# Patient Record
Sex: FEMALE | Race: WHITE | NOT HISPANIC OR LATINO | Employment: PART TIME | URBAN - METROPOLITAN AREA
[De-identification: names, ages, dates, MRNs, and addresses within clinical notes are randomized per-mention and may not be internally consistent; named-entity substitution may affect disease eponyms.]

---

## 2017-04-10 ENCOUNTER — APPOINTMENT (OUTPATIENT)
Dept: LAB | Facility: HOSPITAL | Age: 37
End: 2017-04-10

## 2017-04-10 ENCOUNTER — OFFICE VISIT (OUTPATIENT)
Dept: ONCOLOGY | Facility: CLINIC | Age: 37
End: 2017-04-10

## 2017-04-10 ENCOUNTER — LAB (OUTPATIENT)
Dept: OTHER | Facility: HOSPITAL | Age: 37
End: 2017-04-10

## 2017-04-10 VITALS
HEART RATE: 82 BPM | RESPIRATION RATE: 16 BRPM | BODY MASS INDEX: 22.62 KG/M2 | OXYGEN SATURATION: 100 % | TEMPERATURE: 98.2 F | DIASTOLIC BLOOD PRESSURE: 64 MMHG | HEIGHT: 59 IN | WEIGHT: 112.2 LBS | SYSTOLIC BLOOD PRESSURE: 102 MMHG

## 2017-04-10 DIAGNOSIS — Z15.01 BRCA1 GENETIC CARRIER: ICD-10-CM

## 2017-04-10 DIAGNOSIS — Z15.09 BRCA1 GENETIC CARRIER: ICD-10-CM

## 2017-04-10 DIAGNOSIS — C50.111 BILATERAL MALIGNANT NEOPLASM OF CENTRAL PORTION OF BREAST IN FEMALE (HCC): ICD-10-CM

## 2017-04-10 DIAGNOSIS — C50.112 BILATERAL MALIGNANT NEOPLASM OF CENTRAL PORTION OF BREAST IN FEMALE (HCC): Primary | ICD-10-CM

## 2017-04-10 DIAGNOSIS — Z79.811 LONG TERM CURRENT USE OF AROMATASE INHIBITOR: ICD-10-CM

## 2017-04-10 DIAGNOSIS — D64.9 ANEMIA, UNSPECIFIED TYPE: Primary | ICD-10-CM

## 2017-04-10 DIAGNOSIS — C50.111 BILATERAL MALIGNANT NEOPLASM OF CENTRAL PORTION OF BREAST IN FEMALE (HCC): Primary | ICD-10-CM

## 2017-04-10 DIAGNOSIS — C50.112 BILATERAL MALIGNANT NEOPLASM OF CENTRAL PORTION OF BREAST IN FEMALE (HCC): ICD-10-CM

## 2017-04-10 LAB
ALBUMIN SERPL-MCNC: 4.2 G/DL (ref 3.5–5.2)
ALBUMIN/GLOB SERPL: 1.6 G/DL
ALP SERPL-CCNC: 93 U/L (ref 39–117)
ALT SERPL W P-5'-P-CCNC: 30 U/L (ref 1–33)
ANION GAP SERPL CALCULATED.3IONS-SCNC: 11.5 MMOL/L
AST SERPL-CCNC: 39 U/L (ref 1–32)
BASOPHILS # BLD AUTO: 0.03 10*3/MM3 (ref 0–0.2)
BASOPHILS NFR BLD AUTO: 0.6 % (ref 0–1.5)
BILIRUB SERPL-MCNC: 0.3 MG/DL (ref 0.1–1.2)
BUN BLD-MCNC: 12 MG/DL (ref 6–20)
BUN/CREAT SERPL: 23.5 (ref 7–25)
CALCIUM SPEC-SCNC: 9 MG/DL (ref 8.6–10.5)
CHLORIDE SERPL-SCNC: 102 MMOL/L (ref 98–107)
CO2 SERPL-SCNC: 29.5 MMOL/L (ref 22–29)
CREAT BLD-MCNC: 0.51 MG/DL (ref 0.57–1)
DEPRECATED RDW RBC AUTO: 42.7 FL (ref 37–54)
EOSINOPHIL # BLD AUTO: 0.03 10*3/MM3 (ref 0–0.7)
EOSINOPHIL NFR BLD AUTO: 0.6 % (ref 0.3–6.2)
ERYTHROCYTE [DISTWIDTH] IN BLOOD BY AUTOMATED COUNT: 13 % (ref 11.7–13)
FERRITIN SERPL-MCNC: 21.1 NG/ML (ref 13–150)
GFR SERPL CREATININE-BSD FRML MDRD: 136 ML/MIN/1.73
GLOBULIN UR ELPH-MCNC: 2.6 GM/DL
GLUCOSE BLD-MCNC: 98 MG/DL (ref 65–99)
HCT VFR BLD AUTO: 41.2 % (ref 35.6–45.5)
HGB BLD-MCNC: 14.3 G/DL (ref 11.9–15.5)
HOLD SPECIMEN: NORMAL
IMM GRANULOCYTES # BLD: 0.01 10*3/MM3 (ref 0–0.03)
IMM GRANULOCYTES NFR BLD: 0.2 % (ref 0–0.5)
LYMPHOCYTES # BLD AUTO: 0.88 10*3/MM3 (ref 0.9–4.8)
LYMPHOCYTES NFR BLD AUTO: 17.3 % (ref 19.6–45.3)
MCH RBC QN AUTO: 31.7 PG (ref 26.9–32)
MCHC RBC AUTO-ENTMCNC: 34.7 G/DL (ref 32.4–36.3)
MCV RBC AUTO: 91.4 FL (ref 80.5–98.2)
MONOCYTES # BLD AUTO: 0.4 10*3/MM3 (ref 0.2–1.2)
MONOCYTES NFR BLD AUTO: 7.9 % (ref 5–12)
NEUTROPHILS # BLD AUTO: 3.74 10*3/MM3 (ref 1.9–8.1)
NEUTROPHILS NFR BLD AUTO: 73.4 % (ref 42.7–76)
NRBC BLD MANUAL-RTO: 0 /100 WBC (ref 0–0)
PLATELET # BLD AUTO: 267 10*3/MM3 (ref 140–500)
PMV BLD AUTO: 9 FL (ref 6–12)
POTASSIUM BLD-SCNC: 3.8 MMOL/L (ref 3.5–5.2)
PROT SERPL-MCNC: 6.8 G/DL (ref 6–8.5)
RBC # BLD AUTO: 4.51 10*6/MM3 (ref 3.9–5.2)
SODIUM BLD-SCNC: 143 MMOL/L (ref 136–145)
WBC NRBC COR # BLD: 5.09 10*3/MM3 (ref 4.5–10.7)

## 2017-04-10 PROCEDURE — 80053 COMPREHEN METABOLIC PANEL: CPT | Performed by: INTERNAL MEDICINE

## 2017-04-10 PROCEDURE — 99214 OFFICE O/P EST MOD 30 MIN: CPT | Performed by: INTERNAL MEDICINE

## 2017-04-10 PROCEDURE — 36415 COLL VENOUS BLD VENIPUNCTURE: CPT

## 2017-04-10 PROCEDURE — 82728 ASSAY OF FERRITIN: CPT | Performed by: INTERNAL MEDICINE

## 2017-04-10 PROCEDURE — 85025 COMPLETE CBC W/AUTO DIFF WBC: CPT | Performed by: INTERNAL MEDICINE

## 2017-04-10 NOTE — PROGRESS NOTES
Subjective      EASONS FOR FOLLOWUP:   1. L9T6uH3 triple negative right breast cancer.   2. T1Nx left breast cancer invasive ductal carcinoma, ER/SD positive, HER2 negative.   3. BRCA 1 positive.   4. Right internal mammary node positive on CT scan.   5. Neoadjuvant dose dense Adriamycin/Cytoxan, weekly Taxol started on 01/20/2014 with plan for axillary dissection on the right and sentinel node on left, bilateral mastectomies plus radiation to the right breast and internal mammary nodes.   6. Addition of carboplatin with weekly Taxol to start on 03/24/2014.   7. Carboplatin held on 05/05/2014 due to thrombocytopenia.   8. The patient was seen on 08/05/2014, postoperatively; bilateral mastectomies and sentinel nodes with a 5 mm residua l grade 3 tumor in the right breast with two negative sentinel nodes (cannot tell if the node with a clip was removed). Complete response in the left breast with two negative sentinel nodes. The decision was made to consider radiation because of her pret reatment internal mammary node on the right and tamoxifen therapy for the hormone positive breast cancer on right. Clinical trials are being looked into.   9. Additional surgery to remove the lymph node with the clip in the right axilla, 3 nodes removed and negative for metastatic disease. Tamoxifen started September 2014.   10. Radiation completed in October 2014. Tamoxifen on hold because of some neurological signs.   11. Not eligible for NSABP B-55 because of tamoxifen as of 10/20/2014.   12. Tamoxifen resumed on 11/18/2014 on every other day dose.   13. CT scans done for right neck discomfort with negative scans except for radiation scarring in the right lung apex.   14. Post total abdominal hysterectomy and BSO in 01/2015. Tamoxifen resumed with switch to an AI in 7/15  15. Lymphedema, right arm.                   16.  Iron deficiency anemia    History of Present Illness  patient is a 36-year-old white female with BRCA1  positivity and bilateral breast cancers currently on Arimidex since 2015.  Overall she is tolerating Arimidex well with very few hot flashes because of the Effexor but she is clearly not herself due to the postmenopausal state and has less energy low libido and I think some amount of depression which is not unusual in this situation.  Her mother had osteoporosis and her baseline bone density is osteopenia which is fairly severe and I added Boniva monthly in addition to calcium and vitamin D but she    .Her neck pain is resolved she is sleeping okay appetite is fair and apart from fatigue which is related to her postmenopausal state she appears to be doing okay.  She is a little more irritable than usual but realizes this is part of the postmenopausal state  Complains of some irritable bowel type symptoms with constipation alternating with diarrhea and she is a little more anemic than usual and her ferritin was low and she was given oral iron with good improvement in her hemoglobin.  I'm reluctant to add another test with colonoscopy for at this point and we will wait and see how she does off iron and if her hemoglobin drops and her iron is low again the colonoscopy and EGD will be scheduled .  Her lymphedema is a little better this summer    She continues to have discomfort over the right chest wall where she received radiation and does not feel that the implant is his flexible and stiffer and because of the radiation fibrosis on CAT scan has been concerned about issues here.    Her liver function tests have been barely up on and off and therefore we will do another ultrasound to evaluate the liver and gallbladder.    PAST MEDICAL HISTORY: Unremarkable. She has no chronic illnesses, on no chronic medication. She had no surgeries except for two C-sections, one in  and one in .     OB/GYN HISTORY: She is  2, para 2. Menarche at age 10. First childbirth wa s at age 26. She breast-fed her second  child for 6 weeks. She was on birth control until her first childbirth and then used a Mirena IUD which she just had removed 3 months ago.     HEMATOLOGIC/ONCOLOGIC HISTORY:   The patient was seen on 08/05/2014, having had bilateral mastectomies. Right breast had a 5 mm residual invasive tumor, grade 3, with clear margins, two negative sentinel nodes (I cannot tell if the lymph node with the clip was remove d and we will have to assess this). Left breast shows a complete pathological response with two negative sentinel nodes. The decision was made to review her for radiation therapy because of enlarged right internal mammary node, pretreatment, and to star t tamoxifen. She is going to have her ovaries removed later in the year when she has expanders put in and we can switch to an AI at that point. I am looking into clinical trials for BCRA positive patients with residual disease and for triple negative canc ers with residual disease but the 5 mm tumor may disqualify her.        The patient was seen on 09/02/2014 with additional surgery to remove the lymph node in her axilla with the clip in it which was thankfully benign in addition to 3 other nodes which were nega tive. Radiation to the internal mammary chain planned plus tamoxifen for 5 years. The NSABP B55 study is expected to have an amendment including hormone positive patients and we will revisit this issue after her radiation. Her port has been removed.        NSA BP amendment has not yet gone through to include patients on tamoxifen, therefore, she is not eligible for NSABP B-55 as of 10/21/2014. Tamoxifen held for 1 month because of some dizzy spells and hysterectomy and oophorectomy planned for early 2015, at wh ich point we will switch to Arimidex.        The patient was seen on 02/25/2014 having CT scans because a preop chest x-ray before hysterectomy showed a shadow in the right apex. CT scans showed what seemed to be radiation change in the apex of the  right lung. No abnormality in the neck or abdomen and a residual small seroma in the right axilla. Path reports on her hysterectomy and BSO were benign and because of her intolerance of tamoxifen, we started her on Effexor 12.5 b.i.d. to be increased as tolerated and t hen to resume tamoxifen every other day for a month and then go to daily if possible. Once she is stable at this, we will try and switch over to an AI if she can tolerate this.        The patient was seen on 04/21/2015 with a CT scan done to followup on changes noted on a previous CT scans, which were felt to be radiation-related. Thankfully, the scan shows diminished consolidation of the pleural surface and a small subpleural air space disease, stable at 8 x 4 mm and images through the upper abdomen and the re s t of the lung were negative. She is up to 37.5 of Effexor with good control of her hot flashes and taking her tamoxifen every other day and we have asked her to increase her tamoxifen to daily and we will see her back in 3 months and if she is tolerating this well, consider switching to an AI at that time.        Patient seen on 07/21/2015, tolerating tamoxifen well at full dose for the last 3 months since her hysterectomy. Plans to switch to Femara in 2-3 months. Nocturnal cough, which I suspect is allergic or reflux related. Consider a steroid inhaler.            SOCIAL HISTORY: She is , lives with her . She is school psychologist. She does not smoke. She drinks very minimally. No trouble with drug addiction.    FAMILY HISTORY: Parents are in their late 50s and are healthy. She has no siblings. She has a paternal aunt with breast cancer in 40s, maternal grandmother with breast cancer at age 58,   and both of the grandmother' s sisters had breast cancer in their 50s. She has a maternal uncle with prostate cancer at age 54. There is no ovarian, uterine or pancreatic cancer in the family that she is aware of. BRCA 1  "positive    Review of Systems   Constitutional: Positive for fatigue.   Respiratory: Positive for chest tightness.    Endocrine: Positive for heat intolerance.   Psychiatric/Behavioral: Positive for decreased concentration.   All other systems reviewed and are negative.     A comprehensive 14 point review of systems was performed and was negative except as mentioned.    Medications:  The current medication list was reviewed in the EMR    ALLERGIES:    Allergies   Allergen Reactions   • No Known Drug Allergy        Objective      Vitals:    04/10/17 1336   BP: 102/64   Pulse: 82   Resp: 16   Temp: 98.2 °F (36.8 °C)   TempSrc: Oral   SpO2: 100%   Weight: 112 lb 3.2 oz (50.9 kg)   Height: 59.45\" (151 cm)  Comment: new ht without shoes   PainSc: 0-No pain     Current Status 4/10/2017   ECOG score 0       Physical Exam    GENERAL:  Well-developed, well-nourished in no acute distress.   SKIN:  Warm, dry without rashes, purpura or petechiae.  HEAD:  Normocephalic.  EYES:  Pupils equal, round and reactive to light.  EOMs intact.  Conjunctivae normal.  EARS:  Hearing intact.  NOSE:  Septum midline.  No excoriations or nasal discharge.  MOUTH:  Tongue is well-papillated; no stomatitis or ulcers.  Lips normal.  THROAT:  Oropharynx without lesions or exudates.  NECK:  Supple with good range of motion; no thyromegaly or masses, no JVD.  LYMPHATICS:  No cervical, supraclavicular, axillary or inguinal adenopathy.  CHEST:  Lungs clear to percussion and auscultation. Good airflow.  BREASTS: Bilateral implants benign there is no axillary adenopathy  CARDIAC:  Regular rate and rhythm without murmurs, rubs or gallops. Normal S1,S2.  ABDOMEN:  Soft, nontender with no organomegaly or masses.  EXTREMITIES:  No clubbing, cyanosis or edema.  NEUROLOGICAL:  Cranial Nerves II-XII grossly intact.  No focal neurological deficits.  PSYCHIATRIC:  Normal affect and mood.        RECENT LABS:  Hematology WBC   Date Value Ref Range Status "   04/10/2017 5.09 4.50 - 10.70 10*3/mm3 Final     RBC   Date Value Ref Range Status   04/10/2017 4.51 3.90 - 5.20 10*6/mm3 Final     Hemoglobin   Date Value Ref Range Status   04/10/2017 14.3 11.9 - 15.5 g/dL Final     Hematocrit   Date Value Ref Range Status   04/10/2017 41.2 35.6 - 45.5 % Final     Platelets   Date Value Ref Range Status   04/10/2017 267 140 - 500 10*3/mm3 Final        Ferritin 21  Assessment/Plan   1.[id number] triple negative right breast cancer. [id number] dose dense Adriamycin Cytoxan and weekly carbotaxol-on tamoxifen-switch to Arimidex after oophorectomy in July 2015  2.T1Nx left breast cancer invasive ductal carcinoma, ER/NM positive, HER2 negative. [id number]                  3. BRCA 1 positive. Post hysterectomy and oophorectomy                   4.  Lymphedema right arm improved with a sleeve                  5.  Mild depression declines any treatment for this now                  6.  Mild anemia due to iron deficiency responding to oral iron                  7.  Osteopenia moderately severe-declined Boniva  was started now                  8.  Mildly elevated LFTs  Plan  Continue Arimidex and Effexor. no increase in Effexor dose at this time as she is managing fairly well with her depression.   Boniva monthly orally and calcium and vitamin D for her fairly significant osteopenia  Ultrasound liver and gallbladder because a minimally elevated LFTs  Decrease ferrous sulfate to 1 daily  Return in 4 months for follow-up with a ferritin level and if she is still in the 20s EGD and colonoscopy will be scheduled                        4/10/2017      CC:

## 2017-04-13 ENCOUNTER — HOSPITAL ENCOUNTER (OUTPATIENT)
Dept: ULTRASOUND IMAGING | Facility: HOSPITAL | Age: 37
Discharge: HOME | End: 2017-04-13
Attending: INTERNAL MEDICINE | Admitting: INTERNAL MEDICINE

## 2017-04-13 DIAGNOSIS — C50.112 BILATERAL MALIGNANT NEOPLASM OF CENTRAL PORTION OF BREAST IN FEMALE (HCC): ICD-10-CM

## 2017-04-13 DIAGNOSIS — Z79.811 LONG TERM CURRENT USE OF AROMATASE INHIBITOR: ICD-10-CM

## 2017-04-13 DIAGNOSIS — Z15.01 BRCA1 GENETIC CARRIER: ICD-10-CM

## 2017-04-13 DIAGNOSIS — C50.111 BILATERAL MALIGNANT NEOPLASM OF CENTRAL PORTION OF BREAST IN FEMALE (HCC): ICD-10-CM

## 2017-04-13 DIAGNOSIS — Z15.09 BRCA1 GENETIC CARRIER: ICD-10-CM

## 2017-04-13 PROCEDURE — 76705 ECHO EXAM OF ABDOMEN: CPT

## 2017-05-11 RX ORDER — ANASTROZOLE 1 MG/1
TABLET ORAL
Qty: 30 TABLET | Refills: 2 | Status: SHIPPED | OUTPATIENT
Start: 2017-05-11 | End: 2017-09-24 | Stop reason: SDUPTHER

## 2017-05-17 RX ORDER — VENLAFAXINE HYDROCHLORIDE 37.5 MG/1
CAPSULE, EXTENDED RELEASE ORAL
Qty: 30 CAPSULE | Refills: 4 | Status: SHIPPED | OUTPATIENT
Start: 2017-05-17 | End: 2017-10-15 | Stop reason: SDUPTHER

## 2017-07-24 ENCOUNTER — OFFICE VISIT (OUTPATIENT)
Dept: ONCOLOGY | Facility: CLINIC | Age: 37
End: 2017-07-24

## 2017-07-24 ENCOUNTER — LAB (OUTPATIENT)
Dept: OTHER | Facility: HOSPITAL | Age: 37
End: 2017-07-24

## 2017-07-24 VITALS
HEART RATE: 88 BPM | TEMPERATURE: 98.5 F | WEIGHT: 112 LBS | RESPIRATION RATE: 12 BRPM | DIASTOLIC BLOOD PRESSURE: 62 MMHG | BODY MASS INDEX: 22.58 KG/M2 | OXYGEN SATURATION: 99 % | HEIGHT: 59 IN | SYSTOLIC BLOOD PRESSURE: 90 MMHG

## 2017-07-24 DIAGNOSIS — C50.112 BILATERAL MALIGNANT NEOPLASM OF CENTRAL PORTION OF BREAST IN FEMALE (HCC): ICD-10-CM

## 2017-07-24 DIAGNOSIS — Z15.09 BRCA1 GENETIC CARRIER: ICD-10-CM

## 2017-07-24 DIAGNOSIS — Z79.811 LONG TERM CURRENT USE OF AROMATASE INHIBITOR: ICD-10-CM

## 2017-07-24 DIAGNOSIS — C50.111 BILATERAL MALIGNANT NEOPLASM OF CENTRAL PORTION OF BREAST IN FEMALE (HCC): ICD-10-CM

## 2017-07-24 DIAGNOSIS — Z79.811 LONG TERM CURRENT USE OF AROMATASE INHIBITOR: Primary | ICD-10-CM

## 2017-07-24 DIAGNOSIS — Z15.01 BRCA1 GENETIC CARRIER: ICD-10-CM

## 2017-07-24 LAB
ALBUMIN SERPL-MCNC: 3.5 G/DL (ref 3.5–5.2)
ALBUMIN/GLOB SERPL: 1.5 G/DL
ALP SERPL-CCNC: 91 U/L (ref 39–117)
ALT SERPL W P-5'-P-CCNC: 32 U/L (ref 1–33)
ANION GAP SERPL CALCULATED.3IONS-SCNC: 10.9 MMOL/L
AST SERPL-CCNC: 41 U/L (ref 1–32)
BASOPHILS # BLD AUTO: 0.02 10*3/MM3 (ref 0–0.2)
BASOPHILS NFR BLD AUTO: 0.3 % (ref 0–1.5)
BILIRUB SERPL-MCNC: 0.2 MG/DL (ref 0.1–1.2)
BUN BLD-MCNC: 8 MG/DL (ref 6–20)
BUN/CREAT SERPL: 15.7 (ref 7–25)
CALCIUM SPEC-SCNC: 8.3 MG/DL (ref 8.6–10.5)
CHLORIDE SERPL-SCNC: 104 MMOL/L (ref 98–107)
CO2 SERPL-SCNC: 26.1 MMOL/L (ref 22–29)
CREAT BLD-MCNC: 0.51 MG/DL (ref 0.57–1)
DEPRECATED RDW RBC AUTO: 43.8 FL (ref 37–54)
EOSINOPHIL # BLD AUTO: 0.04 10*3/MM3 (ref 0–0.7)
EOSINOPHIL NFR BLD AUTO: 0.7 % (ref 0.3–6.2)
ERYTHROCYTE [DISTWIDTH] IN BLOOD BY AUTOMATED COUNT: 13.1 % (ref 11.7–13)
FERRITIN SERPL-MCNC: 25.7 NG/ML (ref 13–150)
GFR SERPL CREATININE-BSD FRML MDRD: 136 ML/MIN/1.73
GLOBULIN UR ELPH-MCNC: 2.4 GM/DL
GLUCOSE BLD-MCNC: 80 MG/DL (ref 65–99)
HCT VFR BLD AUTO: 42.7 % (ref 35.6–45.5)
HGB BLD-MCNC: 14.5 G/DL (ref 11.9–15.5)
IMM GRANULOCYTES # BLD: 0.02 10*3/MM3 (ref 0–0.03)
IMM GRANULOCYTES NFR BLD: 0.3 % (ref 0–0.5)
IRON 24H UR-MRATE: 57 MCG/DL (ref 37–145)
IRON SATN MFR SERPL: 17 % (ref 20–50)
LYMPHOCYTES # BLD AUTO: 0.93 10*3/MM3 (ref 0.9–4.8)
LYMPHOCYTES NFR BLD AUTO: 15.9 % (ref 19.6–45.3)
MCH RBC QN AUTO: 31.4 PG (ref 26.9–32)
MCHC RBC AUTO-ENTMCNC: 34 G/DL (ref 32.4–36.3)
MCV RBC AUTO: 92.4 FL (ref 80.5–98.2)
MONOCYTES # BLD AUTO: 0.62 10*3/MM3 (ref 0.2–1.2)
MONOCYTES NFR BLD AUTO: 10.6 % (ref 5–12)
NEUTROPHILS # BLD AUTO: 4.23 10*3/MM3 (ref 1.9–8.1)
NEUTROPHILS NFR BLD AUTO: 72.2 % (ref 42.7–76)
NRBC BLD MANUAL-RTO: 0 /100 WBC (ref 0–0)
PLATELET # BLD AUTO: 286 10*3/MM3 (ref 140–500)
PMV BLD AUTO: 9.1 FL (ref 6–12)
POTASSIUM BLD-SCNC: 4.1 MMOL/L (ref 3.5–5.2)
PROT SERPL-MCNC: 5.9 G/DL (ref 6–8.5)
RBC # BLD AUTO: 4.62 10*6/MM3 (ref 3.9–5.2)
SODIUM BLD-SCNC: 141 MMOL/L (ref 136–145)
TIBC SERPL-MCNC: 343 MCG/DL (ref 298–536)
TRANSFERRIN SERPL-MCNC: 230 MG/DL (ref 200–360)
WBC NRBC COR # BLD: 5.86 10*3/MM3 (ref 4.5–10.7)

## 2017-07-24 PROCEDURE — 83540 ASSAY OF IRON: CPT | Performed by: INTERNAL MEDICINE

## 2017-07-24 PROCEDURE — 85025 COMPLETE CBC W/AUTO DIFF WBC: CPT | Performed by: INTERNAL MEDICINE

## 2017-07-24 PROCEDURE — 80053 COMPREHEN METABOLIC PANEL: CPT | Performed by: INTERNAL MEDICINE

## 2017-07-24 PROCEDURE — 99214 OFFICE O/P EST MOD 30 MIN: CPT | Performed by: INTERNAL MEDICINE

## 2017-07-24 PROCEDURE — 84466 ASSAY OF TRANSFERRIN: CPT | Performed by: INTERNAL MEDICINE

## 2017-07-24 PROCEDURE — 82728 ASSAY OF FERRITIN: CPT | Performed by: INTERNAL MEDICINE

## 2017-07-24 PROCEDURE — 36415 COLL VENOUS BLD VENIPUNCTURE: CPT

## 2017-07-24 NOTE — PROGRESS NOTES
Subjective      EASONS FOR FOLLOWUP:   1. [id number] triple negative right breast cancer.   2. T1Nx left breast cancer invasive ductal carcinoma, ER/ND positive, HER2 negative.   3. BRCA 1 positive.   4. Right internal mammary node positive on CT scan.   5. Neoadjuvant dose dense Adriamycin/Cytoxan, weekly Taxol started on 01/20/2014 with plan for axillary dissection on the right and sentinel node on left, bilateral mastectomies plus radiation to the right breast and internal mammary nodes.   6. Addition of carboplatin with weekly Taxol to start on 03/24/2014.   7. Carboplatin held on 05/05/2014 due to thrombocytopenia.   8. The patient was seen on 08/05/2014, postoperatively; bilateral mastectomies and sentinel nodes with a 5 mm residua l grade 3 tumor in the right breast with two negative sentinel nodes (cannot tell if the node with a clip was removed). Complete response in the left breast with two negative sentinel nodes. The decision was made to consider radiation because of her pret reatment internal mammary node on the right and tamoxifen therapy for the hormone positive breast cancer on right. Clinical trials are being looked into.   9. Additional surgery to remove the lymph node with the clip in the right axilla, 3 nodes removed and negative for metastatic disease. Tamoxifen started September 2014.   10. Radiation completed in October 2014. Tamoxifen on hold because of some neurological signs.   11. Not eligible for NSABP B-55 because of tamoxifen as of 10/20/2014.   12. Tamoxifen resumed on 11/18/2014 on every other day dose.   13. CT scans done for right neck discomfort with negative scans except for radiation scarring in the right lung apex.   14. Post total abdominal hysterectomy and BSO in 01/2015. Tamoxifen resumed with switch to an AI in 7/15  15. Lymphedema, right arm.                   16.  Iron deficiency anemia    History of Present Illness  patient is a 36-year-old white female with BRCA1  positivity and bilateral breast cancers currently on Arimidex for the last 2 years.  Comes in for follow-up and overall she is doing fairly.  She remains anxious and depressed because of her menopausal state and mood swings and fatigue.  She is not sleeping well BUTstates she is tired in the morning.  She feels that she is dependent on the Effexor and when he gets close to the 24-hour time, She feels slightly dysphoric until she takes her Effexor.  I suggested a visit to Dr. Suki Wild's clinic to see if she has any better suggestions for an antianxiety pill and mood elevated which also helps with hot flashes.  She continues to have irritable bowel symptoms and now diarrhea is more problematic.  Her ferritin is slowly creeping up on 1 iron pill a day and we will hold off on EGD and colonoscopy for now but if her symptoms don't improve a GI consult would be indicated    Thankfully the liver ultrasound was negative and the gallbladder was also normal as was her pancreas    PAST MEDICAL HISTORY: Unremarkable. She has no chronic illnesses, on no chronic medication. She had no surgeries except for two C-sections, one in  and one in .     OB/GYN HISTORY: She is  2, para 2. Menarche at age 10. First childbirth wa s at age 26. She breast-fed her second child for 6 weeks. She was on birth control until her first childbirth and then used a Mirena IUD which she just had removed 3 months ago.     HEMATOLOGIC/ONCOLOGIC HISTORY:   The patient was seen on 2014, having had bilateral mastectomies. Right breast had a 5 mm residual invasive tumor, grade 3, with clear margins, two negative sentinel nodes (I cannot tell if the lymph node with the clip was remove d and we will have to assess this). Left breast shows a complete pathological response with two negative sentinel nodes. The decision was made to review her for radiation therapy because of enlarged right internal mammary node, pretreatment, and to  star t tamoxifen. She is going to have her ovaries removed later in the year when she has expanders put in and we can switch to an AI at that point. I am looking into clinical trials for BCRA positive patients with residual disease and for triple negative canc ers with residual disease but the 5 mm tumor may disqualify her.        The patient was seen on 09/02/2014 with additional surgery to remove the lymph node in her axilla with the clip in it which was thankfully benign in addition to 3 other nodes which were nega tive. Radiation to the internal mammary chain planned plus tamoxifen for 5 years. The NSABP B55 study is expected to have an amendment including hormone positive patients and we will revisit this issue after her radiation. Her port has been removed.        NSA BP amendment has not yet gone through to include patients on tamoxifen, therefore, she is not eligible for NSABP B-55 as of 10/21/2014. Tamoxifen held for 1 month because of some dizzy spells and hysterectomy and oophorectomy planned for early 2015, at wh ich point we will switch to Arimidex.        The patient was seen on 02/25/2014 having CT scans because a preop chest x-ray before hysterectomy showed a shadow in the right apex. CT scans showed what seemed to be radiation change in the apex of the right lung. No abnormality in the neck or abdomen and a residual small seroma in the right axilla. Path reports on her hysterectomy and BSO were benign and because of her intolerance of tamoxifen, we started her on Effexor 12.5 b.i.d. to be increased as tolerated and t hen to resume tamoxifen every other day for a month and then go to daily if possible. Once she is stable at this, we will try and switch over to an AI if she can tolerate this.        The patient was seen on 04/21/2015 with a CT scan done to followup on changes noted on a previous CT scans, which were felt to be radiation-related. Thankfully, the scan shows diminished consolidation of  the pleural surface and a small subpleural air space disease, stable at 8 x 4 mm and images through the upper abdomen and the re s t of the lung were negative. She is up to 37.5 of Effexor with good control of her hot flashes and taking her tamoxifen every other day and we have asked her to increase her tamoxifen to daily and we will see her back in 3 months and if she is tolerating this well, consider switching to an AI at that time.        Patient seen on 07/21/2015, tolerating tamoxifen well at full dose for the last 3 months since her hysterectomy. Plans to switch to Femara in 2-3 months. Nocturnal cough, which I suspect is allergic or reflux related. Consider a steroid inhaler.        7/17 Arimidex since July 2015.  Overall she is tolerating Arimidex well with very few hot flashes because of the Effexor but she is clearly not herself due to the postmenopausal state and has less energy low libido and I think some amount of depression which is not unusual in this situation.  Her mother had osteoporosis and her baseline bone density is osteopenia which is fairly severe and I added Boniva monthly in addition to calcium and vitamin D    .Her neck pain is resolved she is sleeping okay appetite is fair and apart from fatigue which is related to her postmenopausal state she appears to be doing okay.  She is a little more irritable than usual but realizes this is part of the postmenopausal state  Complains of some irritable bowel type symptoms with constipation alternating with diarrhea and she is a little more anemic than usual and her ferritin was low and she was given oral iron with good improvement in her hemoglobin.  I'm reluctant to add another test with colonoscopy for at this point and we will wait and see how she does off iron and if her hemoglobin drops and her iron is low again the colonoscopy and EGD will be scheduled .  Her lymphedema is a little better this summer    She continues to have discomfort over  "the right chest wall where she received radiation and does not feel that the implant is his flexible and stiffer and because of the radiation fibrosis on CAT scan has been concerned about issues here.    Her liver function tests have been barely up on and off and therefore we will do another ultrasound to evaluate the liver and gallbladder.        SOCIAL HISTORY: She is , lives with her . She is school psychologist. She does not smoke. She drinks very minimally. No trouble with drug addiction.    FAMILY HISTORY: Parents are in their late 50s and are healthy. She has no siblings. She has a paternal aunt with breast cancer in 40s, maternal grandmother with breast cancer at age 58,   and both of the grandmother' s sisters had breast cancer in their 50s. She has a maternal uncle with prostate cancer at age 54. There is no ovarian, uterine or pancreatic cancer in the family that she is aware of. BRCA 1 positive    Review of Systems   Constitutional: Positive for fatigue.   Gastrointestinal: Positive for diarrhea.   Endocrine: Positive for heat intolerance.   Psychiatric/Behavioral: Positive for decreased concentration, dysphoric mood and sleep disturbance. The patient is nervous/anxious.    All other systems reviewed and are negative.     A comprehensive 14 point review of systems was performed and was negative except as mentioned.    Medications:  The current medication list was reviewed in the EMR    ALLERGIES:    Allergies   Allergen Reactions   • No Known Drug Allergy        Objective      Vitals:    07/24/17 1531   BP: 90/62   Pulse: 88   Resp: 12   Temp: 98.5 °F (36.9 °C)   TempSrc: Oral   SpO2: 99%   Weight: 112 lb (50.8 kg)   Height: 59.45\" (151 cm)   PainSc: 0-No pain     Current Status 7/24/2017   ECOG score 0       Physical Exam    GENERAL:  Well-developed, well-nourished in no acute distress.   SKIN:  Warm, dry without rashes, purpura or petechiae.  HEAD:  Normocephalic.  EYES:  Pupils equal, " round and reactive to light.  EOMs intact.  Conjunctivae normal.  EARS:  Hearing intact.  NOSE:  Septum midline.  No excoriations or nasal discharge.  MOUTH:  Tongue is well-papillated; no stomatitis or ulcers.  Lips normal.  THROAT:  Oropharynx without lesions or exudates.  NECK:  Supple with good range of motion; no thyromegaly or masses, no JVD.  LYMPHATICS:  No cervical, supraclavicular, axillary or inguinal adenopathy.  CHEST:  Lungs clear to percussion and auscultation. Good airflow.  BREASTS: Bilateral implants benign there is no axillary adenopathy  CARDIAC:  Regular rate and rhythm without murmurs, rubs or gallops. Normal S1,S2.  ABDOMEN:  Soft, nontender with no organomegaly or masses.  EXTREMITIES:  No clubbing, cyanosis or edema.  NEUROLOGICAL:  Cranial Nerves II-XII grossly intact.  No focal neurological deficits.  PSYCHIATRIC:  Normal affect and mood.        RECENT LABS:  Hematology WBC   Date Value Ref Range Status   07/24/2017 5.86 4.50 - 10.70 10*3/mm3 Final     RBC   Date Value Ref Range Status   07/24/2017 4.62 3.90 - 5.20 10*6/mm3 Final     Hemoglobin   Date Value Ref Range Status   07/24/2017 14.5 11.9 - 15.5 g/dL Final     Hematocrit   Date Value Ref Range Status   07/24/2017 42.7 35.6 - 45.5 % Final     Platelets   Date Value Ref Range Status   07/24/2017 286 140 - 500 10*3/mm3 Final      FINDINGS: Sonographic evaluation of the liver and selective structures  of the right upper quadrant was performed. The right kidney has a normal  appearance. No abnormality of the liver is appreciated. The liver  measures on the order of 17.5 cm in anterior to posterior dimension. The  gallbladder has a normal appearance. The common bile duct measures 0.4  cm in diameter. Per the sonographer, the patient was nontender in the  right upper quadrant during the examination. Visualized portion of the  pancreas appears normal.      IMPRESSION:  Negative liver sonogram.      This report was finalized on 4/14/2017    Ferritin 25    Assessment/Plan   1.[id number] triple negative right breast cancer. [id number] dose dense Adriamycin Cytoxan and weekly carbotaxol-on tamoxifen-switch to Arimidex after oophorectomy in July 2015  2.T1Nx left breast cancer invasive ductal carcinoma, ER/[place] positive, HER2 negative. [id number]                  3. BRCA 1 positive. Post hysterectomy and oophorectomy                   4.  Lymphedema right arm improved with a sleeve                  5.  Mild depression declines any treatment for this now                  6.  Mild anemia due to iron deficiency responding to oral iron                  7.  Osteopenia moderately severe-declined Boniva  was started                   8.  Mildly elevated LFTs with negative liver ultrasound the pancreas      Plan  Continue Arimidex and Effexor and Boniva. no increase in Effexor dose at this time as she is managing fairly well with her depression.  I suggested a consult with Dr. Suki Wild regarding alternative medication   Boniva monthly orally and calcium and vitamin D for her fairly significant osteopenia  Decreased ferrous sulfate to 1 every other day  Return in 4 months for follow-up and she is nervous to make the interval any longer-if her GI complaints persist we will send her to a GI doctor at that point                         7/24/2017      CC:

## 2017-07-26 ENCOUNTER — TELEPHONE (OUTPATIENT)
Dept: PSYCHIATRY | Facility: HOSPITAL | Age: 37
End: 2017-07-26

## 2017-07-26 NOTE — TELEPHONE ENCOUNTER
Pt called regarding referral to [facility]. Message left asking pt to return call to establish new pt evaluation.

## 2017-07-27 ENCOUNTER — TELEPHONE (OUTPATIENT)
Dept: PSYCHIATRY | Facility: HOSPITAL | Age: 37
End: 2017-07-27

## 2017-08-07 ENCOUNTER — TELEPHONE (OUTPATIENT)
Dept: PSYCHIATRY | Facility: HOSPITAL | Age: 37
End: 2017-08-07

## 2017-08-07 NOTE — TELEPHONE ENCOUNTER
[facility]    Third and final call placed to pt regarding establishing new pt evaluation in [facility]. Message left for pt to return call. [place] noting, she did return prior call and left message with Seema who has also been trying to reach pt. We would be very happy to set up new evaluation time with either myself or Dr. Wild within next two weeks, if patient interested.

## 2017-09-25 RX ORDER — ANASTROZOLE 1 MG/1
TABLET ORAL
Qty: 30 TABLET | Refills: 2 | Status: SHIPPED | OUTPATIENT
Start: 2017-09-25 | End: 2018-01-28 | Stop reason: SDUPTHER

## 2017-09-25 NOTE — TELEPHONE ENCOUNTER
Anastrozole refill request rec from [facility] pharmacy through [facility]. Per 7/24/17 office note from Dr Moreno-pt will continue. Request approved.

## 2017-10-16 RX ORDER — VENLAFAXINE HYDROCHLORIDE 37.5 MG/1
CAPSULE, EXTENDED RELEASE ORAL
Qty: 30 CAPSULE | Refills: 3 | Status: SHIPPED | OUTPATIENT
Start: 2017-10-16 | End: 2018-02-14 | Stop reason: SDUPTHER

## 2017-10-27 ENCOUNTER — DOCUMENTATION (OUTPATIENT)
Dept: PHYSICAL THERAPY | Facility: HOSPITAL | Age: 37
End: 2017-10-27

## 2017-10-27 DIAGNOSIS — I97.2 POSTMASTECTOMY LYMPHEDEMA SYNDROME: Primary | ICD-10-CM

## 2017-10-27 NOTE — THERAPY DISCHARGE NOTE
Outpatient Physical Therapy Discharge Summary         Patient Name: Sravani Sky  : 1980  MRN: [id number]    Today's Date: 10/27/2017    Visit Dx:    ICD-10-CM ICD-9-CM   1. 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   -SC     LTG 1 Progress Met  -SC     LTG 2 Patient score </=3/5 on DASH #30 for improved function and transition to self-management of condition.   -SC     LTG 2 Progress Met  -SC     LTG 3 Patient independent and compliant with new RTW prevention compression sleeve for travel.   -SC     LTG 3 Progress Met  -SC       User Key  (r) = Recorded By, (t) = Taken By, (c) = Cosigned By    Initials Name Provider Type    BETY Griffin, PT Physical Therapist          OP PT Discharge Summary  Date of Discharge: 10/27/17  Reason for Discharge: All goals achieved  Outcomes Achieved: Able to achieve all goals within established timeline  Discharge Destination: Home with home program  Discharge Instructions: to contact PT with any changes in symptoms, questions or concerns.      Time Calculation:                    Sonja Wililam PT  10/27/2017

## 2017-11-13 ENCOUNTER — OFFICE VISIT (OUTPATIENT)
Dept: ONCOLOGY | Facility: CLINIC | Age: 37
End: 2017-11-13

## 2017-11-13 ENCOUNTER — LAB (OUTPATIENT)
Dept: OTHER | Facility: HOSPITAL | Age: 37
End: 2017-11-13

## 2017-11-13 VITALS
DIASTOLIC BLOOD PRESSURE: 68 MMHG | HEART RATE: 86 BPM | BODY MASS INDEX: 23.18 KG/M2 | HEIGHT: 59 IN | TEMPERATURE: 98.8 F | RESPIRATION RATE: 12 BRPM | SYSTOLIC BLOOD PRESSURE: 105 MMHG | OXYGEN SATURATION: 100 % | WEIGHT: 115 LBS

## 2017-11-13 DIAGNOSIS — C50.112 BILATERAL MALIGNANT NEOPLASM OF CENTRAL PORTION OF BREAST IN FEMALE (HCC): ICD-10-CM

## 2017-11-13 DIAGNOSIS — Z17.0 MALIGNANT NEOPLASM OF CENTRAL PORTION OF BOTH BREASTS IN FEMALE, ESTROGEN RECEPTOR POSITIVE (HCC): Primary | ICD-10-CM

## 2017-11-13 DIAGNOSIS — D64.9 ANEMIA, UNSPECIFIED TYPE: ICD-10-CM

## 2017-11-13 DIAGNOSIS — Z15.01 BRCA1 GENETIC CARRIER: ICD-10-CM

## 2017-11-13 DIAGNOSIS — M85.869 OSTEOPENIA OF LOWER LEG, UNSPECIFIED LATERALITY: ICD-10-CM

## 2017-11-13 DIAGNOSIS — C50.111 MALIGNANT NEOPLASM OF CENTRAL PORTION OF BOTH BREASTS IN FEMALE, ESTROGEN RECEPTOR POSITIVE (HCC): Primary | ICD-10-CM

## 2017-11-13 DIAGNOSIS — C50.111 BILATERAL MALIGNANT NEOPLASM OF CENTRAL PORTION OF BREAST IN FEMALE (HCC): ICD-10-CM

## 2017-11-13 DIAGNOSIS — C50.112 MALIGNANT NEOPLASM OF CENTRAL PORTION OF BOTH BREASTS IN FEMALE, ESTROGEN RECEPTOR POSITIVE (HCC): Primary | ICD-10-CM

## 2017-11-13 DIAGNOSIS — Z15.09 BRCA1 GENETIC CARRIER: ICD-10-CM

## 2017-11-13 DIAGNOSIS — Z79.811 LONG TERM CURRENT USE OF AROMATASE INHIBITOR: ICD-10-CM

## 2017-11-13 LAB
ALBUMIN SERPL-MCNC: 4 G/DL (ref 3.5–5.2)
ALBUMIN/GLOB SERPL: 1.5 G/DL
ALP SERPL-CCNC: 111 U/L (ref 39–117)
ALT SERPL W P-5'-P-CCNC: 30 U/L (ref 1–33)
ANION GAP SERPL CALCULATED.3IONS-SCNC: 10.3 MMOL/L
AST SERPL-CCNC: 37 U/L (ref 1–32)
BASOPHILS # BLD AUTO: 0.02 10*3/MM3 (ref 0–0.2)
BASOPHILS NFR BLD AUTO: 0.4 % (ref 0–1.5)
BILIRUB SERPL-MCNC: 0.4 MG/DL (ref 0.1–1.2)
BUN BLD-MCNC: 11 MG/DL (ref 6–20)
BUN/CREAT SERPL: 23.9 (ref 7–25)
CALCIUM SPEC-SCNC: 9.1 MG/DL (ref 8.6–10.5)
CHLORIDE SERPL-SCNC: 103 MMOL/L (ref 98–107)
CO2 SERPL-SCNC: 28.7 MMOL/L (ref 22–29)
CREAT BLD-MCNC: 0.46 MG/DL (ref 0.57–1)
DEPRECATED RDW RBC AUTO: 39.8 FL (ref 37–54)
EOSINOPHIL # BLD AUTO: 0.05 10*3/MM3 (ref 0–0.7)
EOSINOPHIL NFR BLD AUTO: 1 % (ref 0.3–6.2)
ERYTHROCYTE [DISTWIDTH] IN BLOOD BY AUTOMATED COUNT: 12 % (ref 11.7–13)
GFR SERPL CREATININE-BSD FRML MDRD: >150 ML/MIN/1.73
GLOBULIN UR ELPH-MCNC: 2.6 GM/DL
GLUCOSE BLD-MCNC: 98 MG/DL (ref 65–99)
HCT VFR BLD AUTO: 39.6 % (ref 35.6–45.5)
HGB BLD-MCNC: 13.7 G/DL (ref 11.9–15.5)
IMM GRANULOCYTES # BLD: 0.02 10*3/MM3 (ref 0–0.03)
IMM GRANULOCYTES NFR BLD: 0.4 % (ref 0–0.5)
LYMPHOCYTES # BLD AUTO: 1.12 10*3/MM3 (ref 0.9–4.8)
LYMPHOCYTES NFR BLD AUTO: 22.8 % (ref 19.6–45.3)
MCH RBC QN AUTO: 31.4 PG (ref 26.9–32)
MCHC RBC AUTO-ENTMCNC: 34.6 G/DL (ref 32.4–36.3)
MCV RBC AUTO: 90.6 FL (ref 80.5–98.2)
MONOCYTES # BLD AUTO: 0.43 10*3/MM3 (ref 0.2–1.2)
MONOCYTES NFR BLD AUTO: 8.7 % (ref 5–12)
NEUTROPHILS # BLD AUTO: 3.28 10*3/MM3 (ref 1.9–8.1)
NEUTROPHILS NFR BLD AUTO: 66.7 % (ref 42.7–76)
NRBC BLD MANUAL-RTO: 0 /100 WBC (ref 0–0)
PLATELET # BLD AUTO: 266 10*3/MM3 (ref 140–500)
PMV BLD AUTO: 9.1 FL (ref 6–12)
POTASSIUM BLD-SCNC: 4.2 MMOL/L (ref 3.5–5.2)
PROT SERPL-MCNC: 6.6 G/DL (ref 6–8.5)
RBC # BLD AUTO: 4.37 10*6/MM3 (ref 3.9–5.2)
SODIUM BLD-SCNC: 142 MMOL/L (ref 136–145)
WBC NRBC COR # BLD: 4.92 10*3/MM3 (ref 4.5–10.7)

## 2017-11-13 PROCEDURE — 80053 COMPREHEN METABOLIC PANEL: CPT | Performed by: INTERNAL MEDICINE

## 2017-11-13 PROCEDURE — 36415 COLL VENOUS BLD VENIPUNCTURE: CPT

## 2017-11-13 PROCEDURE — 85025 COMPLETE CBC W/AUTO DIFF WBC: CPT | Performed by: INTERNAL MEDICINE

## 2017-11-13 PROCEDURE — 99214 OFFICE O/P EST MOD 30 MIN: CPT | Performed by: INTERNAL MEDICINE

## 2017-11-13 NOTE — PROGRESS NOTES
Subjective      EASONS FOR FOLLOWUP:   1. [id number] triple negative right breast cancer.   2. T1Nx left breast cancer invasive ductal carcinoma, ER/MS positive, HER2 negative.   3. BRCA 1 positive.   4. Right internal mammary node positive on CT scan.   5. Neoadjuvant dose dense Adriamycin/Cytoxan, weekly Taxol started on 01/20/2014 with plan for axillary dissection on the right and sentinel node on left, bilateral mastectomies plus radiation to the right breast and internal mammary nodes.   6. Addition of carboplatin with weekly Taxol to start on 03/24/2014.   7. Carboplatin held on 05/05/2014 due to thrombocytopenia.   8. The patient was seen on 08/05/2014, postoperatively; bilateral mastectomies and sentinel nodes with a 5 mm residua l grade 3 tumor in the right breast with two negative sentinel nodes (cannot tell if the node with a clip was removed). Complete response in the left breast with two negative sentinel nodes. The decision was made to consider radiation because of her pret reatment internal mammary node on the right and tamoxifen therapy for the hormone positive breast cancer on right. Clinical trials are being looked into.   9. Additional surgery to remove the lymph node with the clip in the right axilla, 3 nodes removed and negative for metastatic disease. Tamoxifen started September 2014.   10. Radiation completed in October 2014. Tamoxifen on hold because of some neurological signs.   11. Not eligible for NSABP B-55 because of tamoxifen as of 10/20/2014.   12. Tamoxifen resumed on 11/18/2014 on every other day dose.   13. CT scans done for right neck discomfort with negative scans except for radiation scarring in the right lung apex.   14. Post total abdominal hysterectomy and BSO in 01/2015. Tamoxifen resumed with switch to an AI in 7/15  15. Lymphedema, right arm.                   16.  Iron deficiency anemia    History of Present Illness  patient is a 36-year-old white female with BRCA1  positivity and bilateral breast cancers currently on Arimidex for the last 2 years after 1 year of tamoxifen.  Comes in for follow-up and overall she is doing fairly.  She remains anxious but her depression is somewhat better and she did not make the appointment to see the psychiatrist.  She is not sleeping well .  She feels that she is dependent on the Effexor and when he gets close to the 24-hour time, She feels slightly dysphoric until she takes her Effexor.   She continues to have irritable bowel symptoms and now constipation is more problematic.  Her ferritin is slowly creeping up and just stopped taking her iron pills so this is not the cause of the constipation   She has noticed some tenderness in the rib cage below the right implant and is worried about this.  She also has tightness and fullness in the right upper abdomen    Active Ambulatory Problems     Diagnosis Date Noted   • Malignant neoplasm of female breast 04/15/2016   • BRCA1 genetic carrier 04/15/2016   • Anemia 2016   • Long term current use of aromatase inhibitor 2016   • Osteopenia 2016     Resolved Ambulatory Problems     Diagnosis Date Noted   • No Resolved Ambulatory Problems     Past Medical History:   Diagnosis Date   • Cancer    • History of radiation therapy    • Lymphedema      Past Surgical History:   Procedure Laterality Date   • BREAST SURGERY Bilateral 2014    Mastectomy   •  SECTION  , 2010    X2   • HYSTERECTOMY  2015    Total         OB/GYN HISTORY: She is  2, para 2. Menarche at age 10. First childbirth wa s at age 26. She breast-fed her second child for 6 weeks. She was on birth control until her first childbirth and then used a Mirena IUD which she just had removed 3 months ago.     HEMATOLOGIC/ONCOLOGIC HISTORY:   The patient was seen on 2014, having had bilateral mastectomies. Right breast had a 5 mm residual invasive tumor, grade 3, with clear margins, two negative sentinel nodes  (I cannot tell if the lymph node with the clip was remove d and we will have to assess this). Left breast shows a complete pathological response with two negative sentinel nodes. The decision was made to review her for radiation therapy because of enlarged right internal mammary node, pretreatment, and to star t tamoxifen. She is going to have her ovaries removed later in the year when she has expanders put in and we can switch to an AI at that point. I am looking into clinical trials for BCRA positive patients with residual disease and for triple negative canc ers with residual disease but the 5 mm tumor may disqualify her.        The patient was seen on 09/02/2014 with additional surgery to remove the lymph node in her axilla with the clip in it which was thankfully benign in addition to 3 other nodes which were nega tive. Radiation to the internal mammary chain planned plus tamoxifen for 5 years. The NSABP B55 study is expected to have an amendment including hormone positive patients and we will revisit this issue after her radiation. Her port has been removed.        NSA BP amendment has not yet gone through to include patients on tamoxifen, therefore, she is not eligible for NSABP B-55 as of 10/21/2014. Tamoxifen held for 1 month because of some dizzy spells and hysterectomy and oophorectomy planned for early 2015, at wh ich point we will switch to Arimidex.        The patient was seen on 02/25/2014 having CT scans because a preop chest x-ray before hysterectomy showed a shadow in the right apex. CT scans showed what seemed to be radiation change in the apex of the right lung. No abnormality in the neck or abdomen and a residual small seroma in the right axilla. Path reports on her hysterectomy and BSO were benign and because of her intolerance of tamoxifen, we started her on Effexor 12.5 b.i.d. to be increased as tolerated and t hen to resume tamoxifen every other day for a month and then go to daily if  possible. Once she is stable at this, we will try and switch over to an AI if she can tolerate this.        The patient was seen on 04/21/2015 with a CT scan done to followup on changes noted on a previous CT scans, which were felt to be radiation-related. Thankfully, the scan shows diminished consolidation of the pleural surface and a small subpleural air space disease, stable at 8 x 4 mm and images through the upper abdomen and the re s t of the lung were negative. She is up to 37.5 of Effexor with good control of her hot flashes and taking her tamoxifen every other day and we have asked her to increase her tamoxifen to daily and we will see her back in 3 months and if she is tolerating this well, consider switching to an AI at that time.        Patient seen on 07/21/2015, tolerating tamoxifen well at full dose for the last 3 months since her hysterectomy. Plans to switch to Femara in 2-3 months. Nocturnal cough, which I suspect is allergic or reflux related. Consider a steroid inhaler.        7/17 Arimidex since July 2015.  Overall she is tolerating Arimidex well with very few hot flashes because of the Effexor but she is clearly not herself due to the postmenopausal state and has less energy low libido and I think some amount of depression which is not unusual in this situation.  Her mother had osteoporosis and her baseline bone density is osteopenia which is fairly severe and I added Boniva monthly in addition to calcium and vitamin D    .Her neck pain is resolved she is sleeping okay appetite is fair and apart from fatigue which is related to her postmenopausal state she appears to be doing okay.  She is a little more irritable than usual but realizes this is part of the postmenopausal state  Complains of some irritable bowel type symptoms with constipation alternating with diarrhea and she is a little more anemic than usual and her ferritin was low and she was given oral iron with good improvement in her  hemoglobin.  I'm reluctant to add another test with colonoscopy for at this point and we will wait and see how she does off iron and if her hemoglobin drops and her iron is low again the colonoscopy and EGD will be scheduled .  Her lymphedema is a little better this summer    She continues to have discomfort over the right chest wall where she received radiation and does not feel that the implant is his flexible and stiffer and because of the radiation fibrosis on CAT scan has been concerned about issues here.    Her liver function tests have been barely up on and off and therefore we will do another ultrasound to evaluate the liver and gallbladder.        SOCIAL HISTORY: She is , lives with her . She is school psychologist. She does not smoke. She drinks very minimally. No trouble with drug addiction.    FAMILY HISTORY: Parents are in their late 50s and are healthy. She has no siblings. She has a paternal aunt with breast cancer in 40s, maternal grandmother with breast cancer at age 58,   and both of the grandmother' s sisters had breast cancer in their 50s. She has a maternal uncle with prostate cancer at age 54. There is no ovarian, uterine or pancreatic cancer in the family that she is aware of. BRCA 1 positive    Review of Systems   Constitutional: Positive for fatigue.   Gastrointestinal: Positive for diarrhea.   Endocrine: Positive for heat intolerance.   Psychiatric/Behavioral: Positive for decreased concentration, dysphoric mood and sleep disturbance. The patient is nervous/anxious.    All other systems reviewed and are negative.     A comprehensive 14 point review of systems was performed and was negative except as mentioned.    Medications:  The current medication list was reviewed in the EMR    ALLERGIES:    Allergies   Allergen Reactions   • No Known Drug Allergy        Objective      Vitals:    11/13/17 1338   BP: 105/68   Pulse: 86   Resp: 12   Temp: 98.8 °F (37.1 °C)   SpO2: 100%  "  Weight: 115 lb (52.2 kg)   Height: 59.45\" (151 cm)   PainSc: 0-No pain     Current Status 11/13/2017   ECOG score 0       Physical Exam    GENERAL:  Well-developed, well-nourished in no acute distress.   SKIN:  Warm, dry without rashes, purpura or petechiae.  HEAD:  Normocephalic.  EYES:  Pupils equal, round and reactive to light.  EOMs intact.  Conjunctivae normal.  EARS:  Hearing intact.  NOSE:  Septum midline.  No excoriations or nasal discharge.  MOUTH:  Tongue is well-papillated; no stomatitis or ulcers.  Lips normal.  THROAT:  Oropharynx without lesions or exudates.  NECK:  Supple with good range of motion; no thyromegaly or masses, no JVD.  LYMPHATICS:  No cervical, supraclavicular, axillary or inguinal adenopathy.  CHEST:  Lungs clear to percussion and auscultation. Good airflow.  BREASTS: Bilateral implants benign there is no axillary adenopathy.Tenderness in the subcostal and intramammary rib cage right greater than left   CARDIAC:  Regular rate and rhythm without murmurs, rubs or gallops. Normal S1,S2.  ABDOMEN:  Soft, nontender with no organomegaly or masses.  EXTREMITIES:  No clubbing, cyanosis or edema.  NEUROLOGICAL:  Cranial Nerves II-XII grossly intact.  No focal neurological deficits.  PSYCHIATRIC:  Normal affect and mood.        RECENT LABS:  Hematology WBC   Date Value Ref Range Status   11/13/2017 4.92 4.50 - 10.70 10*3/mm3 Final     RBC   Date Value Ref Range Status   11/13/2017 4.37 3.90 - 5.20 10*6/mm3 Final     Hemoglobin   Date Value Ref Range Status   11/13/2017 13.7 11.9 - 15.5 g/dL Final     Hematocrit   Date Value Ref Range Status   11/13/2017 39.6 35.6 - 45.5 % Final     Platelets   Date Value Ref Range Status   11/13/2017 266 140 - 500 10*3/mm3 Final      FINDINGS: Sonographic evaluation of the liver and selective structures  of the right upper quadrant was performed. The right kidney has a normal  appearance. No abnormality of the liver is appreciated. The liver  measures on the " order of 17.5 cm in anterior to posterior dimension. The  gallbladder has a normal appearance. The common bile duct measures 0.4  cm in diameter. Per the sonographer, the patient was nontender in the  right upper quadrant during the examination. Visualized portion of the  pancreas appears normal.      IMPRESSION:  Negative liver sonogram.      This report was finalized on 4/14/2017   Ferritin 25    Assessment/Plan   1.[id number] triple negative right breast cancer. [id number] dose dense Adriamycin Cytoxan and weekly carbotaxol-on tamoxifen-switch to Arimidex after oophorectomy in July 2015  2.T1Nx left breast cancer invasive ductal carcinoma, ER/MO positive, HER2 negative. [id number]                  3. BRCA 1 positive. Post hysterectomy and oophorectomy                   4.  Lymphedema right arm improved with a sleeve                  5.  Mild depression declines any treatment for this now                  6.  Mild anemia due to iron deficiency responding to oral iron                  7.  Osteopenia moderately severe-declined Boniva  was started                   8.  Mildly elevated LFTs with negative liver and pancreas ultrasound       Plan  Continue Arimidex and Effexor and Boniva. no increase in Effexor dose at this time as she is managing fairly well with her depression.    I told her to take nonsteroidals like Aleve twice a day for the next 2 weeks and if the pain and discomfort in the rib cage on the right and the fullness in the right upper abdomen did not go away oh do a bone scan and refer her to GI for evaluation .Also suggested a stool softener because of the constipation to see if this will help with the fullness .  If the symptoms resolve she will return for follow-up in a month otherwise we will see her sooner                       11/13/2017      CC:

## 2018-01-29 RX ORDER — ANASTROZOLE 1 MG/1
TABLET ORAL
Qty: 30 TABLET | Refills: 1 | Status: SHIPPED | OUTPATIENT
Start: 2018-01-29 | End: 2018-04-12 | Stop reason: SDUPTHER

## 2018-02-14 RX ORDER — VENLAFAXINE HYDROCHLORIDE 37.5 MG/1
CAPSULE, EXTENDED RELEASE ORAL
Qty: 30 CAPSULE | Refills: 2 | Status: SHIPPED | OUTPATIENT
Start: 2018-02-14 | End: 2018-05-16 | Stop reason: SDUPTHER

## 2018-04-12 RX ORDER — ANASTROZOLE 1 MG/1
TABLET ORAL
Qty: 30 TABLET | Refills: 0 | Status: SHIPPED | OUTPATIENT
Start: 2018-04-12 | End: 2018-05-16 | Stop reason: SDUPTHER

## 2018-04-30 ENCOUNTER — LAB (OUTPATIENT)
Dept: OTHER | Facility: HOSPITAL | Age: 38
End: 2018-04-30

## 2018-04-30 ENCOUNTER — OFFICE VISIT (OUTPATIENT)
Dept: ONCOLOGY | Facility: CLINIC | Age: 38
End: 2018-04-30

## 2018-04-30 VITALS
TEMPERATURE: 98.1 F | DIASTOLIC BLOOD PRESSURE: 72 MMHG | BODY MASS INDEX: 23.4 KG/M2 | HEART RATE: 80 BPM | SYSTOLIC BLOOD PRESSURE: 105 MMHG | RESPIRATION RATE: 16 BRPM | OXYGEN SATURATION: 97 % | HEIGHT: 59 IN | WEIGHT: 116.1 LBS

## 2018-04-30 DIAGNOSIS — Z15.01 BRCA1 GENETIC CARRIER: ICD-10-CM

## 2018-04-30 DIAGNOSIS — Z79.811 LONG TERM CURRENT USE OF AROMATASE INHIBITOR: ICD-10-CM

## 2018-04-30 DIAGNOSIS — Z17.0 MALIGNANT NEOPLASM OF CENTRAL PORTION OF BOTH BREASTS IN FEMALE, ESTROGEN RECEPTOR POSITIVE (HCC): Primary | ICD-10-CM

## 2018-04-30 DIAGNOSIS — C50.111 BILATERAL MALIGNANT NEOPLASM OF CENTRAL PORTION OF BREAST IN FEMALE (HCC): ICD-10-CM

## 2018-04-30 DIAGNOSIS — Z15.09 BRCA1 GENETIC CARRIER: ICD-10-CM

## 2018-04-30 DIAGNOSIS — C50.111 MALIGNANT NEOPLASM OF CENTRAL PORTION OF BOTH BREASTS IN FEMALE, ESTROGEN RECEPTOR POSITIVE (HCC): Primary | ICD-10-CM

## 2018-04-30 DIAGNOSIS — C50.112 BILATERAL MALIGNANT NEOPLASM OF CENTRAL PORTION OF BREAST IN FEMALE (HCC): ICD-10-CM

## 2018-04-30 DIAGNOSIS — C50.112 MALIGNANT NEOPLASM OF CENTRAL PORTION OF BOTH BREASTS IN FEMALE, ESTROGEN RECEPTOR POSITIVE (HCC): Primary | ICD-10-CM

## 2018-04-30 LAB
ALBUMIN SERPL-MCNC: 4.2 G/DL (ref 3.5–5.2)
ALBUMIN/GLOB SERPL: 1.7 G/DL
ALP SERPL-CCNC: 92 U/L (ref 39–117)
ALT SERPL W P-5'-P-CCNC: 27 U/L (ref 1–33)
ANION GAP SERPL CALCULATED.3IONS-SCNC: 10.8 MMOL/L
AST SERPL-CCNC: 37 U/L (ref 1–32)
BASOPHILS # BLD AUTO: 0.01 10*3/MM3 (ref 0–0.2)
BASOPHILS NFR BLD AUTO: 0.2 % (ref 0–1.5)
BILIRUB SERPL-MCNC: 0.3 MG/DL (ref 0.1–1.2)
BUN BLD-MCNC: 14 MG/DL (ref 6–20)
BUN/CREAT SERPL: 26.4 (ref 7–25)
CALCIUM SPEC-SCNC: 9.2 MG/DL (ref 8.6–10.5)
CHLORIDE SERPL-SCNC: 104 MMOL/L (ref 98–107)
CO2 SERPL-SCNC: 28.2 MMOL/L (ref 22–29)
CREAT BLD-MCNC: 0.53 MG/DL (ref 0.57–1)
DEPRECATED RDW RBC AUTO: 43.6 FL (ref 37–54)
EOSINOPHIL # BLD AUTO: 0.04 10*3/MM3 (ref 0–0.7)
EOSINOPHIL NFR BLD AUTO: 0.8 % (ref 0.3–6.2)
ERYTHROCYTE [DISTWIDTH] IN BLOOD BY AUTOMATED COUNT: 13.3 % (ref 11.7–13)
GFR SERPL CREATININE-BSD FRML MDRD: 129 ML/MIN/1.73
GLOBULIN UR ELPH-MCNC: 2.5 GM/DL
GLUCOSE BLD-MCNC: 102 MG/DL (ref 65–99)
HCT VFR BLD AUTO: 40.3 % (ref 35.6–45.5)
HGB BLD-MCNC: 13.5 G/DL (ref 11.9–15.5)
IMM GRANULOCYTES # BLD: 0.01 10*3/MM3 (ref 0–0.03)
IMM GRANULOCYTES NFR BLD: 0.2 % (ref 0–0.5)
LYMPHOCYTES # BLD AUTO: 1.12 10*3/MM3 (ref 0.9–4.8)
LYMPHOCYTES NFR BLD AUTO: 21.4 % (ref 19.6–45.3)
MCH RBC QN AUTO: 30.3 PG (ref 26.9–32)
MCHC RBC AUTO-ENTMCNC: 33.5 G/DL (ref 32.4–36.3)
MCV RBC AUTO: 90.4 FL (ref 80.5–98.2)
MONOCYTES # BLD AUTO: 0.6 10*3/MM3 (ref 0.2–1.2)
MONOCYTES NFR BLD AUTO: 11.5 % (ref 5–12)
NEUTROPHILS # BLD AUTO: 3.45 10*3/MM3 (ref 1.9–8.1)
NEUTROPHILS NFR BLD AUTO: 65.9 % (ref 42.7–76)
NRBC BLD MANUAL-RTO: 0 /100 WBC (ref 0–0)
PLATELET # BLD AUTO: 286 10*3/MM3 (ref 140–500)
PMV BLD AUTO: 9.3 FL (ref 6–12)
POTASSIUM BLD-SCNC: 4.6 MMOL/L (ref 3.5–5.2)
PROT SERPL-MCNC: 6.7 G/DL (ref 6–8.5)
RBC # BLD AUTO: 4.46 10*6/MM3 (ref 3.9–5.2)
SODIUM BLD-SCNC: 143 MMOL/L (ref 136–145)
WBC NRBC COR # BLD: 5.23 10*3/MM3 (ref 4.5–10.7)

## 2018-04-30 PROCEDURE — 80053 COMPREHEN METABOLIC PANEL: CPT | Performed by: INTERNAL MEDICINE

## 2018-04-30 PROCEDURE — 85025 COMPLETE CBC W/AUTO DIFF WBC: CPT | Performed by: INTERNAL MEDICINE

## 2018-04-30 PROCEDURE — 36415 COLL VENOUS BLD VENIPUNCTURE: CPT

## 2018-04-30 PROCEDURE — 99213 OFFICE O/P EST LOW 20 MIN: CPT | Performed by: INTERNAL MEDICINE

## 2018-04-30 NOTE — PROGRESS NOTES
Subjective      EASONS FOR FOLLOWUP:   1. [id number] triple negative right breast cancer.   2. T1Nx left breast cancer invasive ductal carcinoma, ER/NC positive, HER2 negative.   3. BRCA 1 positive.   4. Right internal mammary node positive on CT scan.   5. Neoadjuvant dose dense Adriamycin/Cytoxan, weekly Taxol started on 01/20/2014 with plan for axillary dissection on the right and sentinel node on left, bilateral mastectomies plus radiation to the right breast and internal mammary nodes.   6. Addition of carboplatin with weekly Taxol to start on 03/24/2014.   7. Carboplatin held on 05/05/2014 due to thrombocytopenia.   8. The patient was seen on 08/05/2014, postoperatively; bilateral mastectomies and sentinel nodes with a 5 mm residua l grade 3 tumor in the right breast with two negative sentinel nodes (cannot tell if the node with a clip was removed). Complete response in the left breast with two negative sentinel nodes. The decision was made to consider radiation because of her pret reatment internal mammary node on the right and tamoxifen therapy for the hormone positive breast cancer on right. Clinical trials are being looked into.   9. Additional surgery to remove the lymph node with the clip in the right axilla, 3 nodes removed and negative for metastatic disease. Tamoxifen started September 2014.   10. Radiation completed in October 2014. Tamoxifen on hold because of some neurological signs.   11. Not eligible for NSABP B-55 because of tamoxifen as of 10/20/2014.   12. Tamoxifen resumed on 11/18/2014 on every other day dose.   13. CT scans done for right neck discomfort with negative scans except for radiation scarring in the right lung apex.   14. Post total abdominal hysterectomy and BSO in 01/2015. Tamoxifen resumed with switch to an AI in 7/15  15. Lymphedema, right arm.                   16.  Iron deficiency anemia    History of Present Illness  patient is a 36-year-old white female with BRCA1  positivity and bilateral breast cancers currently on Arimidex for the last 2 .5 years after 1 year of tamoxifen.  Comes in for follow-up and overall she is doing fairly.  She remains anxious but her depression is somewhat better and she did not make the appointment to see the psychiatrist.  She is not sleeping well .  She feels that she is dependent on the Effexor and when he gets close to the 24-hour time, She feels slightly dysphoric until she takes her Effexor.   She continues to have irritable bowel symptoms and now constipation is more problematic.     She continues to have some tenderness in the rib cage below the right implant and is worried about this.  She also has tightness and fullness in the right upper abdomen and bloating on and off but no nausea or vomiting.  His symptoms have been persistent for at least 2 years with no obvious pathology and I'm a little less concerned about that for now.  She's had some issues with weight gain which are related to her postmenopausal state and she understands this    Active Ambulatory Problems     Diagnosis Date Noted   • Malignant neoplasm of female breast 04/15/2016   • BRCA1 genetic carrier 04/15/2016   • Anemia 2016   • Long term current use of aromatase inhibitor 2016   • Osteopenia 2016     Resolved Ambulatory Problems     Diagnosis Date Noted   • No Resolved Ambulatory Problems     Past Medical History:   Diagnosis Date   • Cancer    • History of radiation therapy    • Lymphedema      Past Surgical History:   Procedure Laterality Date   • BREAST SURGERY Bilateral 2014    Mastectomy   •  SECTION  , 2010    X2   • HYSTERECTOMY  2015    Total         OB/GYN HISTORY: She is  2, para 2. Menarche at age 10. First childbirth wa s at age 26. She breast-fed her second child for 6 weeks. She was on birth control until her first childbirth and then used a Mirena IUD which she just had removed 3 months ago.     HEMATOLOGIC/ONCOLOGIC  HISTORY:   The patient was seen on 08/05/2014, having had bilateral mastectomies. Right breast had a 5 mm residual invasive tumor, grade 3, with clear margins, two negative sentinel nodes (I cannot tell if the lymph node with the clip was remove d and we will have to assess this). Left breast shows a complete pathological response with two negative sentinel nodes. The decision was made to review her for radiation therapy because of enlarged right internal mammary node, pretreatment, and to star t tamoxifen. She is going to have her ovaries removed later in the year when she has expanders put in and we can switch to an AI at that point. I am looking into clinical trials for BCRA positive patients with residual disease and for triple negative canc ers with residual disease but the 5 mm tumor may disqualify her.        The patient was seen on 09/02/2014 with additional surgery to remove the lymph node in her axilla with the clip in it which was thankfully benign in addition to 3 other nodes which were nega tive. Radiation to the internal mammary chain planned plus tamoxifen for 5 years. The NSABP B55 study is expected to have an amendment including hormone positive patients and we will revisit this issue after her radiation. Her port has been removed.        NSA BP amendment has not yet gone through to include patients on tamoxifen, therefore, she is not eligible for NSABP B-55 as of 10/21/2014. Tamoxifen held for 1 month because of some dizzy spells and hysterectomy and oophorectomy planned for early 2015, at wh ich point we will switch to Arimidex.        The patient was seen on 02/25/2014 having CT scans because a preop chest x-ray before hysterectomy showed a shadow in the right apex. CT scans showed what seemed to be radiation change in the apex of the right lung. No abnormality in the neck or abdomen and a residual small seroma in the right axilla. Path reports on her hysterectomy and BSO were benign and because  of her intolerance of tamoxifen, we started her on Effexor 12.5 b.i.d. to be increased as tolerated and t hen to resume tamoxifen every other day for a month and then go to daily if possible. Once she is stable at this, we will try and switch over to an AI if she can tolerate this.        The patient was seen on 04/21/2015 with a CT scan done to followup on changes noted on a previous CT scans, which were felt to be radiation-related. Thankfully, the scan shows diminished consolidation of the pleural surface and a small subpleural air space disease, stable at 8 x 4 mm and images through the upper abdomen and the re s t of the lung were negative. She is up to 37.5 of Effexor with good control of her hot flashes and taking her tamoxifen every other day and we have asked her to increase her tamoxifen to daily and we will see her back in 3 months and if she is tolerating this well, consider switching to an AI at that time.        Patient seen on 07/21/2015, tolerating tamoxifen well at full dose for the last 3 months since her hysterectomy. Plans to switch to Femara in 2-3 months. Nocturnal cough, which I suspect is allergic or reflux related. Consider a steroid inhaler.        7/17 Arimidex since July 2015.  Overall she is tolerating Arimidex well with very few hot flashes because of the Effexor but she is clearly not herself due to the postmenopausal state and has less energy low libido and I think some amount of depression which is not unusual in this situation.  Her mother had osteoporosis and her baseline bone density is osteopenia which is fairly severe and I added Boniva monthly in addition to calcium and vitamin D    .Her neck pain is resolved she is sleeping okay appetite is fair and apart from fatigue which is related to her postmenopausal state she appears to be doing okay.  She is a little more irritable than usual but realizes this is part of the postmenopausal state  Complains of some irritable bowel type  symptoms with constipation alternating with diarrhea and she is a little more anemic than usual and her ferritin was low and she was given oral iron with good improvement in her hemoglobin.  I'm reluctant to add another test with colonoscopy for at this point and we will wait and see how she does off iron and if her hemoglobin drops and her iron is low again the colonoscopy and EGD will be scheduled .  Her lymphedema is a little better this summer    She continues to have discomfort over the right chest wall where she received radiation and does not feel that the implant is his flexible and stiffer and because of the radiation fibrosis on CAT scan has been concerned about issues here.    Her liver function tests have been barely up on and off and therefore we will do another ultrasound to evaluate the liver and gallbladder.        SOCIAL HISTORY: She is , lives with her . She is school psychologist. She does not smoke. She drinks very minimally. No trouble with drug addiction.    FAMILY HISTORY: Parents are in their late 50s and are healthy. She has no siblings. She has a paternal aunt with breast cancer in 40s, maternal grandmother with breast cancer at age 58,   and both of the grandmother' s sisters had breast cancer in their 50s. She has a maternal uncle with prostate cancer at age 54. There is no ovarian, uterine or pancreatic cancer in the family that she is aware of. BRCA 1 positive    Review of Systems   Constitutional: Positive for fatigue.   Gastrointestinal: Positive for diarrhea.   Endocrine: Positive for heat intolerance.   Psychiatric/Behavioral: Positive for decreased concentration, dysphoric mood and sleep disturbance. The patient is nervous/anxious.    All other systems reviewed and are negative.     A comprehensive 14 point review of systems was performed and was negative except as mentioned.    Medications:  The current medication list was reviewed in the EMR    ALLERGIES:   "  Allergies   Allergen Reactions   • No Known Drug Allergy        Objective      Vitals:    04/30/18 1333   BP: 105/72   Pulse: 80   Resp: 16   Temp: 98.1 °F (36.7 °C)   TempSrc: Oral   SpO2: 97%   Weight: 52.7 kg (116 lb 1.6 oz)   Height: 151 cm (59.45\")   PainSc: 0-No pain     Current Status 4/30/2018   ECOG score 0       Physical Exam    GENERAL:  Well-developed, well-nourished in no acute distress.   SKIN:  Warm, dry without rashes, purpura or petechiae.  HEAD:  Normocephalic.  EYES:  Pupils equal, round and reactive to light.  EOMs intact.  Conjunctivae normal.  EARS:  Hearing intact.  NOSE:  Septum midline.  No excoriations or nasal discharge.  MOUTH:  Tongue is well-papillated; no stomatitis or ulcers.  Lips normal.  THROAT:  Oropharynx without lesions or exudates.  NECK:  Supple with good range of motion; no thyromegaly or masses, no JVD.  LYMPHATICS:  No cervical, supraclavicular, axillary or inguinal adenopathy.  CHEST:  Lungs clear to percussion and auscultation. Good airflow.  BREASTS: Bilateral implants benign there is no axillary adenopathy.Tenderness in the subcostal and intramammary rib cage right greater than left   CARDIAC:  Regular rate and rhythm without murmurs, rubs or gallops. Normal S1,S2.  ABDOMEN:  Soft, nontender with no organomegaly or masses.  EXTREMITIES:  No clubbing, cyanosis or edema.  NEUROLOGICAL:  Cranial Nerves II-XII grossly intact.  No focal neurological deficits.  PSYCHIATRIC:  Normal affect and mood.    Exam unchanged    RECENT LABS:  Hematology WBC   Date Value Ref Range Status   04/30/2018 5.23 4.50 - 10.70 10*3/mm3 Final     RBC   Date Value Ref Range Status   04/30/2018 4.46 3.90 - 5.20 10*6/mm3 Final     Hemoglobin   Date Value Ref Range Status   04/30/2018 13.5 11.9 - 15.5 g/dL Final     Hematocrit   Date Value Ref Range Status   04/30/2018 40.3 35.6 - 45.5 % Final     Platelets   Date Value Ref Range Status   04/30/2018 286 140 - 500 10*3/mm3 Final      FINDINGS: " Sonographic evaluation of the liver and selective structures  of the right upper quadrant was performed. The right kidney has a normal  appearance. No abnormality of the liver is appreciated. The liver  measures on the order of 17.5 cm in anterior to posterior dimension. The  gallbladder has a normal appearance. The common bile duct measures 0.4  cm in diameter. Per the sonographer, the patient was nontender in the  right upper quadrant during the examination. Visualized portion of the  pancreas appears normal.      IMPRESSION:  Negative liver sonogram.      This report was finalized on 4/14/2017       Assessment/Plan   1.[id number] triple negative right breast cancer. [id number] dose dense Adriamycin Cytoxan and weekly carbotaxol-on tamoxifen-switch to Arimidex after oophorectomy in July 2015  2.T1Nx left breast cancer invasive ductal carcinoma, ER/TN positive, HER2 negative. [id number]                  3. BRCA 1 positive. Post hysterectomy and oophorectomy                   4.  Lymphedema right arm improved with a sleeve                  5.  Mild depression declines any treatment for this now                  6.  Mild anemia due to iron deficiency responding to oral iron                  7.  Osteopenia moderately severe-declined Boniva  was started                   8.  Mildly elevated LFTs with negative liver and pancreas ultrasound       Plan  1.Continue Arimidex and Effexor  2. . no increase in Effexor dose at this time as she is managing fairly well with her depression.    3 return in 6 months for follow-up.                   4/30/2018      CC:

## 2018-05-16 RX ORDER — VENLAFAXINE HYDROCHLORIDE 37.5 MG/1
CAPSULE, EXTENDED RELEASE ORAL
Qty: 30 CAPSULE | Refills: 1 | Status: SHIPPED | OUTPATIENT
Start: 2018-05-16 | End: 2018-07-12 | Stop reason: SDUPTHER

## 2018-05-16 RX ORDER — ANASTROZOLE 1 MG/1
TABLET ORAL
Qty: 30 TABLET | Refills: 0 | Status: SHIPPED | OUTPATIENT
Start: 2018-05-16 | End: 2018-06-15 | Stop reason: SDUPTHER

## 2018-06-15 RX ORDER — ANASTROZOLE 1 MG/1
TABLET ORAL
Qty: 30 TABLET | Refills: 3 | Status: SHIPPED | OUTPATIENT
Start: 2018-06-15 | End: 2018-10-24 | Stop reason: SDUPTHER

## 2018-07-12 RX ORDER — VENLAFAXINE HYDROCHLORIDE 37.5 MG/1
CAPSULE, EXTENDED RELEASE ORAL
Qty: 30 CAPSULE | Refills: 0 | Status: SHIPPED | OUTPATIENT
Start: 2018-07-12 | End: 2018-08-12 | Stop reason: SDUPTHER

## 2018-08-13 RX ORDER — VENLAFAXINE HYDROCHLORIDE 37.5 MG/1
CAPSULE, EXTENDED RELEASE ORAL
Qty: 30 CAPSULE | Refills: 3 | Status: SHIPPED | OUTPATIENT
Start: 2018-08-13 | End: 2018-12-10 | Stop reason: SDUPTHER

## 2018-10-22 ENCOUNTER — OFFICE VISIT (OUTPATIENT)
Dept: ONCOLOGY | Facility: CLINIC | Age: 38
End: 2018-10-22

## 2018-10-22 ENCOUNTER — LAB (OUTPATIENT)
Dept: OTHER | Facility: HOSPITAL | Age: 38
End: 2018-10-22

## 2018-10-22 VITALS
HEART RATE: 86 BPM | WEIGHT: 115 LBS | TEMPERATURE: 98 F | RESPIRATION RATE: 16 BRPM | HEIGHT: 59 IN | SYSTOLIC BLOOD PRESSURE: 100 MMHG | BODY MASS INDEX: 23.18 KG/M2 | OXYGEN SATURATION: 95 % | DIASTOLIC BLOOD PRESSURE: 68 MMHG

## 2018-10-22 DIAGNOSIS — C50.111 MALIGNANT NEOPLASM OF CENTRAL PORTION OF BOTH BREASTS IN FEMALE, ESTROGEN RECEPTOR POSITIVE (HCC): ICD-10-CM

## 2018-10-22 DIAGNOSIS — Z17.0 MALIGNANT NEOPLASM OF CENTRAL PORTION OF BOTH BREASTS IN FEMALE, ESTROGEN RECEPTOR POSITIVE (HCC): ICD-10-CM

## 2018-10-22 DIAGNOSIS — Z17.0 MALIGNANT NEOPLASM OF CENTRAL PORTION OF BOTH BREASTS IN FEMALE, ESTROGEN RECEPTOR POSITIVE (HCC): Primary | ICD-10-CM

## 2018-10-22 DIAGNOSIS — C50.112 MALIGNANT NEOPLASM OF CENTRAL PORTION OF BOTH BREASTS IN FEMALE, ESTROGEN RECEPTOR POSITIVE (HCC): ICD-10-CM

## 2018-10-22 DIAGNOSIS — Z15.09 BRCA1 GENETIC CARRIER: ICD-10-CM

## 2018-10-22 DIAGNOSIS — Z15.01 BRCA1 GENETIC CARRIER: ICD-10-CM

## 2018-10-22 DIAGNOSIS — C50.112 MALIGNANT NEOPLASM OF CENTRAL PORTION OF BOTH BREASTS IN FEMALE, ESTROGEN RECEPTOR POSITIVE (HCC): Primary | ICD-10-CM

## 2018-10-22 DIAGNOSIS — C50.111 MALIGNANT NEOPLASM OF CENTRAL PORTION OF BOTH BREASTS IN FEMALE, ESTROGEN RECEPTOR POSITIVE (HCC): Primary | ICD-10-CM

## 2018-10-22 LAB
ALBUMIN SERPL-MCNC: 3.6 G/DL (ref 3.5–5.2)
ALBUMIN/GLOB SERPL: 1.4 G/DL
ALP SERPL-CCNC: 97 U/L (ref 39–117)
ALT SERPL W P-5'-P-CCNC: 30 U/L (ref 1–33)
ANION GAP SERPL CALCULATED.3IONS-SCNC: 8.1 MMOL/L
AST SERPL-CCNC: 44 U/L (ref 1–32)
BASOPHILS # BLD AUTO: 0.01 10*3/MM3 (ref 0–0.2)
BASOPHILS NFR BLD AUTO: 0.2 % (ref 0–1.5)
BILIRUB SERPL-MCNC: 0.2 MG/DL (ref 0.1–1.2)
BUN BLD-MCNC: 16 MG/DL (ref 6–20)
BUN/CREAT SERPL: 36.4 (ref 7–25)
CALCIUM SPEC-SCNC: 8.5 MG/DL (ref 8.6–10.5)
CHLORIDE SERPL-SCNC: 103 MMOL/L (ref 98–107)
CO2 SERPL-SCNC: 27.9 MMOL/L (ref 22–29)
CREAT BLD-MCNC: 0.44 MG/DL (ref 0.57–1)
DEPRECATED RDW RBC AUTO: 41.3 FL (ref 37–54)
EOSINOPHIL # BLD AUTO: 0.03 10*3/MM3 (ref 0–0.7)
EOSINOPHIL NFR BLD AUTO: 0.6 % (ref 0.3–6.2)
ERYTHROCYTE [DISTWIDTH] IN BLOOD BY AUTOMATED COUNT: 12.6 % (ref 11.7–13)
GFR SERPL CREATININE-BSD FRML MDRD: >150 ML/MIN/1.73
GLOBULIN UR ELPH-MCNC: 2.6 GM/DL
GLUCOSE BLD-MCNC: 89 MG/DL (ref 65–99)
HCT VFR BLD AUTO: 41.4 % (ref 35.6–45.5)
HGB BLD-MCNC: 13.6 G/DL (ref 11.9–15.5)
IMM GRANULOCYTES # BLD: 0.02 10*3/MM3 (ref 0–0.03)
IMM GRANULOCYTES NFR BLD: 0.4 % (ref 0–0.5)
LYMPHOCYTES # BLD AUTO: 0.87 10*3/MM3 (ref 0.9–4.8)
LYMPHOCYTES NFR BLD AUTO: 18.1 % (ref 19.6–45.3)
MCH RBC QN AUTO: 29.9 PG (ref 26.9–32)
MCHC RBC AUTO-ENTMCNC: 32.9 G/DL (ref 32.4–36.3)
MCV RBC AUTO: 91 FL (ref 80.5–98.2)
MONOCYTES # BLD AUTO: 0.54 10*3/MM3 (ref 0.2–1.2)
MONOCYTES NFR BLD AUTO: 11.2 % (ref 5–12)
NEUTROPHILS # BLD AUTO: 3.34 10*3/MM3 (ref 1.9–8.1)
NEUTROPHILS NFR BLD AUTO: 69.5 % (ref 42.7–76)
NRBC BLD MANUAL-RTO: 0 /100 WBC (ref 0–0)
PLATELET # BLD AUTO: 280 10*3/MM3 (ref 140–500)
PMV BLD AUTO: 9.5 FL (ref 6–12)
POTASSIUM BLD-SCNC: 5.2 MMOL/L (ref 3.5–5.2)
PROT SERPL-MCNC: 6.2 G/DL (ref 6–8.5)
RBC # BLD AUTO: 4.55 10*6/MM3 (ref 3.9–5.2)
SODIUM BLD-SCNC: 139 MMOL/L (ref 136–145)
WBC NRBC COR # BLD: 4.81 10*3/MM3 (ref 4.5–10.7)

## 2018-10-22 PROCEDURE — 99214 OFFICE O/P EST MOD 30 MIN: CPT | Performed by: INTERNAL MEDICINE

## 2018-10-22 PROCEDURE — 36415 COLL VENOUS BLD VENIPUNCTURE: CPT

## 2018-10-22 PROCEDURE — 85025 COMPLETE CBC W/AUTO DIFF WBC: CPT | Performed by: INTERNAL MEDICINE

## 2018-10-22 PROCEDURE — 80053 COMPREHEN METABOLIC PANEL: CPT | Performed by: INTERNAL MEDICINE

## 2018-10-22 PROCEDURE — 86304 IMMUNOASSAY TUMOR CA 125: CPT | Performed by: INTERNAL MEDICINE

## 2018-10-22 NOTE — PROGRESS NOTES
Subjective      EASONS FOR FOLLOWUP:   1. [id number] triple negative right breast cancer.   2. T1Nx left breast cancer invasive ductal carcinoma, ER/ND positive, HER2 negative.   3. BRCA 1 positive.   4. Right internal mammary node positive on CT scan.   5. Neoadjuvant dose dense Adriamycin/Cytoxan, weekly Taxol started on 01/20/2014 with plan for axillary dissection on the right and sentinel node on left, bilateral mastectomies plus radiation to the right breast and internal mammary nodes.   6. Addition of carboplatin with weekly Taxol to start on 03/24/2014.   7. Carboplatin held on 05/05/2014 due to thrombocytopenia.   8. The patient was seen on 08/05/2014, postoperatively; bilateral mastectomies and sentinel nodes with a 5 mm residua l grade 3 tumor in the right breast with two negative sentinel nodes (cannot tell if the node with a clip was removed). Complete response in the left breast with two negative sentinel nodes. The decision was made to consider radiation because of her pret reatment internal mammary node on the right and tamoxifen therapy for the hormone positive breast cancer on right. Clinical trials are being looked into.   9. Additional surgery to remove the lymph node with the clip in the right axilla, 3 nodes removed and negative for metastatic disease. Tamoxifen started September 2014.   10. Radiation completed in October 2014. Tamoxifen on hold because of some neurological signs.   11. Not eligible for NSABP B-55 because of tamoxifen as of 10/20/2014.   12. Tamoxifen resumed on 11/18/2014 on every other day dose.   13. CT scans done for right neck discomfort with negative scans except for radiation scarring in the right lung apex.   14. Post total abdominal hysterectomy and BSO in 01/2015. Tamoxifen resumed with switch to an AI in 7/15  15. Lymphedema, right arm.                   16.  Iron deficiency anemia    History of Present Illness  patient is a 36-year-old white female with BRCA1  positivity and bilateral breast cancers currently on Arimidex for the last 3 years after 1 year of tamoxifen.  Comes in for follow-up and overall she is doing fairly.  She remains anxious but her depression is somewhat better and she did not make the appointment to see the psychiatrist.  She is not sleeping well .  She feels that she is dependent on the Effexor and when he gets close to the 24-hour time, She feels slightly dysphoric until she takes her Effexor.   She continues to have irritable bowel symptoms and now constipation is more problematic.     She continues to have some tenderness in the rib cage below the right implant and is worried about this.  She also has tightness and fullness in the right upper abdomen and bloating on and off but no nausea or vomiting.  His symptoms have been persistent for at least 2 years with no obvious pathology and I'm a little less concerned about that for now.  She's had some issues with weight gain which are related to her postmenopausal state and she understands this  Because of persistent tenderness in the rib area which sounds like costochondritis we will do a bone scan and repeat her bone density and she has not been taking her Boniva      Active Ambulatory Problems     Diagnosis Date Noted   • Malignant neoplasm of female breast (CMS/HCC) 04/15/2016   • BRCA1 genetic carrier 04/15/2016   • Anemia 2016   • Long term current use of aromatase inhibitor 2016   • Osteopenia 2016     Resolved Ambulatory Problems     Diagnosis Date Noted   • No Resolved Ambulatory Problems     Past Medical History:   Diagnosis Date   • Cancer (CMS/HCC)    • History of radiation therapy    • Lymphedema      Past Surgical History:   Procedure Laterality Date   • BREAST SURGERY Bilateral 2014    Mastectomy   •  SECTION  , 2010    X2   • HYSTERECTOMY  2015    Total         OB/GYN HISTORY: She is  2, para 2. Menarche at age 10. First childbirth wa s at age 26.  She breast-fed her second child for 6 weeks. She was on birth control until her first childbirth and then used a Mirena IUD which she just had removed 3 months ago.     HEMATOLOGIC/ONCOLOGIC HISTORY:   The patient was seen on 08/05/2014, having had bilateral mastectomies. Right breast had a 5 mm residual invasive tumor, grade 3, with clear margins, two negative sentinel nodes (I cannot tell if the lymph node with the clip was remove d and we will have to assess this). Left breast shows a complete pathological response with two negative sentinel nodes. The decision was made to review her for radiation therapy because of enlarged right internal mammary node, pretreatment, and to star t tamoxifen. She is going to have her ovaries removed later in the year when she has expanders put in and we can switch to an AI at that point. I am looking into clinical trials for BCRA positive patients with residual disease and for triple negative canc ers with residual disease but the 5 mm tumor may disqualify her.        The patient was seen on 09/02/2014 with additional surgery to remove the lymph node in her axilla with the clip in it which was thankfully benign in addition to 3 other nodes which were nega tive. Radiation to the internal mammary chain planned plus tamoxifen for 5 years. The NSABP B55 study is expected to have an amendment including hormone positive patients and we will revisit this issue after her radiation. Her port has been removed.        NSA BP amendment has not yet gone through to include patients on tamoxifen, therefore, she is not eligible for NSABP B-55 as of 10/21/2014. Tamoxifen held for 1 month because of some dizzy spells and hysterectomy and oophorectomy planned for early 2015, at wh ich point we will switch to Arimidex.        The patient was seen on 02/25/2014 having CT scans because a preop chest x-ray before hysterectomy showed a shadow in the right apex. CT scans showed what seemed to be radiation  change in the apex of the right lung. No abnormality in the neck or abdomen and a residual small seroma in the right axilla. Path reports on her hysterectomy and BSO were benign and because of her intolerance of tamoxifen, we started her on Effexor 12.5 b.i.d. to be increased as tolerated and t hen to resume tamoxifen every other day for a month and then go to daily if possible. Once she is stable at this, we will try and switch over to an AI if she can tolerate this.        The patient was seen on 04/21/2015 with a CT scan done to followup on changes noted on a previous CT scans, which were felt to be radiation-related. Thankfully, the scan shows diminished consolidation of the pleural surface and a small subpleural air space disease, stable at 8 x 4 mm and images through the upper abdomen and the re s t of the lung were negative. She is up to 37.5 of Effexor with good control of her hot flashes and taking her tamoxifen every other day and we have asked her to increase her tamoxifen to daily and we will see her back in 3 months and if she is tolerating this well, consider switching to an AI at that time.        Patient seen on 07/21/2015, tolerating tamoxifen well at full dose for the last 3 months since her hysterectomy. Plans to switch to Femara in 2-3 months. Nocturnal cough, which I suspect is allergic or reflux related. Consider a steroid inhaler.        7/17 Arimidex since July 2015.  Overall she is tolerating Arimidex well with very few hot flashes because of the Effexor but she is clearly not herself due to the postmenopausal state and has less energy low libido and I think some amount of depression which is not unusual in this situation.  Her mother had osteoporosis and her baseline bone density is osteopenia which is fairly severe and I added Boniva monthly in addition to calcium and vitamin D    .Her neck pain is resolved she is sleeping okay appetite is fair and apart from fatigue which is related to  her postmenopausal state she appears to be doing okay.  She is a little more irritable than usual but realizes this is part of the postmenopausal state  Complains of some irritable bowel type symptoms with constipation alternating with diarrhea and she is a little more anemic than usual and her ferritin was low and she was given oral iron with good improvement in her hemoglobin.  I'm reluctant to add another test with colonoscopy for at this point and we will wait and see how she does off iron and if her hemoglobin drops and her iron is low again the colonoscopy and EGD will be scheduled .  Her lymphedema is a little better this summer    She continues to have discomfort over the right chest wall where she received radiation and does not feel that the implant is his flexible and stiffer and because of the radiation fibrosis on CAT scan has been concerned about issues here.    Her liver function tests have been barely up on and off and therefore we will do another ultrasound to evaluate the liver and gallbladder.        SOCIAL HISTORY: She is , lives with her . She is school psychologist. She does not smoke. She drinks very minimally. No trouble with drug addiction.    FAMILY HISTORY: Parents are in their late 50s and are healthy. She has no siblings. She has a paternal aunt with breast cancer in 40s, maternal grandmother with breast cancer at age 58,   and both of the grandmother' s sisters had breast cancer in their 50s. She has a maternal uncle with prostate cancer at age 54. There is no ovarian, uterine or pancreatic cancer in the family that she is aware of. BRCA 1 positive    Review of Systems   Constitutional: Positive for fatigue (no changes 10/22/2018).   HENT: Negative.    Respiratory: Negative.    Cardiovascular: Negative.    Gastrointestinal: Positive for constipation (constipation and diarrhea 10/22/2018) and diarrhea (no change 10/22/2018).   Endocrine: Positive for heat intolerance (no  "change 10/22/2018).   Genitourinary: Negative.    Neurological: Negative.    Psychiatric/Behavioral: Positive for decreased concentration (no change 10/22/50474), dysphoric mood (no change 10/22/2018) and sleep disturbance (getting better 10/22/2018). The patient is nervous/anxious (no change 10/22/2018).    All other systems reviewed and are negative.     A comprehensive 14 point review of systems was performed and was negative except as mentioned.    Medications:  The current medication list was reviewed in the EMR    ALLERGIES:    Allergies   Allergen Reactions   • No Known Drug Allergy        Objective      Vitals:    10/22/18 0832   Height: 151 cm (59.45\")     Current Status 10/22/2018   ECOG score 0       Physical Exam    GENERAL:  Well-developed, well-nourished in no acute distress.   SKIN:  Warm, dry without rashes, purpura or petechiae.  HEAD:  Normocephalic.  EYES:  Pupils equal, round and reactive to light.  EOMs intact.  Conjunctivae normal.  EARS:  Hearing intact.  NOSE:  Septum midline.  No excoriations or nasal discharge.  MOUTH:  Tongue is well-papillated; no stomatitis or ulcers.  Lips normal.  THROAT:  Oropharynx without lesions or exudates.  NECK:  Supple with good range of motion; no thyromegaly or masses, no JVD.  LYMPHATICS:  No cervical, supraclavicular, axillary or inguinal adenopathy.  CHEST:  Lungs clear to percussion and auscultation. Good airflow.  BREASTS: Bilateral implants benign there is no axillary adenopathy.Tenderness in the subcostal and intramammary rib cage right greater than left -point tenderness in the costochondral junction below the right breast  CARDIAC:  Regular rate and rhythm without murmurs, rubs or gallops. Normal S1,S2.  ABDOMEN:  Soft, nontender with no organomegaly or masses.  EXTREMITIES:  No clubbing, cyanosis or edema.  NEUROLOGICAL:  Cranial Nerves II-XII grossly intact.  No focal neurological deficits.  PSYCHIATRIC:  Normal affect and mood.    Exam " unchanged    RECENT LABS:  Hematology WBC   Date Value Ref Range Status   04/30/2018 5.23 4.50 - 10.70 10*3/mm3 Final     RBC   Date Value Ref Range Status   04/30/2018 4.46 3.90 - 5.20 10*6/mm3 Final     Hemoglobin   Date Value Ref Range Status   04/30/2018 13.5 11.9 - 15.5 g/dL Final     Hematocrit   Date Value Ref Range Status   04/30/2018 40.3 35.6 - 45.5 % Final     Platelets   Date Value Ref Range Status   04/30/2018 286 140 - 500 10*3/mm3 Final      FINDINGS: Sonographic evaluation of the liver and selective structures  of the right upper quadrant was performed. The right kidney has a normal  appearance. No abnormality of the liver is appreciated. The liver  measures on the order of 17.5 cm in anterior to posterior dimension. The  gallbladder has a normal appearance. The common bile duct measures 0.4  cm in diameter. Per the sonographer, the patient was nontender in the  right upper quadrant during the examination. Visualized portion of the  pancreas appears normal.      IMPRESSION:  Negative liver sonogram.      This report was finalized on 4/14/2017       Assessment/Plan   1.[id number] triple negative right breast cancer. [id number] dose dense Adriamycin Cytoxan and weekly carbotaxol-on tamoxifen-switch to Arimidex after oophorectomy in July 2015  2.T1Nx left breast cancer invasive ductal carcinoma, ER/NH positive, HER2 negative. [id number]                  3. BRCA 1 positive. Post hysterectomy and oophorectomy                   4.  Lymphedema right arm improved with a sleeve                  5.  Mild depression declines any treatment for this now                  6.  Mild anemia due to iron deficiency responding to oral iron                  7.  Osteopenia moderately severe-declined Boniva  was started                   8.  Mildly elevated LFTs with negative liver and pancreas ultrasound       Plan  1.Continue Arimidex and Effexor  2. . Bone density and bone scan next week and she will call for the results   3  return in 6 months for follow-up.  In August she'll be 5 years on an abnormal blockade and we will consider stopping especially since she had a complete pathological response to chemotherapy in her hormone positive left breast cancer                  10/22/2018      CC:

## 2018-10-23 LAB — CANCER AG125 SERPL-ACNC: 3.4 U/ML (ref 0–38.1)

## 2018-10-24 RX ORDER — ANASTROZOLE 1 MG/1
TABLET ORAL
Qty: 30 TABLET | Refills: 2 | Status: SHIPPED | OUTPATIENT
Start: 2018-10-24 | End: 2019-02-01 | Stop reason: SDUPTHER

## 2018-11-09 ENCOUNTER — HOSPITAL ENCOUNTER (OUTPATIENT)
Dept: BONE DENSITY | Facility: HOSPITAL | Age: 38
Discharge: HOME | End: 2018-11-09
Attending: INTERNAL MEDICINE | Admitting: INTERNAL MEDICINE

## 2018-11-09 DIAGNOSIS — C50.112 MALIGNANT NEOPLASM OF CENTRAL PORTION OF BOTH BREASTS IN FEMALE, ESTROGEN RECEPTOR POSITIVE (HCC): ICD-10-CM

## 2018-11-09 DIAGNOSIS — Z15.01 BRCA1 GENETIC CARRIER: ICD-10-CM

## 2018-11-09 DIAGNOSIS — C50.111 MALIGNANT NEOPLASM OF CENTRAL PORTION OF BOTH BREASTS IN FEMALE, ESTROGEN RECEPTOR POSITIVE (HCC): ICD-10-CM

## 2018-11-09 DIAGNOSIS — Z17.0 MALIGNANT NEOPLASM OF CENTRAL PORTION OF BOTH BREASTS IN FEMALE, ESTROGEN RECEPTOR POSITIVE (HCC): ICD-10-CM

## 2018-11-09 DIAGNOSIS — Z15.09 BRCA1 GENETIC CARRIER: ICD-10-CM

## 2018-11-09 PROCEDURE — 77080 DXA BONE DENSITY AXIAL: CPT

## 2018-11-16 ENCOUNTER — HOSPITAL ENCOUNTER (OUTPATIENT)
Dept: NUCLEAR MEDICINE | Facility: HOSPITAL | Age: 38
Discharge: HOME | End: 2018-11-16
Attending: INTERNAL MEDICINE

## 2018-11-16 DIAGNOSIS — Z17.0 MALIGNANT NEOPLASM OF CENTRAL PORTION OF BOTH BREASTS IN FEMALE, ESTROGEN RECEPTOR POSITIVE (HCC): ICD-10-CM

## 2018-11-16 DIAGNOSIS — C50.112 MALIGNANT NEOPLASM OF CENTRAL PORTION OF BOTH BREASTS IN FEMALE, ESTROGEN RECEPTOR POSITIVE (HCC): ICD-10-CM

## 2018-11-16 DIAGNOSIS — C50.111 MALIGNANT NEOPLASM OF CENTRAL PORTION OF BOTH BREASTS IN FEMALE, ESTROGEN RECEPTOR POSITIVE (HCC): ICD-10-CM

## 2018-11-16 PROCEDURE — 0 TECHNETIUM MEDRONATE KIT: Performed by: INTERNAL MEDICINE

## 2018-11-16 PROCEDURE — A9503 TC99M MEDRONATE: HCPCS | Performed by: INTERNAL MEDICINE

## 2018-11-16 PROCEDURE — 78306 BONE IMAGING WHOLE BODY: CPT

## 2018-11-16 RX ORDER — TC 99M MEDRONATE 20 MG/10ML
20 INJECTION, POWDER, LYOPHILIZED, FOR SOLUTION INTRAVENOUS
Status: COMPLETED | OUTPATIENT
Start: 2018-11-16 | End: 2018-11-16

## 2018-11-16 RX ADMIN — Medication 20 MILLICURIE: at 09:18

## 2018-11-28 RX ORDER — IBANDRONATE SODIUM 150 MG/1
150 TABLET, FILM COATED ORAL
Qty: 4 TABLET | Refills: 3 | Status: SHIPPED | OUTPATIENT
Start: 2018-11-28 | End: 2020-02-17 | Stop reason: SDUPTHER

## 2018-12-10 RX ORDER — VENLAFAXINE HYDROCHLORIDE 37.5 MG/1
CAPSULE, EXTENDED RELEASE ORAL
Qty: 30 CAPSULE | Refills: 5 | Status: SHIPPED | OUTPATIENT
Start: 2018-12-10 | End: 2019-06-04 | Stop reason: SDUPTHER

## 2019-02-01 RX ORDER — ANASTROZOLE 1 MG/1
1 TABLET ORAL DAILY
Qty: 30 TABLET | Refills: 5 | Status: SHIPPED | OUTPATIENT
Start: 2019-02-01 | End: 2019-08-27 | Stop reason: SDUPTHER

## 2019-04-15 ENCOUNTER — OFFICE VISIT (OUTPATIENT)
Dept: ONCOLOGY | Facility: CLINIC | Age: 39
End: 2019-04-15

## 2019-04-15 ENCOUNTER — LAB (OUTPATIENT)
Dept: OTHER | Facility: HOSPITAL | Age: 39
End: 2019-04-15

## 2019-04-15 VITALS
TEMPERATURE: 98.5 F | OXYGEN SATURATION: 99 % | SYSTOLIC BLOOD PRESSURE: 99 MMHG | RESPIRATION RATE: 14 BRPM | BODY MASS INDEX: 22.82 KG/M2 | WEIGHT: 113.2 LBS | HEIGHT: 59 IN | DIASTOLIC BLOOD PRESSURE: 66 MMHG | HEART RATE: 78 BPM

## 2019-04-15 DIAGNOSIS — Z17.0 MALIGNANT NEOPLASM OF CENTRAL PORTION OF BOTH BREASTS IN FEMALE, ESTROGEN RECEPTOR POSITIVE (HCC): ICD-10-CM

## 2019-04-15 DIAGNOSIS — C50.111 MALIGNANT NEOPLASM OF CENTRAL PORTION OF BOTH BREASTS IN FEMALE, ESTROGEN RECEPTOR POSITIVE (HCC): ICD-10-CM

## 2019-04-15 DIAGNOSIS — Z79.811 LONG TERM CURRENT USE OF AROMATASE INHIBITOR: ICD-10-CM

## 2019-04-15 DIAGNOSIS — C50.111 MALIGNANT NEOPLASM OF CENTRAL PORTION OF BOTH BREASTS IN FEMALE, ESTROGEN RECEPTOR POSITIVE (HCC): Primary | ICD-10-CM

## 2019-04-15 DIAGNOSIS — C50.112 MALIGNANT NEOPLASM OF CENTRAL PORTION OF BOTH BREASTS IN FEMALE, ESTROGEN RECEPTOR POSITIVE (HCC): Primary | ICD-10-CM

## 2019-04-15 DIAGNOSIS — C50.112 MALIGNANT NEOPLASM OF CENTRAL PORTION OF BOTH BREASTS IN FEMALE, ESTROGEN RECEPTOR POSITIVE (HCC): ICD-10-CM

## 2019-04-15 DIAGNOSIS — Z15.01 BRCA1 GENETIC CARRIER: ICD-10-CM

## 2019-04-15 DIAGNOSIS — Z17.0 MALIGNANT NEOPLASM OF CENTRAL PORTION OF BOTH BREASTS IN FEMALE, ESTROGEN RECEPTOR POSITIVE (HCC): Primary | ICD-10-CM

## 2019-04-15 DIAGNOSIS — Z15.09 BRCA1 GENETIC CARRIER: ICD-10-CM

## 2019-04-15 LAB
ALBUMIN SERPL-MCNC: 3.6 G/DL (ref 3.5–5.2)
ALBUMIN/GLOB SERPL: 1.4 G/DL
ALP SERPL-CCNC: 92 U/L (ref 39–117)
ALT SERPL W P-5'-P-CCNC: 48 U/L (ref 1–33)
ANION GAP SERPL CALCULATED.3IONS-SCNC: 11.3 MMOL/L
AST SERPL-CCNC: 77 U/L (ref 1–32)
BASOPHILS # BLD AUTO: 0.02 10*3/MM3 (ref 0–0.2)
BASOPHILS NFR BLD AUTO: 0.4 % (ref 0–1.5)
BILIRUB SERPL-MCNC: 0.2 MG/DL (ref 0.1–1.2)
BUN BLD-MCNC: 15 MG/DL (ref 6–20)
BUN/CREAT SERPL: 33.3 (ref 7–25)
CALCIUM SPEC-SCNC: 8.4 MG/DL (ref 8.6–10.5)
CHLORIDE SERPL-SCNC: 103 MMOL/L (ref 98–107)
CO2 SERPL-SCNC: 24.7 MMOL/L (ref 22–29)
CREAT BLD-MCNC: 0.45 MG/DL (ref 0.57–1)
DEPRECATED RDW RBC AUTO: 44.6 FL (ref 37–54)
EOSINOPHIL # BLD AUTO: 0.06 10*3/MM3 (ref 0–0.4)
EOSINOPHIL NFR BLD AUTO: 1.2 % (ref 0.3–6.2)
ERYTHROCYTE [DISTWIDTH] IN BLOOD BY AUTOMATED COUNT: 13.7 % (ref 12.3–15.4)
GFR SERPL CREATININE-BSD FRML MDRD: >150 ML/MIN/1.73
GLOBULIN UR ELPH-MCNC: 2.6 GM/DL
GLUCOSE BLD-MCNC: 99 MG/DL (ref 65–99)
HCT VFR BLD AUTO: 38.7 % (ref 34–46.6)
HGB BLD-MCNC: 12.4 G/DL (ref 12–15.9)
IMM GRANULOCYTES # BLD AUTO: 0.02 10*3/MM3 (ref 0–0.05)
IMM GRANULOCYTES NFR BLD AUTO: 0.4 % (ref 0–0.5)
LYMPHOCYTES # BLD AUTO: 1.2 10*3/MM3 (ref 0.7–3.1)
LYMPHOCYTES NFR BLD AUTO: 23.6 % (ref 19.6–45.3)
MCH RBC QN AUTO: 28.5 PG (ref 26.6–33)
MCHC RBC AUTO-ENTMCNC: 32 G/DL (ref 31.5–35.7)
MCV RBC AUTO: 89 FL (ref 79–97)
MONOCYTES # BLD AUTO: 0.65 10*3/MM3 (ref 0.1–0.9)
MONOCYTES NFR BLD AUTO: 12.8 % (ref 5–12)
NEUTROPHILS # BLD AUTO: 3.13 10*3/MM3 (ref 1.4–7)
NEUTROPHILS NFR BLD AUTO: 61.6 % (ref 42.7–76)
NRBC BLD AUTO-RTO: 0 /100 WBC (ref 0–0)
PLATELET # BLD AUTO: 312 10*3/MM3 (ref 140–450)
PMV BLD AUTO: 9.3 FL (ref 6–12)
POTASSIUM BLD-SCNC: 5 MMOL/L (ref 3.5–5.2)
PROT SERPL-MCNC: 6.2 G/DL (ref 6–8.5)
RBC # BLD AUTO: 4.35 10*6/MM3 (ref 3.77–5.28)
SODIUM BLD-SCNC: 139 MMOL/L (ref 136–145)
WBC NRBC COR # BLD: 5.08 10*3/MM3 (ref 3.4–10.8)

## 2019-04-15 PROCEDURE — 85025 COMPLETE CBC W/AUTO DIFF WBC: CPT | Performed by: INTERNAL MEDICINE

## 2019-04-15 PROCEDURE — 99214 OFFICE O/P EST MOD 30 MIN: CPT | Performed by: INTERNAL MEDICINE

## 2019-04-15 PROCEDURE — 80053 COMPREHEN METABOLIC PANEL: CPT | Performed by: INTERNAL MEDICINE

## 2019-04-15 PROCEDURE — 36415 COLL VENOUS BLD VENIPUNCTURE: CPT

## 2019-04-15 NOTE — PROGRESS NOTES
Subjective      EASONS FOR FOLLOWUP:   1. [id number] triple negative right breast cancer.   2. T1Nx left breast cancer invasive ductal carcinoma, ER/MT positive, HER2 negative.   3. BRCA 1 positive.   4. Right internal mammary node positive on CT scan.   5. Neoadjuvant dose dense Adriamycin/Cytoxan, weekly Taxol started on 01/20/2014 with plan for axillary dissection on the right and sentinel node on left, bilateral mastectomies plus radiation to the right breast and internal mammary nodes.   6. Addition of carboplatin with weekly Taxol to start on 03/24/2014.   7. Carboplatin held on 05/05/2014 due to thrombocytopenia.   8. The patient was seen on 08/05/2014, postoperatively; bilateral mastectomies and sentinel nodes with a 5 mm residua l grade 3 tumor in the right breast with two negative sentinel nodes (cannot tell if the node with a clip was removed). Complete response in the left breast with two negative sentinel nodes. The decision was made to consider radiation because of her pret reatment internal mammary node on the right and tamoxifen therapy for the hormone positive breast cancer on right. Clinical trials are being looked into.   9. Additional surgery to remove the lymph node with the clip in the right axilla, 3 nodes removed and negative for metastatic disease. Tamoxifen started September 2014.   10. Radiation completed in October 2014. Tamoxifen on hold because of some neurological signs.   11. Not eligible for NSABP B-55 because of tamoxifen as of 10/20/2014.   12. Tamoxifen resumed on 11/18/2014 on every other day dose.   13. CT scans done for right neck discomfort with negative scans except for radiation scarring in the right lung apex.   14. Post total abdominal hysterectomy and BSO in 01/2015. Tamoxifen resumed with switch to an AI in 7/15  15. Lymphedema, right arm.                   16.  Iron deficiency anemia    History of Present Illness  patient is a 36-year-old white female with BRCA1  positivity and bilateral breast cancers currently on Arimidex for the last 3 years after 1 year of tamoxifen.  Comes in for follow-up and overall she is doing fairly.  She remains anxious but her depression is somewhat better .  She is not sleeping well .  She feels that she is dependent on the Effexor and when he gets close to the 24-hour time, She feels slightly dysphoric until she takes her Effexor.   She continues to have irritable bowel symptoms and now constipation is more problematic.     She continues to have some tenderness in the rib cage below the right implant and is worried about this.  We did a bone scan in November which was benign but her bone density shows osteoporosis and she has been on Boniva more regularly now but I still want to repeat the bone density in November and add Prolia if it is not improved    Liver function tests are mildly elevated today and she has been drinking more wine over the spring break holidays and I suggested we recheck it in 2 months    Active Ambulatory Problems     Diagnosis Date Noted   • Malignant neoplasm of female breast (CMS/HCC) 04/15/2016   • BRCA1 genetic carrier 04/15/2016   • Anemia 2016   • Long term current use of aromatase inhibitor 2016   • Osteopenia 2016     Resolved Ambulatory Problems     Diagnosis Date Noted   • No Resolved Ambulatory Problems     Past Medical History:   Diagnosis Date   • Cancer (CMS/HCC)    • History of radiation therapy    • Lymphedema      Past Surgical History:   Procedure Laterality Date   • BREAST SURGERY Bilateral 2014    Mastectomy   •  SECTION  , 2010    X2   • HYSTERECTOMY  2015    Total         OB/GYN HISTORY: She is  2, para 2. Menarche at age 10. First childbirth wa s at age 26. She breast-fed her second child for 6 weeks. She was on birth control until her first childbirth and then used a Mirena IUD which she just had removed 3 months ago.     HEMATOLOGIC/ONCOLOGIC HISTORY:   The  patient was seen on 08/05/2014, having had bilateral mastectomies. Right breast had a 5 mm residual invasive tumor, grade 3, with clear margins, two negative sentinel nodes (I cannot tell if the lymph node with the clip was remove d and we will have to assess this). Left breast shows a complete pathological response with two negative sentinel nodes. The decision was made to review her for radiation therapy because of enlarged right internal mammary node, pretreatment, and to star t tamoxifen. She is going to have her ovaries removed later in the year when she has expanders put in and we can switch to an AI at that point. I am looking into clinical trials for BCRA positive patients with residual disease and for triple negative canc ers with residual disease but the 5 mm tumor may disqualify her.        The patient was seen on 09/02/2014 with additional surgery to remove the lymph node in her axilla with the clip in it which was thankfully benign in addition to 3 other nodes which were nega tive. Radiation to the internal mammary chain planned plus tamoxifen for 5 years. The NSABP B55 study is expected to have an amendment including hormone positive patients and we will revisit this issue after her radiation. Her port has been removed.        NSA BP amendment has not yet gone through to include patients on tamoxifen, therefore, she is not eligible for NSABP B-55 as of 10/21/2014. Tamoxifen held for 1 month because of some dizzy spells and hysterectomy and oophorectomy planned for early 2015, at wh ich point we will switch to Arimidex.        The patient was seen on 02/25/2014 having CT scans because a preop chest x-ray before hysterectomy showed a shadow in the right apex. CT scans showed what seemed to be radiation change in the apex of the right lung. No abnormality in the neck or abdomen and a residual small seroma in the right axilla. Path reports on her hysterectomy and BSO were benign and because of her  intolerance of tamoxifen, we started her on Effexor 12.5 b.i.d. to be increased as tolerated and t hen to resume tamoxifen every other day for a month and then go to daily if possible. Once she is stable at this, we will try and switch over to an AI if she can tolerate this.        The patient was seen on 04/21/2015 with a CT scan done to followup on changes noted on a previous CT scans, which were felt to be radiation-related. Thankfully, the scan shows diminished consolidation of the pleural surface and a small subpleural air space disease, stable at 8 x 4 mm and images through the upper abdomen and the re s t of the lung were negative. She is up to 37.5 of Effexor with good control of her hot flashes and taking her tamoxifen every other day and we have asked her to increase her tamoxifen to daily and we will see her back in 3 months and if she is tolerating this well, consider switching to an AI at that time.        Patient seen on 07/21/2015, tolerating tamoxifen well at full dose for the last 3 months since her hysterectomy. Plans to switch to Femara in 2-3 months. Nocturnal cough, which I suspect is allergic or reflux related. Consider a steroid inhaler.        7/17 Arimidex since July 2015.  Overall she is tolerating Arimidex well with very few hot flashes because of the Effexor but she is clearly not herself due to the postmenopausal state and has less energy low libido and I think some amount of depression which is not unusual in this situation.  Her mother had osteoporosis and her baseline bone density is osteopenia which is fairly severe and I added Boniva monthly in addition to calcium and vitamin D    .Her neck pain is resolved she is sleeping okay appetite is fair and apart from fatigue which is related to her postmenopausal state she appears to be doing okay.  She is a little more irritable than usual but realizes this is part of the postmenopausal state  Complains of some irritable bowel type  symptoms with constipation alternating with diarrhea and she is a little more anemic than usual and her ferritin was low and she was given oral iron with good improvement in her hemoglobin.  I'm reluctant to add another test with colonoscopy for at this point and we will wait and see how she does off iron and if her hemoglobin drops and her iron is low again the colonoscopy and EGD will be scheduled .  Her lymphedema is a little better this summer    She continues to have discomfort over the right chest wall where she received radiation and does not feel that the implant is his flexible and stiffer and because of the radiation fibrosis on CAT scan has been concerned about issues here.    Her liver function tests have been barely up on and off and therefore we will do another ultrasound to evaluate the liver and gallbladder.        SOCIAL HISTORY: She is , lives with her . She is school psychologist. She does not smoke. She drinks very minimally. No trouble with drug addiction.    FAMILY HISTORY: Parents are in their late 50s and are healthy. She has no siblings. She has a paternal aunt with breast cancer in 40s, maternal grandmother with breast cancer at age 58,   and both of the grandmother' s sisters had breast cancer in their 50s. She has a maternal uncle with prostate cancer at age 54. There is no ovarian, uterine or pancreatic cancer in the family that she is aware of. BRCA 1 positive    Review of Systems   Constitutional: Positive for fatigue (no changes 4/15/19).   HENT: Negative.    Respiratory: Negative.    Cardiovascular: Negative.    Gastrointestinal: Positive for constipation (constipation and diarrhea same 4/15/19) and diarrhea (no change 4/15/19).   Endocrine: Positive for heat intolerance (no change 4/15/19).   Genitourinary: Negative.    Neurological: Negative.    Psychiatric/Behavioral: Positive for decreased concentration (no change 4/15/19), dysphoric mood (little better 4/15/19) and  "sleep disturbance (getting better 4/15/19). The patient is not nervous/anxious (better 4/15/19).    All other systems reviewed and are negative.     A comprehensive 14 point review of systems was performed and was negative except as mentioned.    Medications:  The current medication list was reviewed in the EMR    ALLERGIES:    Allergies   Allergen Reactions   • No Known Drug Allergy        Objective      Vitals:    04/15/19 0835   BP: 99/66   Pulse: 78   Resp: 14   Temp: 98.5 °F (36.9 °C)   SpO2: 99%   Weight: 51.3 kg (113 lb 3.2 oz)   Height: 151 cm (59.45\")   PainSc: 0-No pain     Current Status 4/15/2019   ECOG score 0       Physical Exam   Pulmonary/Chest:           GENERAL:  Well-developed, well-nourished in no acute distress.   SKIN:  Warm, dry without rashes, purpura or petechiae.  HEAD:  Normocephalic.  EYES:  Pupils equal, round and reactive to light.  EOMs intact.  Conjunctivae normal.  EARS:  Hearing intact.  NOSE:  Septum midline.  No excoriations or nasal discharge.  MOUTH:  Tongue is well-papillated; no stomatitis or ulcers.  Lips normal.  THROAT:  Oropharynx without lesions or exudates.  NECK:  Supple with good range of motion; no thyromegaly or masses, no JVD.  LYMPHATICS:  No cervical, supraclavicular, axillary or inguinal adenopathy.  CHEST:  Lungs clear to percussion and auscultation. Good airflow.  BREASTS: Bilateral implants benign there is no axillary adenopathy.Tenderness in the subcostal and intramammary rib cage right greater than left -point tenderness in the costochondral junction below the right breast-is a little prominence to 1 of her ribs lateral to the implant but  this is chronic- see diagram  CARDIAC:  Regular rate and rhythm without murmurs, rubs or gallops. Normal S1,S2.  ABDOMEN:  Soft, nontender with no organomegaly or masses.  EXTREMITIES:  No clubbing, cyanosis or edema.  NEUROLOGICAL:  Cranial Nerves II-XII grossly intact.  No focal neurological deficits.  PSYCHIATRIC:  " Normal affect and mood.    Exam unchanged    RECENT LABS:  Hematology WBC   Date Value Ref Range Status   04/15/2019 5.08 3.40 - 10.80 10*3/mm3 Final     RBC   Date Value Ref Range Status   04/15/2019 4.35 3.77 - 5.28 10*6/mm3 Final     Hemoglobin   Date Value Ref Range Status   04/15/2019 12.4 12.0 - 15.9 g/dL Final     Hematocrit   Date Value Ref Range Status   04/15/2019 38.7 34.0 - 46.6 % Final     Platelets   Date Value Ref Range Status   04/15/2019 312 140 - 450 10*3/mm3 Final      FINDINGS: Sonographic evaluation of the liver and selective structures  of the right upper quadrant was performed. The right kidney has a normal  appearance. No abnormality of the liver is appreciated. The liver  measures on the order of 17.5 cm in anterior to posterior dimension. The  gallbladder has a normal appearance. The common bile duct measures 0.4  cm in diameter. Per the sonographer, the patient was nontender in the  right upper quadrant during the examination. Visualized portion of the  pancreas appears normal.      IMPRESSION:  Negative liver sonogram.      This report was finalized on 4/14/2017       NUCLEAR MEDICINE WHOLE BODY BONE SCAN     IMPRESSION:  No scintigraphic evidence for bony metastatic disease.     This report was finalized on 11/17/2018         Assessment/Plan   1.[id number] triple negative right breast cancer. [id number] dose dense Adriamycin Cytoxan and weekly carbotaxol-on tamoxifen-switch to Arimidex after oophorectomy in July 2015  2.T1Nx left breast cancer invasive ductal carcinoma, ER/NM positive, HER2 negative. [id number]                  3. BRCA 1 positive. Post hysterectomy and oophorectomy                   4.  Lymphedema right arm improved with a sleeve                  5.  Mild depression declines any treatment for this now                  6.  Mild anemia due to iron deficiency responding to oral iron                  7.  Osteopenia moderately severe-declined Boniva  was started                    8.  Mildly elevated LFTs with negative liver and pancreas ultrasound       Plan  1.Continue Arimidex and Effexor  2. . Repeat LFTs in 2 months  3.  See me in 6 months  Check Ca1 25 annually4.               4/15/2019      CC:

## 2019-04-15 NOTE — PROGRESS NOTES
Subjective      EASONS FOR FOLLOWUP:   1. [id number] triple negative right breast cancer.   2. T1Nx left breast cancer invasive ductal carcinoma, ER/[place] positive, HER2 negative.   3. BRCA 1 positive.   4. Right internal mammary node positive on CT scan.   5. Neoadjuvant dose dense Adriamycin/Cytoxan, weekly Taxol started on 01/20/2014 with plan for axillary dissection on the right and sentinel node on left, bilateral mastectomies plus radiation to the right breast and internal mammary nodes.   6. Addition of carboplatin with weekly Taxol to start on 03/24/2014.   7. Carboplatin held on 05/05/2014 due to thrombocytopenia.   8. The patient was seen on 08/05/2014, postoperatively; bilateral mastectomies and sentinel nodes with a 5 mm residua l grade 3 tumor in the right breast with two negative sentinel nodes (cannot tell if the node with a clip was removed). Complete response in the left breast with two negative sentinel nodes. The decision was made to consider radiation because of her pret reatment internal mammary node on the right and tamoxifen therapy for the hormone positive breast cancer on right. Clinical trials are being looked into.   9. Additional surgery to remove the lymph node with the clip in the right axilla, 3 nodes removed and negative for metastatic disease. Tamoxifen started September 2014.   10. Radiation completed in October 2014. Tamoxifen on hold because of some neurological signs.   11. Not eligible for NSABP B-55 because of tamoxifen as of 10/20/2014.   12. Tamoxifen resumed on 11/18/2014 on every other day dose.   13. CT scans done for right neck discomfort with negative scans except for radiation scarring in the right lung apex.   14. Post total abdominal hysterectomy and BSO in 01/2015. Tamoxifen resumed with switch to an AI in 7/15  15. Lymphedema, right arm.                   16.  Iron deficiency anemia    History of Present Illness  patient is a 36-year-old white female with BRCA1  positivity and bilateral breast cancers currently on Arimidex for the last 3 years after 1 year of tamoxifen.  Comes in for follow-up and overall she is doing fairly.  She remains anxious but her depression is somewhat better .  She is not sleeping well .  She feels that she is dependent on the Effexor and when he gets close to the 24-hour time, She feels slightly dysphoric until she takes her Effexor.   She continues to have irritable bowel symptoms and now constipation is more problematic.     She continues to have some tenderness in the rib cage below the right implant and is worried about this.  We did a bone scan in November which was benign but her bone density shows osteoporosis and she has been on Boniva more regularly now but I still want to repeat the bone density in November and add Prolia if it is not improved    Liver function tests are mildly elevated today and she has been drinking more wine over the spring break holidays and I suggested we recheck it in 2 months    Active Ambulatory Problems     Diagnosis Date Noted   • Malignant neoplasm of female breast (CMS/HCC) 04/15/2016   • BRCA1 genetic carrier 04/15/2016   • Anemia 2016   • Long term current use of aromatase inhibitor 2016   • Osteopenia 2016     Resolved Ambulatory Problems     Diagnosis Date Noted   • No Resolved Ambulatory Problems     Past Medical History:   Diagnosis Date   • Cancer (CMS/HCC)    • History of radiation therapy    • Lymphedema      Past Surgical History:   Procedure Laterality Date   • BREAST SURGERY Bilateral 2014    Mastectomy   •  SECTION  , 2010    X2   • HYSTERECTOMY  2015    Total         OB/GYN HISTORY: She is  2, para 2. Menarche at age 10. First childbirth wa s at age 26. She breast-fed her second child for 6 weeks. She was on birth control until her first childbirth and then used a Mirena IUD which she just had removed 3 months ago.     HEMATOLOGIC/ONCOLOGIC HISTORY:   The  patient was seen on 08/05/2014, having had bilateral mastectomies. Right breast had a 5 mm residual invasive tumor, grade 3, with clear margins, two negative sentinel nodes (I cannot tell if the lymph node with the clip was remove d and we will have to assess this). Left breast shows a complete pathological response with two negative sentinel nodes. The decision was made to review her for radiation therapy because of enlarged right internal mammary node, pretreatment, and to star t tamoxifen. She is going to have her ovaries removed later in the year when she has expanders put in and we can switch to an AI at that point. I am looking into clinical trials for BCRA positive patients with residual disease and for triple negative canc ers with residual disease but the 5 mm tumor may disqualify her.        The patient was seen on 09/02/2014 with additional surgery to remove the lymph node in her axilla with the clip in it which was thankfully benign in addition to 3 other nodes which were nega tive. Radiation to the internal mammary chain planned plus tamoxifen for 5 years. The NSABP B55 study is expected to have an amendment including hormone positive patients and we will revisit this issue after her radiation. Her port has been removed.        NSA BP amendment has not yet gone through to include patients on tamoxifen, therefore, she is not eligible for NSABP B-55 as of 10/21/2014. Tamoxifen held for 1 month because of some dizzy spells and hysterectomy and oophorectomy planned for early 2015, at wh ich point we will switch to Arimidex.        The patient was seen on 02/25/2014 having CT scans because a preop chest x-ray before hysterectomy showed a shadow in the right apex. CT scans showed what seemed to be radiation change in the apex of the right lung. No abnormality in the neck or abdomen and a residual small seroma in the right axilla. Path reports on her hysterectomy and BSO were benign and because of her  intolerance of tamoxifen, we started her on Effexor 12.5 b.i.d. to be increased as tolerated and t hen to resume tamoxifen every other day for a month and then go to daily if possible. Once she is stable at this, we will try and switch over to an AI if she can tolerate this.        The patient was seen on 04/21/2015 with a CT scan done to followup on changes noted on a previous CT scans, which were felt to be radiation-related. Thankfully, the scan shows diminished consolidation of the pleural surface and a small subpleural air space disease, stable at 8 x 4 mm and images through the upper abdomen and the re s t of the lung were negative. She is up to 37.5 of Effexor with good control of her hot flashes and taking her tamoxifen every other day and we have asked her to increase her tamoxifen to daily and we will see her back in 3 months and if she is tolerating this well, consider switching to an AI at that time.        Patient seen on 07/21/2015, tolerating tamoxifen well at full dose for the last 3 months since her hysterectomy. Plans to switch to Femara in 2-3 months. Nocturnal cough, which I suspect is allergic or reflux related. Consider a steroid inhaler.        7/17 Arimidex since July 2015.  Overall she is tolerating Arimidex well with very few hot flashes because of the Effexor but she is clearly not herself due to the postmenopausal state and has less energy low libido and I think some amount of depression which is not unusual in this situation.  Her mother had osteoporosis and her baseline bone density is osteopenia which is fairly severe and I added Boniva monthly in addition to calcium and vitamin D    .Her neck pain is resolved she is sleeping okay appetite is fair and apart from fatigue which is related to her postmenopausal state she appears to be doing okay.  She is a little more irritable than usual but realizes this is part of the postmenopausal state  Complains of some irritable bowel type  symptoms with constipation alternating with diarrhea and she is a little more anemic than usual and her ferritin was low and she was given oral iron with good improvement in her hemoglobin.  I'm reluctant to add another test with colonoscopy for at this point and we will wait and see how she does off iron and if her hemoglobin drops and her iron is low again the colonoscopy and EGD will be scheduled .  Her lymphedema is a little better this summer    She continues to have discomfort over the right chest wall where she received radiation and does not feel that the implant is his flexible and stiffer and because of the radiation fibrosis on CAT scan has been concerned about issues here.    Her liver function tests have been barely up on and off and therefore we will do another ultrasound to evaluate the liver and gallbladder.        SOCIAL HISTORY: She is , lives with her . She is school psychologist. She does not smoke. She drinks very minimally. No trouble with drug addiction.    FAMILY HISTORY: Parents are in their late 50s and are healthy. She has no siblings. She has a paternal aunt with breast cancer in 40s, maternal grandmother with breast cancer at age 58,   and both of the grandmother' s sisters had breast cancer in their 50s. She has a maternal uncle with prostate cancer at age 54. There is no ovarian, uterine or pancreatic cancer in the family that she is aware of. BRCA 1 positive    Review of Systems   Constitutional: Positive for fatigue (no changes 4/15/19).   HENT: Negative.    Respiratory: Negative.    Cardiovascular: Negative.    Gastrointestinal: Positive for constipation (constipation and diarrhea same 4/15/19) and diarrhea (no change 4/15/19).   Endocrine: Positive for heat intolerance (no change 4/15/19).   Genitourinary: Negative.    Neurological: Negative.    Psychiatric/Behavioral: Positive for decreased concentration (no change 4/15/19), dysphoric mood (little better 4/15/19) and  "sleep disturbance (getting better 4/15/19). The patient is not nervous/anxious (better 4/15/19).    All other systems reviewed and are negative.     A comprehensive 14 point review of systems was performed and was negative except as mentioned.    Medications:  The current medication list was reviewed in the EMR    ALLERGIES:    Allergies   Allergen Reactions   • No Known Drug Allergy        Objective      Vitals:    04/15/19 0835   BP: 99/66   Pulse: 78   Resp: 14   Temp: 98.5 °F (36.9 °C)   SpO2: 99%   Weight: 51.3 kg (113 lb 3.2 oz)   Height: 151 cm (59.45\")   PainSc: 0-No pain     Current Status 4/15/2019   ECOG score 0       Physical Exam   Pulmonary/Chest:           GENERAL:  Well-developed, well-nourished in no acute distress.   SKIN:  Warm, dry without rashes, purpura or petechiae.  HEAD:  Normocephalic.  EYES:  Pupils equal, round and reactive to light.  EOMs intact.  Conjunctivae normal.  EARS:  Hearing intact.  NOSE:  Septum midline.  No excoriations or nasal discharge.  MOUTH:  Tongue is well-papillated; no stomatitis or ulcers.  Lips normal.  THROAT:  Oropharynx without lesions or exudates.  NECK:  Supple with good range of motion; no thyromegaly or masses, no JVD.  LYMPHATICS:  No cervical, supraclavicular, axillary or inguinal adenopathy.  CHEST:  Lungs clear to percussion and auscultation. Good airflow.  BREASTS: Bilateral implants benign there is no axillary adenopathy.Tenderness in the subcostal and intramammary rib cage right greater than left -point tenderness in the costochondral junction below the right breast-is a little prominence to 1 of her ribs lateral to the implant but  this is chronic- see diagram  CARDIAC:  Regular rate and rhythm without murmurs, rubs or gallops. Normal S1,S2.  ABDOMEN:  Soft, nontender with no organomegaly or masses.  EXTREMITIES:  No clubbing, cyanosis or edema.  NEUROLOGICAL:  Cranial Nerves II-XII grossly intact.  No focal neurological deficits.  PSYCHIATRIC:  " Normal affect and mood.    Exam unchanged    RECENT LABS:  Hematology WBC   Date Value Ref Range Status   04/15/2019 5.08 3.40 - 10.80 10*3/mm3 Final     RBC   Date Value Ref Range Status   04/15/2019 4.35 3.77 - 5.28 10*6/mm3 Final     Hemoglobin   Date Value Ref Range Status   04/15/2019 12.4 12.0 - 15.9 g/dL Final     Hematocrit   Date Value Ref Range Status   04/15/2019 38.7 34.0 - 46.6 % Final     Platelets   Date Value Ref Range Status   04/15/2019 312 140 - 450 10*3/mm3 Final      FINDINGS: Sonographic evaluation of the liver and selective structures  of the right upper quadrant was performed. The right kidney has a normal  appearance. No abnormality of the liver is appreciated. The liver  measures on the order of 17.5 cm in anterior to posterior dimension. The  gallbladder has a normal appearance. The common bile duct measures 0.4  cm in diameter. Per the sonographer, the patient was nontender in the  right upper quadrant during the examination. Visualized portion of the  pancreas appears normal.      IMPRESSION:  Negative liver sonogram.      This report was finalized on 4/14/2017       NUCLEAR MEDICINE WHOLE BODY BONE SCAN     IMPRESSION:  No scintigraphic evidence for bony metastatic disease.     This report was finalized on 11/17/2018         Assessment/Plan   1.[id number] triple negative right breast cancer. [id number] dose dense Adriamycin Cytoxan and weekly carbotaxol-on tamoxifen-switch to Arimidex after oophorectomy in July 2015  2.T1Nx left breast cancer invasive ductal carcinoma, ER/[place] positive, HER2 negative. [id number]                  3. BRCA 1 positive. Post hysterectomy and oophorectomy                   4.  Lymphedema right arm improved with a sleeve                  5.  Mild depression declines any treatment for this now                  6.  Mild anemia due to iron deficiency responding to oral iron                  7.  Osteopenia moderately severe-declined Boniva  was started                    8.  Mildly elevated LFTs with negative liver and pancreas ultrasound       Plan  1.Continue Arimidex and Effexor  2. . Repeat LFTs in 2 months  3.  See me in 6 months  Check Ca1 25 annually4.               4/15/2019      CC:

## 2019-06-04 RX ORDER — VENLAFAXINE HYDROCHLORIDE 37.5 MG/1
CAPSULE, EXTENDED RELEASE ORAL
Qty: 30 CAPSULE | Refills: 5 | Status: SHIPPED | OUTPATIENT
Start: 2019-06-04 | End: 2019-12-03 | Stop reason: SDUPTHER

## 2019-06-10 ENCOUNTER — APPOINTMENT (OUTPATIENT)
Dept: ONCOLOGY | Facility: HOSPITAL | Age: 39
End: 2019-06-10

## 2019-06-10 ENCOUNTER — APPOINTMENT (OUTPATIENT)
Dept: OTHER | Facility: HOSPITAL | Age: 39
End: 2019-06-10

## 2019-07-31 ENCOUNTER — TELEPHONE (OUTPATIENT)
Dept: ONCOLOGY | Facility: HOSPITAL | Age: 39
End: 2019-07-31

## 2019-07-31 ENCOUNTER — TRANSCRIBE ORDERS (OUTPATIENT)
Dept: ADMINISTRATIVE | Facility: HOSPITAL | Age: 39
End: 2019-07-31

## 2019-07-31 ENCOUNTER — LAB (OUTPATIENT)
Dept: LAB | Facility: HOSPITAL | Age: 39
End: 2019-07-31

## 2019-07-31 ENCOUNTER — APPOINTMENT (OUTPATIENT)
Dept: GENERAL RADIOLOGY | Facility: HOSPITAL | Age: 39
End: 2019-07-31

## 2019-07-31 DIAGNOSIS — R19.7 DIARRHEA, UNSPECIFIED TYPE: Primary | ICD-10-CM

## 2019-07-31 DIAGNOSIS — R19.7 DIARRHEA, UNSPECIFIED TYPE: ICD-10-CM

## 2019-07-31 LAB
ADV 40+41 DNA STL QL NAA+NON-PROBE: NOT DETECTED
ALBUMIN SERPL-MCNC: 3 G/DL (ref 3.5–5.2)
ALBUMIN/GLOB SERPL: 1.4 G/DL
ALP SERPL-CCNC: 75 U/L (ref 39–117)
ALT SERPL W P-5'-P-CCNC: 50 U/L (ref 1–33)
ANION GAP SERPL CALCULATED.3IONS-SCNC: 10.2 MMOL/L (ref 5–15)
AST SERPL-CCNC: 50 U/L (ref 1–32)
ASTRO TYP 1-8 RNA STL QL NAA+NON-PROBE: NOT DETECTED
BILIRUB SERPL-MCNC: 0.2 MG/DL (ref 0.1–1.2)
BUN BLD-MCNC: 13 MG/DL (ref 6–20)
BUN/CREAT SERPL: 19.1 (ref 7–25)
C CAYETANENSIS DNA STL QL NAA+NON-PROBE: NOT DETECTED
CALCIUM SPEC-SCNC: 8.4 MG/DL (ref 8.6–10.5)
CAMPY SP DNA.DIARRHEA STL QL NAA+PROBE: NOT DETECTED
CHLORIDE SERPL-SCNC: 110 MMOL/L (ref 98–107)
CO2 SERPL-SCNC: 20.8 MMOL/L (ref 22–29)
CREAT BLD-MCNC: 0.68 MG/DL (ref 0.57–1)
CRYPTOSP STL CULT: NOT DETECTED
DEPRECATED RDW RBC AUTO: 43.1 FL (ref 37–54)
E COLI DNA SPEC QL NAA+PROBE: NOT DETECTED
E HISTOLYT AG STL-ACNC: NOT DETECTED
EAEC PAA PLAS AGGR+AATA ST NAA+NON-PRB: NOT DETECTED
EC STX1 + STX2 GENES STL NAA+PROBE: NOT DETECTED
EPEC EAE GENE STL QL NAA+NON-PROBE: NOT DETECTED
ERYTHROCYTE [DISTWIDTH] IN BLOOD BY AUTOMATED COUNT: 15.2 % (ref 12.3–15.4)
ETEC LTA+ST1A+ST1B TOX ST NAA+NON-PROBE: NOT DETECTED
G LAMBLIA DNA SPEC QL NAA+PROBE: NOT DETECTED
GFR SERPL CREATININE-BSD FRML MDRD: 96 ML/MIN/1.73
GLOBULIN UR ELPH-MCNC: 2.1 GM/DL
GLUCOSE BLD-MCNC: 97 MG/DL (ref 65–99)
HCT VFR BLD AUTO: 43.8 % (ref 34–46.6)
HGB BLD-MCNC: 14.7 G/DL (ref 12–15.9)
MCH RBC QN AUTO: 27 PG (ref 26.6–33)
MCHC RBC AUTO-ENTMCNC: 33.6 G/DL (ref 31.5–35.7)
MCV RBC AUTO: 80.4 FL (ref 79–97)
NOROVIRUS GI+II RNA STL QL NAA+NON-PROBE: NOT DETECTED
P SHIGELLOIDES DNA STL QL NAA+PROBE: NOT DETECTED
PLATELET # BLD AUTO: 367 10*3/MM3 (ref 140–450)
PMV BLD AUTO: 9.5 FL (ref 6–12)
POTASSIUM BLD-SCNC: 3.3 MMOL/L (ref 3.5–5.2)
PROT SERPL-MCNC: 5.1 G/DL (ref 6–8.5)
RBC # BLD AUTO: 5.45 10*6/MM3 (ref 3.77–5.28)
RV RNA STL NAA+PROBE: NOT DETECTED
SALMONELLA DNA SPEC QL NAA+PROBE: NOT DETECTED
SAPO I+II+IV+V RNA STL QL NAA+NON-PROBE: NOT DETECTED
SHIGELLA SP+EIEC IPAH STL QL NAA+PROBE: NOT DETECTED
SODIUM BLD-SCNC: 141 MMOL/L (ref 136–145)
V CHOLERAE DNA SPEC QL NAA+PROBE: NOT DETECTED
VIBRIO DNA SPEC NAA+PROBE: NOT DETECTED
WBC NRBC COR # BLD: 6.92 10*3/MM3 (ref 3.4–10.8)
YERSINIA STL CULT: NOT DETECTED

## 2019-07-31 PROCEDURE — 74018 RADEX ABDOMEN 1 VIEW: CPT | Performed by: EMERGENCY MEDICINE

## 2019-07-31 PROCEDURE — 36415 COLL VENOUS BLD VENIPUNCTURE: CPT

## 2019-07-31 PROCEDURE — 80053 COMPREHEN METABOLIC PANEL: CPT

## 2019-07-31 PROCEDURE — 0097U HC BIOFIRE FILMARRAY GI PANEL: CPT | Performed by: EMERGENCY MEDICINE

## 2019-07-31 PROCEDURE — 85027 COMPLETE CBC AUTOMATED: CPT

## 2019-07-31 NOTE — TELEPHONE ENCOUNTER
Pt has been off treatment for 5 years but has yet to get a PCP.  She thought maybe she was having diarrhea from the arimidex.  Called over the weekend and Dr. Sidhu had her hold her arimidex but symptoms are not better.  Has had diarrhea for 2 weeks.  Has been on immodium for about 2 weeks.  Recently traveled for vacation to [place].  Reviewed with Dr. Moreno, pt should go to an [facility] care center.  Gave pt phone number for PCP liason.   She did not seem happy about getting a PCP.      ----- Message from David Wagner sent at 7/31/2019 10:09 AM EDT -----  Contact: [phone]  Pt has diarrhea and she feels bad

## 2019-08-01 ENCOUNTER — TELEPHONE (OUTPATIENT)
Dept: ONCOLOGY | Facility: HOSPITAL | Age: 39
End: 2019-08-01

## 2019-08-01 DIAGNOSIS — R19.7 DIARRHEA, UNSPECIFIED TYPE: ICD-10-CM

## 2019-08-01 DIAGNOSIS — C50.111 MALIGNANT NEOPLASM OF CENTRAL PORTION OF BOTH BREASTS IN FEMALE, ESTROGEN RECEPTOR POSITIVE (HCC): Primary | ICD-10-CM

## 2019-08-01 DIAGNOSIS — C50.112 MALIGNANT NEOPLASM OF CENTRAL PORTION OF BOTH BREASTS IN FEMALE, ESTROGEN RECEPTOR POSITIVE (HCC): Primary | ICD-10-CM

## 2019-08-01 DIAGNOSIS — Z17.0 MALIGNANT NEOPLASM OF CENTRAL PORTION OF BOTH BREASTS IN FEMALE, ESTROGEN RECEPTOR POSITIVE (HCC): Primary | ICD-10-CM

## 2019-08-01 RX ORDER — POTASSIUM CHLORIDE 1500 MG/1
20 TABLET, FILM COATED, EXTENDED RELEASE ORAL DAILY
Qty: 30 TABLET | Refills: 0 | Status: SHIPPED | OUTPATIENT
Start: 2019-08-01 | End: 2019-11-05 | Stop reason: HOSPADM

## 2019-08-01 RX ORDER — POTASSIUM CHLORIDE 1500 MG/1
20 TABLET, FILM COATED, EXTENDED RELEASE ORAL DAILY
Qty: 30 TABLET | Refills: 0 | Status: SHIPPED | OUTPATIENT
Start: 2019-08-01 | End: 2019-08-01

## 2019-08-01 NOTE — TELEPHONE ENCOUNTER
"----- Message from Alice Subramanian sent at 8/1/2019  1:22 PM EDT -----  Contact: [phone]  Pt is calling for lab results    Pt called asking for lab results from her trip to immediate care yesterday. Reviewed CBC, CMP and GI panel. Pt states she started with diarrhea 2 weeks ago, took imodium with no relief. She states she ate bland foods, tried the BRAT diet, no help. At this point she states its been two weeks, she is weak and tired. Today she states she has had 3 liquid stools. She has taking 3-4 doses of lomotil since prescribed yesterday with no relief. Her stomach is not tender, distended and doesn't ache but states it is constantly \"rumbling.\" she is doing her best to drink plenty of water    Reviewed with Dr. Moreno. Order for 20MEQ potassium daily. Mag check on Tuesday (review with Dr. Moreno). GI consult, either Dr. Hernandez or Alec.    Orders placed. Informed pt. Called in script. Message sent to scheduling  "

## 2019-08-05 ENCOUNTER — CLINICAL SUPPORT (OUTPATIENT)
Dept: ONCOLOGY | Facility: HOSPITAL | Age: 39
End: 2019-08-05

## 2019-08-05 ENCOUNTER — LAB (OUTPATIENT)
Dept: OTHER | Facility: HOSPITAL | Age: 39
End: 2019-08-05

## 2019-08-05 VITALS
WEIGHT: 100.4 LBS | HEART RATE: 85 BPM | OXYGEN SATURATION: 99 % | DIASTOLIC BLOOD PRESSURE: 71 MMHG | SYSTOLIC BLOOD PRESSURE: 101 MMHG | BODY MASS INDEX: 19.97 KG/M2 | TEMPERATURE: 98 F

## 2019-08-05 DIAGNOSIS — R19.7 DIARRHEA, UNSPECIFIED TYPE: ICD-10-CM

## 2019-08-05 DIAGNOSIS — C50.111 MALIGNANT NEOPLASM OF CENTRAL PORTION OF BOTH BREASTS IN FEMALE, ESTROGEN RECEPTOR POSITIVE (HCC): Primary | ICD-10-CM

## 2019-08-05 DIAGNOSIS — Z17.0 MALIGNANT NEOPLASM OF CENTRAL PORTION OF BOTH BREASTS IN FEMALE, ESTROGEN RECEPTOR POSITIVE (HCC): Primary | ICD-10-CM

## 2019-08-05 DIAGNOSIS — C50.112 MALIGNANT NEOPLASM OF CENTRAL PORTION OF BOTH BREASTS IN FEMALE, ESTROGEN RECEPTOR POSITIVE (HCC): Primary | ICD-10-CM

## 2019-08-05 LAB
ANION GAP SERPL CALCULATED.3IONS-SCNC: 7.3 MMOL/L (ref 5–15)
BUN BLD-MCNC: 6 MG/DL (ref 6–20)
BUN/CREAT SERPL: 11.1 (ref 7–25)
CALCIUM SPEC-SCNC: 8.4 MG/DL (ref 8.6–10.5)
CHLORIDE SERPL-SCNC: 111 MMOL/L (ref 98–107)
CO2 SERPL-SCNC: 20.7 MMOL/L (ref 22–29)
CREAT BLD-MCNC: 0.54 MG/DL (ref 0.57–1)
GFR SERPL CREATININE-BSD FRML MDRD: 126 ML/MIN/1.73
GLUCOSE BLD-MCNC: 77 MG/DL (ref 65–99)
MAGNESIUM SERPL-MCNC: 1.8 MG/DL (ref 1.6–2.6)
POTASSIUM BLD-SCNC: 3.4 MMOL/L (ref 3.5–5.2)
SODIUM BLD-SCNC: 139 MMOL/L (ref 136–145)

## 2019-08-05 PROCEDURE — 83735 ASSAY OF MAGNESIUM: CPT | Performed by: INTERNAL MEDICINE

## 2019-08-05 PROCEDURE — 96366 THER/PROPH/DIAG IV INF ADDON: CPT | Performed by: INTERNAL MEDICINE

## 2019-08-05 PROCEDURE — 25010000003 POTASSIUM CHLORIDE 10 MEQ/100ML SOLUTION: Performed by: INTERNAL MEDICINE

## 2019-08-05 PROCEDURE — 96361 HYDRATE IV INFUSION ADD-ON: CPT | Performed by: INTERNAL MEDICINE

## 2019-08-05 PROCEDURE — 96365 THER/PROPH/DIAG IV INF INIT: CPT | Performed by: INTERNAL MEDICINE

## 2019-08-05 PROCEDURE — 80048 BASIC METABOLIC PNL TOTAL CA: CPT | Performed by: INTERNAL MEDICINE

## 2019-08-05 PROCEDURE — 36415 COLL VENOUS BLD VENIPUNCTURE: CPT

## 2019-08-05 RX ORDER — POTASSIUM CHLORIDE 7.45 MG/ML
20 INJECTION INTRAVENOUS ONCE
Status: COMPLETED | OUTPATIENT
Start: 2019-08-05 | End: 2019-08-05

## 2019-08-05 RX ORDER — SODIUM CHLORIDE 9 MG/ML
500 INJECTION, SOLUTION INTRAVENOUS ONCE
Status: COMPLETED | OUTPATIENT
Start: 2019-08-05 | End: 2019-08-05

## 2019-08-05 RX ADMIN — SODIUM CHLORIDE 500 ML: 900 INJECTION, SOLUTION INTRAVENOUS at 10:44

## 2019-08-05 RX ADMIN — POTASSIUM CHLORIDE 20 MEQ: 7.46 INJECTION, SOLUTION INTRAVENOUS at 11:39

## 2019-08-05 NOTE — PROGRESS NOTES
Reviewed with Dr Moreno patient's complaints and more labs ordered and ivf started 500 cc to be given today.

## 2019-08-06 ENCOUNTER — OFFICE VISIT (OUTPATIENT)
Dept: GASTROENTEROLOGY | Facility: CLINIC | Age: 39
End: 2019-08-06

## 2019-08-06 VITALS
DIASTOLIC BLOOD PRESSURE: 66 MMHG | TEMPERATURE: 97.7 F | SYSTOLIC BLOOD PRESSURE: 96 MMHG | HEIGHT: 60 IN | BODY MASS INDEX: 21.48 KG/M2 | WEIGHT: 109.4 LBS

## 2019-08-06 DIAGNOSIS — R74.01 ELEVATED TRANSAMINASE LEVEL: ICD-10-CM

## 2019-08-06 DIAGNOSIS — R19.7 DIARRHEA OF PRESUMED INFECTIOUS ORIGIN: Primary | ICD-10-CM

## 2019-08-06 PROCEDURE — 99204 OFFICE O/P NEW MOD 45 MIN: CPT | Performed by: INTERNAL MEDICINE

## 2019-08-06 RX ORDER — SODIUM CHLORIDE, SODIUM LACTATE, POTASSIUM CHLORIDE, CALCIUM CHLORIDE 600; 310; 30; 20 MG/100ML; MG/100ML; MG/100ML; MG/100ML
30 INJECTION, SOLUTION INTRAVENOUS CONTINUOUS
Status: CANCELLED | OUTPATIENT
Start: 2019-08-12

## 2019-08-06 RX ORDER — MONTELUKAST SODIUM 4 MG/1
3 TABLET, CHEWABLE ORAL DAILY
Qty: 160 TABLET | Refills: 1 | Status: SHIPPED | OUTPATIENT
Start: 2019-08-06 | End: 2019-11-11

## 2019-08-06 NOTE — PROGRESS NOTES
Chief Complaint   Patient presents with   • Diarrhea       Subjective     HPI    Sravani Sky is a 39 y.o. female with a past medical history noted below who presents for evaluation of diarrhea.  Symptoms started about 3 weeks ago.  No infectious, dietary, or medication precipitants.  Describing 6-8 BMs per day.  Timing is daily.  Prior to this she would average about 1 BM per day.  Occasional nocturnal stools.  No blood in her stool. Lots of borborygmi.  No cramping or pain.  Stools are purely liquid.  She was prescribed lomotil at an urgent care center but noticed no change.  Tried imodium, no change.  She has had some weight loss with the symptoms.  Overall feels drained from the symptoms.  GI PCR panel 7/31 was normal.      Review of her labs show that she has had a chronic elevation in her AST and ALT, a bit higher on check of labs on 7/31    Reports seeing her oncologist recently who gave her some IV fluids which she said made her feel better.    No herbal supplements, artificial sugars     Hx of breast cancer in remission, diagnosed 5 years ago    Complete hysterectomy    No family history of IBD, no polyps or GI malignancy    Works as a school psychologist    Has never had any endoscopy.        Past Medical History:   Diagnosis Date   • Cancer (CMS/HCC)     Bilateral breast cancer; diagnosed 12/2013   • History of radiation therapy    • Lymphedema     Right hand and wrist         Current Outpatient Medications:   •  ibandronate (BONIVA) 150 MG tablet, Take 1 tablet by mouth Every 30 (Thirty) Days., Disp: 4 tablet, Rfl: 3  •  potassium chloride (K-DUR,KLOR-CON) 20 MEQ tablet controlled-release ER tablet, Take 1 tablet by mouth Daily., Disp: 30 tablet, Rfl: 0  •  venlafaxine XR (EFFEXOR-XR) 37.5 MG 24 hr capsule, TAKE ONE CAPSULE BY MOUTH DAILY, Disp: 30 capsule, Rfl: 5  •  anastrozole (ARIMIDEX) 1 MG tablet, Take 1 tablet by mouth Daily., Disp: 30 tablet, Rfl: 5  •  Calcium Citrate-Vitamin D (CALCIUM + D  PO), Take  by mouth., Disp: , Rfl:   •  colestipol (COLESTID) 1 g tablet, Take 3 tablets by mouth Daily. Can increase to twice a day if needed, Disp: 160 tablet, Rfl: 1  •  MULTIPLE VITAMINS PO, Take  by mouth., Disp: , Rfl:     Allergies   Allergen Reactions   • No Known Drug Allergy        Social History     Socioeconomic History   • Marital status:      Spouse name: En   • Number of children: 2   • Years of education: College   • Highest education level: Not on file   Occupational History   • Occupation: Psychologist     Employer: [employer]   Tobacco Use   • Smoking status: Never Smoker   • Smokeless tobacco: Never Used   Substance and Sexual Activity   • Alcohol use: No   • Drug use: No   • Sexual activity: Defer       Family History   Problem Relation Age of Onset   • No Known Problems Mother    • No Known Problems Father    • Prostate cancer Maternal Uncle 54   • Breast cancer Paternal Aunt 40   • Breast cancer Maternal Grandmother 58   • Breast cancer Maternal Aunt         In her 50s   • Breast cancer Maternal Aunt         In her 50s   • Heart disease Other    • Hypertension Other        Review of Systems   Constitutional: Positive for fatigue. Negative for activity change and appetite change.   HENT: Negative for sore throat and trouble swallowing.    Respiratory: Negative.    Cardiovascular: Negative.    Gastrointestinal: Positive for diarrhea. Negative for abdominal distention, abdominal pain and blood in stool.   Endocrine: Negative for cold intolerance and heat intolerance.   Genitourinary: Negative for difficulty urinating, dysuria and frequency.   Musculoskeletal: Negative for arthralgias, back pain and myalgias.   Skin: Negative.    Hematological: Negative for adenopathy. Does not bruise/bleed easily.   All other systems reviewed and are negative.      Objective     Vitals:    08/06/19 1359   BP: 96/66   Temp: 97.7 °F (36.5 °C)         08/06/19  1359   Weight: 49.6  kg (109 lb 6.4 oz)     Body mass index is 21.37 kg/m².    Physical Exam   Constitutional: She is oriented to person, place, and time. She appears well-developed and well-nourished. No distress.   HENT:   Head: Normocephalic and atraumatic.   Right Ear: External ear normal.   Left Ear: External ear normal.   Nose: Nose normal.   Mouth/Throat: Oropharynx is clear and moist.   Eyes: Conjunctivae and EOM are normal. Right eye exhibits no discharge. Left eye exhibits no discharge. No scleral icterus.   Neck: Normal range of motion. Neck supple. No thyromegaly present.   No supraclavicular adenopathy   Cardiovascular: Normal rate, regular rhythm, normal heart sounds and intact distal pulses. Exam reveals no gallop.   No murmur heard.  No lower extremity edema   Pulmonary/Chest: Effort normal and breath sounds normal. No respiratory distress. She has no wheezes.   Abdominal: Soft. Normal appearance and bowel sounds are normal. She exhibits no distension and no mass. There is no hepatosplenomegaly. There is no tenderness. There is no rigidity, no rebound and no guarding. No hernia.   Genitourinary:   Genitourinary Comments: Rectal exam deferred   Musculoskeletal: Normal range of motion. She exhibits no edema or tenderness.   No atrophy of upper or lower extremities.  Normal digits and nails of both hands.   Lymphadenopathy:     She has no cervical adenopathy.   Neurological: She is alert and oriented to person, place, and time. She displays no atrophy. Coordination normal.   Skin: Skin is warm and dry. No rash noted. She is not diaphoretic. No erythema.   Psychiatric: She has a normal mood and affect. Her behavior is normal. Judgment and thought content normal.   Vitals reviewed.      WBC   Date Value Ref Range Status   07/31/2019 6.92 3.40 - 10.80 10*3/mm3 Final     RBC   Date Value Ref Range Status   07/31/2019 5.45 (H) 3.77 - 5.28 10*6/mm3 Final     Hemoglobin   Date Value Ref Range Status   07/31/2019 14.7 12.0 - 15.9  g/dL Final     Hematocrit   Date Value Ref Range Status   07/31/2019 43.8 34.0 - 46.6 % Final     MCV   Date Value Ref Range Status   07/31/2019 80.4 79.0 - 97.0 fL Final     MCH   Date Value Ref Range Status   07/31/2019 27.0 26.6 - 33.0 pg Final     MCHC   Date Value Ref Range Status   07/31/2019 33.6 31.5 - 35.7 g/dL Final     RDW   Date Value Ref Range Status   07/31/2019 15.2 12.3 - 15.4 % Final     RDW-SD   Date Value Ref Range Status   07/31/2019 43.1 37.0 - 54.0 fl Final     MPV   Date Value Ref Range Status   07/31/2019 9.5 6.0 - 12.0 fL Final     Platelets   Date Value Ref Range Status   07/31/2019 367 140 - 450 10*3/mm3 Final     Neutrophil %   Date Value Ref Range Status   04/15/2019 61.6 42.7 - 76.0 % Final     Lymphocyte %   Date Value Ref Range Status   04/15/2019 23.6 19.6 - 45.3 % Final     Monocyte %   Date Value Ref Range Status   04/15/2019 12.8 (H) 5.0 - 12.0 % Final     Eosinophil %   Date Value Ref Range Status   04/15/2019 1.2 0.3 - 6.2 % Final     Basophil %   Date Value Ref Range Status   04/15/2019 0.4 0.0 - 1.5 % Final     Immature Grans %   Date Value Ref Range Status   04/15/2019 0.4 0.0 - 0.5 % Final     Neutrophils, Absolute   Date Value Ref Range Status   04/15/2019 3.13 1.40 - 7.00 10*3/mm3 Final     Lymphocytes, Absolute   Date Value Ref Range Status   04/15/2019 1.20 0.70 - 3.10 10*3/mm3 Final     Monocytes, Absolute   Date Value Ref Range Status   04/15/2019 0.65 0.10 - 0.90 10*3/mm3 Final     Eosinophils, Absolute   Date Value Ref Range Status   04/15/2019 0.06 0.00 - 0.40 10*3/mm3 Final     Basophils, Absolute   Date Value Ref Range Status   04/15/2019 0.02 0.00 - 0.20 10*3/mm3 Final     Immature Grans, Absolute   Date Value Ref Range Status   04/15/2019 0.02 0.00 - 0.05 10*3/mm3 Final     nRBC   Date Value Ref Range Status   04/15/2019 0.0 0.0 - 0.0 /100 WBC Final       Glucose   Date Value Ref Range Status   08/05/2019 77 65 - 99 mg/dL Final     Sodium   Date Value Ref Range  Status   08/05/2019 139 136 - 145 mmol/L Final     Potassium   Date Value Ref Range Status   08/05/2019 3.4 (L) 3.5 - 5.2 mmol/L Final     CO2   Date Value Ref Range Status   08/05/2019 20.7 (L) 22.0 - 29.0 mmol/L Final     Chloride   Date Value Ref Range Status   08/05/2019 111 (H) 98 - 107 mmol/L Final     Anion Gap   Date Value Ref Range Status   08/05/2019 7.3 5.0 - 15.0 mmol/L Final     Creatinine   Date Value Ref Range Status   08/05/2019 0.54 (L) 0.57 - 1.00 mg/dL Final     BUN   Date Value Ref Range Status   08/05/2019 6 6 - 20 mg/dL Final     BUN/Creatinine Ratio   Date Value Ref Range Status   08/05/2019 11.1 7.0 - 25.0 Final     Calcium   Date Value Ref Range Status   08/05/2019 8.4 (L) 8.6 - 10.5 mg/dL Final     eGFR Non  Amer   Date Value Ref Range Status   08/05/2019 126 >60 mL/min/1.73 Final     Alkaline Phosphatase   Date Value Ref Range Status   07/31/2019 75 39 - 117 U/L Final     Total Protein   Date Value Ref Range Status   07/31/2019 5.1 (L) 6.0 - 8.5 g/dL Final     ALT (SGPT)   Date Value Ref Range Status   07/31/2019 50 (H) 1 - 33 U/L Final     AST (SGOT)   Date Value Ref Range Status   07/31/2019 50 (H) 1 - 32 U/L Final     Total Bilirubin   Date Value Ref Range Status   07/31/2019 0.2 0.1 - 1.2 mg/dL Final     Albumin   Date Value Ref Range Status   07/31/2019 3.00 (L) 3.50 - 5.20 g/dL Final     Globulin   Date Value Ref Range Status   07/31/2019 2.1 gm/dL Final         Imaging Results (last 7 days)     ** No results found for the last 168 hours. **            No notes on file    Assessment/Plan    Diarrhea: Acute, severe.  Normal GI PCR.  Differential of infectious versus inflammatory versus severe IBS    Elevated transaminase level: Chronic issue, questionably secondary to medications versus alcohol    Plan  Trial of colestipol  Plan for colonoscopy for further evaluation and tissue sampling  Advised increased efforts at hydration with Gatorade along with saltine crackers or  Pedialyte with saltine crackers  Advised to ER if symptoms worsen prior to her colonoscopy  We will follow her transaminases, for further work-up following work-up of her more acute issue of diarrhea    Sravani was seen today for diarrhea.    Diagnoses and all orders for this visit:    Diarrhea of presumed infectious origin  -     Case Request; Standing  -     Follow Anesthesia Guidelines / Standing Orders; Future  -     Obtain Informed Consent; Future  -     Implement Anesthesia Orders Day of Procedure; Standing  -     Obtain Informed Consent; Standing  -     Verify bowel prep was successful; Standing  -     lactated ringers infusion  -     Case Request    Elevated transaminase level    Other orders  -     colestipol (COLESTID) 1 g tablet; Take 3 tablets by mouth Daily. Can increase to twice a day if needed        I have discussed the above plan with the patient.  They verbalize understanding and are in agreement with the plan.  They have been advised to contact the office for any questions, concerns, or changes related to their health.    Dictated utilizing Dragon dictation

## 2019-08-06 NOTE — PATIENT INSTRUCTIONS
Schedule the colonoscopy    Gatorade with 4 saltine crackers per bottle    Try the colestipol    To ER for any worsening    For any additional questions, concerns or changes to your condition after today's office visit please contact the office at [phone].

## 2019-08-06 NOTE — H&P (VIEW-ONLY)
Chief Complaint   Patient presents with   • Diarrhea       Subjective     HPI    Sravani Syk is a 39 y.o. female with a past medical history noted below who presents for evaluation of diarrhea.  Symptoms started about 3 weeks ago.  No infectious, dietary, or medication precipitants.  Describing 6-8 BMs per day.  Timing is daily.  Prior to this she would average about 1 BM per day.  Occasional nocturnal stools.  No blood in her stool. Lots of borborygmi.  No cramping or pain.  Stools are purely liquid.  She was prescribed lomotil at an urgent care center but noticed no change.  Tried imodium, no change.  She has had some weight loss with the symptoms.  Overall feels drained from the symptoms.  GI PCR panel 7/31 was normal.      Review of her labs show that she has had a chronic elevation in her AST and ALT, a bit higher on check of labs on 7/31    Reports seeing her oncologist recently who gave her some IV fluids which she said made her feel better.    No herbal supplements, artificial sugars     Hx of breast cancer in remission, diagnosed 5 years ago    Complete hysterectomy    No family history of IBD, no polyps or GI malignancy    Works as a school psychologist    Has never had any endoscopy.        Past Medical History:   Diagnosis Date   • Cancer (CMS/HCC)     Bilateral breast cancer; diagnosed 12/2013   • History of radiation therapy    • Lymphedema     Right hand and wrist         Current Outpatient Medications:   •  ibandronate (BONIVA) 150 MG tablet, Take 1 tablet by mouth Every 30 (Thirty) Days., Disp: 4 tablet, Rfl: 3  •  potassium chloride (K-DUR,KLOR-CON) 20 MEQ tablet controlled-release ER tablet, Take 1 tablet by mouth Daily., Disp: 30 tablet, Rfl: 0  •  venlafaxine XR (EFFEXOR-XR) 37.5 MG 24 hr capsule, TAKE ONE CAPSULE BY MOUTH DAILY, Disp: 30 capsule, Rfl: 5  •  anastrozole (ARIMIDEX) 1 MG tablet, Take 1 tablet by mouth Daily., Disp: 30 tablet, Rfl: 5  •  Calcium Citrate-Vitamin D (CALCIUM + D  PO), Take  by mouth., Disp: , Rfl:   •  colestipol (COLESTID) 1 g tablet, Take 3 tablets by mouth Daily. Can increase to twice a day if needed, Disp: 160 tablet, Rfl: 1  •  MULTIPLE VITAMINS PO, Take  by mouth., Disp: , Rfl:     Allergies   Allergen Reactions   • No Known Drug Allergy        Social History     Socioeconomic History   • Marital status:      Spouse name: En   • Number of children: 2   • Years of education: College   • Highest education level: Not on file   Occupational History   • Occupation: Psychologist     Employer: [employer]   Tobacco Use   • Smoking status: Never Smoker   • Smokeless tobacco: Never Used   Substance and Sexual Activity   • Alcohol use: No   • Drug use: No   • Sexual activity: Defer       Family History   Problem Relation Age of Onset   • No Known Problems Mother    • No Known Problems Father    • Prostate cancer Maternal Uncle 54   • Breast cancer Paternal Aunt 40   • Breast cancer Maternal Grandmother 58   • Breast cancer Maternal Aunt         In her 50s   • Breast cancer Maternal Aunt         In her 50s   • Heart disease Other    • Hypertension Other        Review of Systems   Constitutional: Positive for fatigue. Negative for activity change and appetite change.   HENT: Negative for sore throat and trouble swallowing.    Respiratory: Negative.    Cardiovascular: Negative.    Gastrointestinal: Positive for diarrhea. Negative for abdominal distention, abdominal pain and blood in stool.   Endocrine: Negative for cold intolerance and heat intolerance.   Genitourinary: Negative for difficulty urinating, dysuria and frequency.   Musculoskeletal: Negative for arthralgias, back pain and myalgias.   Skin: Negative.    Hematological: Negative for adenopathy. Does not bruise/bleed easily.   All other systems reviewed and are negative.      Objective     Vitals:    08/06/19 1359   BP: 96/66   Temp: 97.7 °F (36.5 °C)         08/06/19  1359   Weight: 49.6  kg (109 lb 6.4 oz)     Body mass index is 21.37 kg/m².    Physical Exam   Constitutional: She is oriented to person, place, and time. She appears well-developed and well-nourished. No distress.   HENT:   Head: Normocephalic and atraumatic.   Right Ear: External ear normal.   Left Ear: External ear normal.   Nose: Nose normal.   Mouth/Throat: Oropharynx is clear and moist.   Eyes: Conjunctivae and EOM are normal. Right eye exhibits no discharge. Left eye exhibits no discharge. No scleral icterus.   Neck: Normal range of motion. Neck supple. No thyromegaly present.   No supraclavicular adenopathy   Cardiovascular: Normal rate, regular rhythm, normal heart sounds and intact distal pulses. Exam reveals no gallop.   No murmur heard.  No lower extremity edema   Pulmonary/Chest: Effort normal and breath sounds normal. No respiratory distress. She has no wheezes.   Abdominal: Soft. Normal appearance and bowel sounds are normal. She exhibits no distension and no mass. There is no hepatosplenomegaly. There is no tenderness. There is no rigidity, no rebound and no guarding. No hernia.   Genitourinary:   Genitourinary Comments: Rectal exam deferred   Musculoskeletal: Normal range of motion. She exhibits no edema or tenderness.   No atrophy of upper or lower extremities.  Normal digits and nails of both hands.   Lymphadenopathy:     She has no cervical adenopathy.   Neurological: She is alert and oriented to person, place, and time. She displays no atrophy. Coordination normal.   Skin: Skin is warm and dry. No rash noted. She is not diaphoretic. No erythema.   Psychiatric: She has a normal mood and affect. Her behavior is normal. Judgment and thought content normal.   Vitals reviewed.      WBC   Date Value Ref Range Status   07/31/2019 6.92 3.40 - 10.80 10*3/mm3 Final     RBC   Date Value Ref Range Status   07/31/2019 5.45 (H) 3.77 - 5.28 10*6/mm3 Final     Hemoglobin   Date Value Ref Range Status   07/31/2019 14.7 12.0 - 15.9  g/dL Final     Hematocrit   Date Value Ref Range Status   07/31/2019 43.8 34.0 - 46.6 % Final     MCV   Date Value Ref Range Status   07/31/2019 80.4 79.0 - 97.0 fL Final     MCH   Date Value Ref Range Status   07/31/2019 27.0 26.6 - 33.0 pg Final     MCHC   Date Value Ref Range Status   07/31/2019 33.6 31.5 - 35.7 g/dL Final     RDW   Date Value Ref Range Status   07/31/2019 15.2 12.3 - 15.4 % Final     RDW-SD   Date Value Ref Range Status   07/31/2019 43.1 37.0 - 54.0 fl Final     MPV   Date Value Ref Range Status   07/31/2019 9.5 6.0 - 12.0 fL Final     Platelets   Date Value Ref Range Status   07/31/2019 367 140 - 450 10*3/mm3 Final     Neutrophil %   Date Value Ref Range Status   04/15/2019 61.6 42.7 - 76.0 % Final     Lymphocyte %   Date Value Ref Range Status   04/15/2019 23.6 19.6 - 45.3 % Final     Monocyte %   Date Value Ref Range Status   04/15/2019 12.8 (H) 5.0 - 12.0 % Final     Eosinophil %   Date Value Ref Range Status   04/15/2019 1.2 0.3 - 6.2 % Final     Basophil %   Date Value Ref Range Status   04/15/2019 0.4 0.0 - 1.5 % Final     Immature Grans %   Date Value Ref Range Status   04/15/2019 0.4 0.0 - 0.5 % Final     Neutrophils, Absolute   Date Value Ref Range Status   04/15/2019 3.13 1.40 - 7.00 10*3/mm3 Final     Lymphocytes, Absolute   Date Value Ref Range Status   04/15/2019 1.20 0.70 - 3.10 10*3/mm3 Final     Monocytes, Absolute   Date Value Ref Range Status   04/15/2019 0.65 0.10 - 0.90 10*3/mm3 Final     Eosinophils, Absolute   Date Value Ref Range Status   04/15/2019 0.06 0.00 - 0.40 10*3/mm3 Final     Basophils, Absolute   Date Value Ref Range Status   04/15/2019 0.02 0.00 - 0.20 10*3/mm3 Final     Immature Grans, Absolute   Date Value Ref Range Status   04/15/2019 0.02 0.00 - 0.05 10*3/mm3 Final     nRBC   Date Value Ref Range Status   04/15/2019 0.0 0.0 - 0.0 /100 WBC Final       Glucose   Date Value Ref Range Status   08/05/2019 77 65 - 99 mg/dL Final     Sodium   Date Value Ref Range  Status   08/05/2019 139 136 - 145 mmol/L Final     Potassium   Date Value Ref Range Status   08/05/2019 3.4 (L) 3.5 - 5.2 mmol/L Final     CO2   Date Value Ref Range Status   08/05/2019 20.7 (L) 22.0 - 29.0 mmol/L Final     Chloride   Date Value Ref Range Status   08/05/2019 111 (H) 98 - 107 mmol/L Final     Anion Gap   Date Value Ref Range Status   08/05/2019 7.3 5.0 - 15.0 mmol/L Final     Creatinine   Date Value Ref Range Status   08/05/2019 0.54 (L) 0.57 - 1.00 mg/dL Final     BUN   Date Value Ref Range Status   08/05/2019 6 6 - 20 mg/dL Final     BUN/Creatinine Ratio   Date Value Ref Range Status   08/05/2019 11.1 7.0 - 25.0 Final     Calcium   Date Value Ref Range Status   08/05/2019 8.4 (L) 8.6 - 10.5 mg/dL Final     eGFR Non  Amer   Date Value Ref Range Status   08/05/2019 126 >60 mL/min/1.73 Final     Alkaline Phosphatase   Date Value Ref Range Status   07/31/2019 75 39 - 117 U/L Final     Total Protein   Date Value Ref Range Status   07/31/2019 5.1 (L) 6.0 - 8.5 g/dL Final     ALT (SGPT)   Date Value Ref Range Status   07/31/2019 50 (H) 1 - 33 U/L Final     AST (SGOT)   Date Value Ref Range Status   07/31/2019 50 (H) 1 - 32 U/L Final     Total Bilirubin   Date Value Ref Range Status   07/31/2019 0.2 0.1 - 1.2 mg/dL Final     Albumin   Date Value Ref Range Status   07/31/2019 3.00 (L) 3.50 - 5.20 g/dL Final     Globulin   Date Value Ref Range Status   07/31/2019 2.1 gm/dL Final         Imaging Results (last 7 days)     ** No results found for the last 168 hours. **            No notes on file    Assessment/Plan    Diarrhea: Acute, severe.  Normal GI PCR.  Differential of infectious versus inflammatory versus severe IBS    Elevated transaminase level: Chronic issue, questionably secondary to medications versus alcohol    Plan  Trial of colestipol  Plan for colonoscopy for further evaluation and tissue sampling  Advised increased efforts at hydration with Gatorade along with saltine crackers or  Pedialyte with saltine crackers  Advised to ER if symptoms worsen prior to her colonoscopy  We will follow her transaminases, for further work-up following work-up of her more acute issue of diarrhea    Sravani was seen today for diarrhea.    Diagnoses and all orders for this visit:    Diarrhea of presumed infectious origin  -     Case Request; Standing  -     Follow Anesthesia Guidelines / Standing Orders; Future  -     Obtain Informed Consent; Future  -     Implement Anesthesia Orders Day of Procedure; Standing  -     Obtain Informed Consent; Standing  -     Verify bowel prep was successful; Standing  -     lactated ringers infusion  -     Case Request    Elevated transaminase level    Other orders  -     colestipol (COLESTID) 1 g tablet; Take 3 tablets by mouth Daily. Can increase to twice a day if needed        I have discussed the above plan with the patient.  They verbalize understanding and are in agreement with the plan.  They have been advised to contact the office for any questions, concerns, or changes related to their health.    Dictated utilizing Dragon dictation

## 2019-08-07 PROBLEM — R74.01 ELEVATED TRANSAMINASE LEVEL: Status: ACTIVE | Noted: 2019-08-07

## 2019-08-09 ENCOUNTER — TELEPHONE (OUTPATIENT)
Dept: ONCOLOGY | Facility: HOSPITAL | Age: 39
End: 2019-08-09

## 2019-08-09 RX ORDER — CETIRIZINE HYDROCHLORIDE 10 MG/1
10 TABLET ORAL DAILY
COMMUNITY
End: 2020-10-27

## 2019-08-09 NOTE — TELEPHONE ENCOUNTER
----- Message from Ida Grier sent at 8/9/2019 10:15 AM EDT -----  [phone]   needs to talk with a nurse about a  CT scan of ABD.         Pt called and said GI scheduled a CT at the same time she is getting a Colonoscopy and wanted to know if it should be rescheduled or should it be cancelled since the scope is due to her diarrhea. Reviewed with Dr Moreno who wants it re-scheduled. Message sent to scheduling and pt made aware.

## 2019-08-12 ENCOUNTER — APPOINTMENT (OUTPATIENT)
Dept: CT IMAGING | Facility: HOSPITAL | Age: 39
End: 2019-08-12

## 2019-08-12 ENCOUNTER — HOSPITAL ENCOUNTER (OUTPATIENT)
Facility: HOSPITAL | Age: 39
Setting detail: HOSPITAL OUTPATIENT SURGERY
Discharge: HOME | End: 2019-08-12
Attending: INTERNAL MEDICINE | Admitting: INTERNAL MEDICINE

## 2019-08-12 ENCOUNTER — ANESTHESIA EVENT (OUTPATIENT)
Dept: GASTROENTEROLOGY | Facility: HOSPITAL | Age: 39
End: 2019-08-12

## 2019-08-12 ENCOUNTER — ANESTHESIA (OUTPATIENT)
Dept: GASTROENTEROLOGY | Facility: HOSPITAL | Age: 39
End: 2019-08-12

## 2019-08-12 VITALS
HEART RATE: 74 BPM | RESPIRATION RATE: 16 BRPM | HEIGHT: 60 IN | SYSTOLIC BLOOD PRESSURE: 103 MMHG | DIASTOLIC BLOOD PRESSURE: 65 MMHG | WEIGHT: 99.5 LBS | TEMPERATURE: 97.9 F | BODY MASS INDEX: 19.53 KG/M2 | OXYGEN SATURATION: 99 %

## 2019-08-12 DIAGNOSIS — R19.7 DIARRHEA OF PRESUMED INFECTIOUS ORIGIN: ICD-10-CM

## 2019-08-12 PROCEDURE — 88305 TISSUE EXAM BY PATHOLOGIST: CPT | Performed by: INTERNAL MEDICINE

## 2019-08-12 PROCEDURE — 25010000002 PROPOFOL 10 MG/ML EMULSION: Performed by: ANESTHESIOLOGY

## 2019-08-12 PROCEDURE — 45380 COLONOSCOPY AND BIOPSY: CPT | Performed by: INTERNAL MEDICINE

## 2019-08-12 RX ORDER — PROPOFOL 10 MG/ML
VIAL (ML) INTRAVENOUS AS NEEDED
Status: DISCONTINUED | OUTPATIENT
Start: 2019-08-12 | End: 2019-08-12 | Stop reason: SURG

## 2019-08-12 RX ORDER — SODIUM CHLORIDE, SODIUM LACTATE, POTASSIUM CHLORIDE, CALCIUM CHLORIDE 600; 310; 30; 20 MG/100ML; MG/100ML; MG/100ML; MG/100ML
30 INJECTION, SOLUTION INTRAVENOUS CONTINUOUS
Status: DISCONTINUED | OUTPATIENT
Start: 2019-08-12 | End: 2019-08-12 | Stop reason: HOSPADM

## 2019-08-12 RX ORDER — SODIUM CHLORIDE, SODIUM LACTATE, POTASSIUM CHLORIDE, CALCIUM CHLORIDE 600; 310; 30; 20 MG/100ML; MG/100ML; MG/100ML; MG/100ML
1000 INJECTION, SOLUTION INTRAVENOUS CONTINUOUS
Status: DISCONTINUED | OUTPATIENT
Start: 2019-08-12 | End: 2019-08-12 | Stop reason: HOSPADM

## 2019-08-12 RX ORDER — LIDOCAINE HYDROCHLORIDE 10 MG/ML
0.5 INJECTION, SOLUTION INFILTRATION; PERINEURAL ONCE AS NEEDED
Status: DISCONTINUED | OUTPATIENT
Start: 2019-08-12 | End: 2019-08-12 | Stop reason: HOSPADM

## 2019-08-12 RX ORDER — PROPOFOL 10 MG/ML
VIAL (ML) INTRAVENOUS CONTINUOUS PRN
Status: DISCONTINUED | OUTPATIENT
Start: 2019-08-12 | End: 2019-08-12 | Stop reason: SURG

## 2019-08-12 RX ORDER — LIDOCAINE HYDROCHLORIDE 20 MG/ML
INJECTION, SOLUTION INFILTRATION; PERINEURAL AS NEEDED
Status: DISCONTINUED | OUTPATIENT
Start: 2019-08-12 | End: 2019-08-12 | Stop reason: SURG

## 2019-08-12 RX ORDER — CHOLESTYRAMINE 4 G/9G
4 POWDER, FOR SUSPENSION ORAL 2 TIMES DAILY
Qty: 378 G | Refills: 3 | Status: SHIPPED | OUTPATIENT
Start: 2019-08-12 | End: 2019-11-05 | Stop reason: HOSPADM

## 2019-08-12 RX ORDER — SODIUM CHLORIDE 0.9 % (FLUSH) 0.9 %
3 SYRINGE (ML) INJECTION AS NEEDED
Status: DISCONTINUED | OUTPATIENT
Start: 2019-08-12 | End: 2019-08-12 | Stop reason: HOSPADM

## 2019-08-12 RX ADMIN — SODIUM CHLORIDE, POTASSIUM CHLORIDE, SODIUM LACTATE AND CALCIUM CHLORIDE 30 ML/HR: 600; 310; 30; 20 INJECTION, SOLUTION INTRAVENOUS at 12:58

## 2019-08-12 RX ADMIN — LIDOCAINE HYDROCHLORIDE 50 MG: 20 INJECTION, SOLUTION INFILTRATION; PERINEURAL at 13:12

## 2019-08-12 RX ADMIN — PROPOFOL 125 MG: 10 INJECTION, EMULSION INTRAVENOUS at 13:12

## 2019-08-12 RX ADMIN — PROPOFOL 140 MCG/KG/MIN: 10 INJECTION, EMULSION INTRAVENOUS at 13:12

## 2019-08-12 RX ADMIN — EPHEDRINE SULFATE 10 MG: 50 INJECTION INTRAMUSCULAR; INTRAVENOUS; SUBCUTANEOUS at 13:19

## 2019-08-12 NOTE — ANESTHESIA POSTPROCEDURE EVALUATION
Patient: Sravani Sky    Procedure Summary     Date:  08/12/19 Room / Location:  [facility] ENDOSCOPY 7 /  [facility]    Anesthesia Start:  1309 Anesthesia Stop:  1333    Procedure:  COLONOSCOPY TO CECUM AND TI WITH COLD BIOPSIES (N/A ) Diagnosis:       Diarrhea of presumed infectious origin      (Diarrhea of presumed infectious origin [R19.7])    Surgeon:  Sonja Sales MD Provider:  Bertrand Arias MD    Anesthesia Type:  MAC ASA Status:  2          Anesthesia Type: MAC  Last vitals  BP   103/65 (08/12/19 1355)   Temp   36.6 °C (97.9 °F) (08/12/19 1238)   Pulse   74 (08/12/19 1355)   Resp   16 (08/12/19 1355)     SpO2   99 % (08/12/19 1355)     Post Anesthesia Care and Evaluation    Patient location during evaluation: bedside  Patient participation: complete - patient participated  Level of consciousness: awake and alert  Pain score: 0  Pain management: adequate  Airway patency: patent  Anesthetic complications: No anesthetic complications  PONV Status: none  Cardiovascular status: acceptable  Respiratory status: acceptable  Hydration status: acceptable  Post Neuraxial Block status: Motor and sensory function returned to baseline

## 2019-08-12 NOTE — DISCHARGE INSTRUCTIONS
For the next 24 hours patient needs to be with a responsible adult.    For 24 hours DO NOT drive, operate machinery, appliances, drink alcohol, make important decisions or sign legal documents.    Start with a light or bland diet and advance to regular diet as tolerated.    Follow recommendations on procedure report provided by your doctor.    Call Dr Sales for problems [phone]    Problems may include but not limited to: large amounts of bleeding, trouble breathing, repeated vomiting, severe unrelieved pain, fever or chills.

## 2019-08-13 LAB
CYTO UR: NORMAL
LAB AP CASE REPORT: NORMAL
LAB AP DIAGNOSIS COMMENT: NORMAL
PATH REPORT.FINAL DX SPEC: NORMAL
PATH REPORT.GROSS SPEC: NORMAL

## 2019-08-13 RX ORDER — BUDESONIDE 9 MG/1
1 TABLET, FILM COATED, EXTENDED RELEASE ORAL DAILY
Qty: 40 TABLET | Refills: 0 | Status: SHIPPED | OUTPATIENT
Start: 2019-08-13 | End: 2019-11-05 | Stop reason: HOSPADM

## 2019-08-14 ENCOUNTER — TELEPHONE (OUTPATIENT)
Dept: ONCOLOGY | Facility: HOSPITAL | Age: 39
End: 2019-08-14

## 2019-08-14 ENCOUNTER — TELEPHONE (OUTPATIENT)
Dept: GASTROENTEROLOGY | Facility: CLINIC | Age: 39
End: 2019-08-14

## 2019-08-14 NOTE — TELEPHONE ENCOUNTER
If she can get the budesonide, this should keep her from needing the lomotil.  I recommend using the colestipol until her diarrhea gets under control

## 2019-08-14 NOTE — TELEPHONE ENCOUNTER
Call to pt.  Advise per Dr Sales that if can get the budesonide, this should keep from needing the lomotil.  Recommend using the colestipol until diarrhea gets under control.    Verb understanding.  States that [facility] has questions re: budesonide quantity - cannot fill until corrected.  Call to [facility] @ [phone] and spoke with Pharmacist.  Budesonide ordered daily for 4 wks, then every other day for 2 wks (28 +7=35).  Pharmacist runs rx - cost would be $887 with insurance.      It is noted that [employer] has savings card - may cost as little as $25 with card.  Call to pt - willing to try activating card to see if more affordable.      Call to [facility] and spoke with Nando.  Advise switch to [employer] - same sig.  SREE.  R&V.    Update to Dr Sales.

## 2019-08-14 NOTE — TELEPHONE ENCOUNTER
Pt calling to make sure that she still needs CT scan tomorrow,  States she got results of colonoscopy done that shows lymphocytic colitis  Discussed with Dr Moreno, she wants patient to get the CT scan  Called patient and informed her .  She V/U

## 2019-08-14 NOTE — TELEPHONE ENCOUNTER
----- Message from Katie Espinosa sent at 8/14/2019 10:29 AM EDT -----  Contact: [phone]  Pt calling to see if she still will need to get CT scan?

## 2019-08-14 NOTE — TELEPHONE ENCOUNTER
Called pt and advised per Dr Sales that she does in fact have lymphocytic colitis.  She has ordered budesonide for her to take as directed.  Advised pt to let us know if med is too expensive.    Pt verb understanding.  Pt is asking if she is to continue lomotil.  If so she will need a new script.  Also pt is asking if she needs to continue the cholestyramine.  Advised will send message to Dr Sales.

## 2019-08-14 NOTE — TELEPHONE ENCOUNTER
----- Message from Sonja Sales MD sent at 8/13/2019  2:26 PM EDT -----  She does in fact have lymphocytic colitis.  I am ordering budesonide for her to take as directed.

## 2019-08-15 ENCOUNTER — HOSPITAL ENCOUNTER (OUTPATIENT)
Dept: CT IMAGING | Facility: HOSPITAL | Age: 39
Discharge: HOME | End: 2019-08-15
Admitting: INTERNAL MEDICINE

## 2019-08-15 DIAGNOSIS — Z17.0 MALIGNANT NEOPLASM OF CENTRAL PORTION OF BOTH BREASTS IN FEMALE, ESTROGEN RECEPTOR POSITIVE (HCC): ICD-10-CM

## 2019-08-15 DIAGNOSIS — C50.111 MALIGNANT NEOPLASM OF CENTRAL PORTION OF BOTH BREASTS IN FEMALE, ESTROGEN RECEPTOR POSITIVE (HCC): ICD-10-CM

## 2019-08-15 DIAGNOSIS — C50.112 MALIGNANT NEOPLASM OF CENTRAL PORTION OF BOTH BREASTS IN FEMALE, ESTROGEN RECEPTOR POSITIVE (HCC): ICD-10-CM

## 2019-08-15 PROCEDURE — 71260 CT THORAX DX C+: CPT

## 2019-08-15 PROCEDURE — 74177 CT ABD & PELVIS W/CONTRAST: CPT

## 2019-08-15 PROCEDURE — 25010000002 IOPAMIDOL 61 % SOLUTION: Performed by: INTERNAL MEDICINE

## 2019-08-15 PROCEDURE — 0 DIATRIZOATE MEGLUMINE & SODIUM PER 1 ML: Performed by: INTERNAL MEDICINE

## 2019-08-15 RX ADMIN — DIATRIZOATE MEGLUMINE AND DIATRIZOATE SODIUM 30 ML: 660; 100 LIQUID ORAL; RECTAL at 08:05

## 2019-08-15 RX ADMIN — IOPAMIDOL 90 ML: 612 INJECTION, SOLUTION INTRAVENOUS at 09:02

## 2019-08-16 ENCOUNTER — TELEPHONE (OUTPATIENT)
Dept: GASTROENTEROLOGY | Facility: CLINIC | Age: 39
End: 2019-08-16

## 2019-08-16 NOTE — TELEPHONE ENCOUNTER
Called pt and pt reports that she began the budesonide yesterday am and by yesterday afternoon her ankles very swollen.  She states today it is pitting edema.  . She is asking if she could continue this.  Advised will send message to Dr Sales.   Pt verb understanding.

## 2019-08-16 NOTE — TELEPHONE ENCOUNTER
Usually the budesonide is localized only to the gut and does not act systemically.  I have not heard of this as a frequent side effect.  If she can tolerate it, advise getting some support socks/stockings to help with the swelling and continuing the budesonide as there are few other options to treat the colitis.

## 2019-08-16 NOTE — PROGRESS NOTES
Subjective      EASONS FOR FOLLOWUP:   1. [id number] triple negative right breast cancer.   2. T1Nx left breast cancer invasive ductal carcinoma, ER/IA positive, HER2 negative.   3. BRCA 1 positive.   4. Right internal mammary node positive on CT scan.   5. Neoadjuvant dose dense Adriamycin/Cytoxan, weekly Taxol started on 01/20/2014 with plan for axillary dissection on the right and sentinel node on left, bilateral mastectomies plus radiation to the right breast and internal mammary nodes.   6. Addition of carboplatin with weekly Taxol to start on 03/24/2014.   7. Carboplatin held on 05/05/2014 due to thrombocytopenia.   8. The patient was seen on 08/05/2014, postoperatively; bilateral mastectomies and sentinel nodes with a 5 mm residua l grade 3 tumor in the right breast with two negative sentinel nodes (cannot tell if the node with a clip was removed). Complete response in the left breast with two negative sentinel nodes. The decision was made to consider radiation because of her pret reatment internal mammary node on the right and tamoxifen therapy for the hormone positive breast cancer on right. Clinical trials are being looked into.   9. Additional surgery to remove the lymph node with the clip in the right axilla, 3 nodes removed and negative for metastatic disease. Tamoxifen started September 2014.   10. Radiation completed in October 2014. Tamoxifen on hold because of some neurological signs.   11. Not eligible for NSABP B-55 because of tamoxifen as of 10/20/2014.   12. Tamoxifen resumed on 11/18/2014 on every other day dose.   13. CT scans done for right neck discomfort with negative scans except for radiation scarring in the right lung apex.   14. Post total abdominal hysterectomy and BSO in 01/2015. Tamoxifen resumed with switch to an AI in 7/15  15. Lymphedema, right arm.                   16.  Iron deficiency anemia    History of Present Illness  patient is a 36-year-old white female with BRCA1  positivity and bilateral breast cancers currently on Arimidex for the last 3 years after 1 year of tamoxifen.  Comes in for follow-up after 1 month diarrheal illness which is dragged her down and made her extremely weak and she has lost 12 pounds.  I was concerned enough to do CAT scans to make sure she has not had recurrence of her breast cancer in the meantime we referred her to GI and they did a colonoscopy and diagnosed lymphocytic colitis and gave her budesonide orally which is helped somewhat but she still does not feel good.  Thankfully she has however gained back about half the weight she lost but is still not feeling back to baseline    CAT scan of the chest abdomen pelvis showed no evidence of recurrent cancer but a fatty liver was noted  I told her to follow-up with the gastroenterologist to ask how best to deal with her fatty liver and also how long to stay on the steroids as she has no follow-up appointment with them    Active Ambulatory Problems     Diagnosis Date Noted   • Malignant neoplasm of female breast (CMS/HCC) 04/15/2016   • BRCA1 genetic carrier 04/15/2016   • Anemia 2016   • Long term current use of aromatase inhibitor 2016   • Osteopenia 2016   • Diarrhea of presumed infectious origin 2019   • Elevated transaminase level 2019   • Fatty liver 2019     Resolved Ambulatory Problems     Diagnosis Date Noted   • No Resolved Ambulatory Problems     Past Medical History:   Diagnosis Date   • Cancer (CMS/HCC)    • Diarrhea    • Heart burn    • History of radiation therapy    • Lymphedema      Past Surgical History:   Procedure Laterality Date   • BREAST SURGERY Bilateral 2014    Mastectomy   •  SECTION  , 2010    X2   • COLONOSCOPY N/A 2019    Procedure: COLONOSCOPY TO CECUM AND TI WITH COLD BIOPSIES;  Surgeon: Sonja Sales MD;  Location: [facility] ENDOSCOPY;  Service: Gastroenterology   • HYSTERECTOMY  2015    Total         OB/GYN HISTORY: She  is  2, para 2. Menarche at age 10. First childbirth wa s at age 26. She breast-fed her second child for 6 weeks. She was on birth control until her first childbirth and then used a Mirena IUD which she just had removed 3 months ago.     HEMATOLOGIC/ONCOLOGIC HISTORY:   The patient was seen on 2014, having had bilateral mastectomies. Right breast had a 5 mm residual invasive tumor, grade 3, with clear margins, two negative sentinel nodes (I cannot tell if the lymph node with the clip was remove d and we will have to assess this). Left breast shows a complete pathological response with two negative sentinel nodes. The decision was made to review her for radiation therapy because of enlarged right internal mammary node, pretreatment, and to star t tamoxifen. She is going to have her ovaries removed later in the year when she has expanders put in and we can switch to an AI at that point. I am looking into clinical trials for BCRA positive patients with residual disease and for triple negative canc ers with residual disease but the 5 mm tumor may disqualify her.        The patient was seen on 2014 with additional surgery to remove the lymph node in her axilla with the clip in it which was thankfully benign in addition to 3 other nodes which were nega tive. Radiation to the internal mammary chain planned plus tamoxifen for 5 years. The NSABP B55 study is expected to have an amendment including hormone positive patients and we will revisit this issue after her radiation. Her port has been removed.        NSA BP amendment has not yet gone through to include patients on tamoxifen, therefore, she is not eligible for NSABP B-55 as of 10/21/2014. Tamoxifen held for 1 month because of some dizzy spells and hysterectomy and oophorectomy planned for early , at wh ich point we will switch to Arimidex.        The patient was seen on 2014 having CT scans because a preop chest x-ray before hysterectomy  showed a shadow in the right apex. CT scans showed what seemed to be radiation change in the apex of the right lung. No abnormality in the neck or abdomen and a residual small seroma in the right axilla. Path reports on her hysterectomy and BSO were benign and because of her intolerance of tamoxifen, we started her on Effexor 12.5 b.i.d. to be increased as tolerated and t hen to resume tamoxifen every other day for a month and then go to daily if possible. Once she is stable at this, we will try and switch over to an AI if she can tolerate this.        The patient was seen on 04/21/2015 with a CT scan done to followup on changes noted on a previous CT scans, which were felt to be radiation-related. Thankfully, the scan shows diminished consolidation of the pleural surface and a small subpleural air space disease, stable at 8 x 4 mm and images through the upper abdomen and the re s t of the lung were negative. She is up to 37.5 of Effexor with good control of her hot flashes and taking her tamoxifen every other day and we have asked her to increase her tamoxifen to daily and we will see her back in 3 months and if she is tolerating this well, consider switching to an AI at that time.        Patient seen on 07/21/2015, tolerating tamoxifen well at full dose for the last 3 months since her hysterectomy. Plans to switch to Femara in 2-3 months. Nocturnal cough, which I suspect is allergic or reflux related. Consider a steroid inhaler.        7/17 Arimidex since July 2015.  Overall she is tolerating Arimidex well with very few hot flashes because of the Effexor but she is clearly not herself due to the postmenopausal state and has less energy low libido and I think some amount of depression which is not unusual in this situation.  Her mother had osteoporosis and her baseline bone density is osteopenia which is fairly severe and I added Boniva monthly in addition to calcium and vitamin D    .Her neck pain is resolved she  is sleeping okay appetite is fair and apart from fatigue which is related to her postmenopausal state she appears to be doing okay.  She is a little more irritable than usual but realizes this is part of the postmenopausal state  Complains of some irritable bowel type symptoms with constipation alternating with diarrhea and she is a little more anemic than usual and her ferritin was low and she was given oral iron with good improvement in her hemoglobin.  I'm reluctant to add another test with colonoscopy for at this point and we will wait and see how she does off iron and if her hemoglobin drops and her iron is low again the colonoscopy and EGD will be scheduled .  Her lymphedema is a little better this summer    She continues to have discomfort over the right chest wall where she received radiation and does not feel that the implant is his flexible and stiffer and because of the radiation fibrosis on CAT scan has been concerned about issues here.    Her liver function tests have been barely up on and off and therefore we will do another ultrasound to evaluate the liver and gallbladder.  2/19  she continues to have some tenderness in the rib cage below the right implant and is worried about this.  We did a bone scan in November which was benign but her bone density shows osteoporosis and she has been on Boniva more regularly now but I still want to repeat the bone density in November and add Prolia if it is not improved    SOCIAL HISTORY: She is , lives with her . She is school psychologist. She does not smoke. She drinks very minimally. No trouble with drug addiction.    FAMILY HISTORY: Parents are in their late 50s and are healthy. She has no siblings. She has a paternal aunt with breast cancer in 40s, maternal grandmother with breast cancer at age 58,   and both of the grandmother' s sisters had breast cancer in their 50s. She has a maternal uncle with prostate cancer at age 54. There is no  "ovarian, uterine or pancreatic cancer in the family that she is aware of. BRCA 1 positive    Review of Systems   Constitutional: Positive for fatigue (no changes 8/19/2019).   HENT: Negative.    Respiratory: Negative.    Cardiovascular: Negative.    Gastrointestinal: Positive for diarrhea (no change 4/15/19).   Endocrine: Positive for heat intolerance (no change 4/15/19).   Genitourinary: Negative.    Neurological: Negative.    Psychiatric/Behavioral: Positive for decreased concentration (no change 4/15/19), dysphoric mood (little better 4/15/19) and sleep disturbance (getting better 4/15/19). The patient is not nervous/anxious (better 4/15/19).    All other systems reviewed and are negative.     A comprehensive 14 point review of systems was performed and was negative except as mentioned.    Medications:  The current medication list was reviewed in the EMR    ALLERGIES:    Allergies   Allergen Reactions   • No Known Drug Allergy        Objective      Vitals:    08/19/19 0918   BP: 98/67   Pulse: 83   Resp: 16   Temp: 98.1 °F (36.7 °C)   TempSrc: Oral   SpO2: 100%   Weight: 49 kg (108 lb)   Height: 152.5 cm (60.04\")   PainSc: 0-No pain     Current Status 8/19/2019   ECOG score 0       Physical Exam   Pulmonary/Chest:           GENERAL:  Well-developed, well-nourished in no acute distress.   SKIN:  Warm, dry without rashes, purpura or petechiae.  HEAD:  Normocephalic.  EYES:  Pupils equal, round and reactive to light.  EOMs intact.  Conjunctivae normal.  EARS:  Hearing intact.  NOSE:  Septum midline.  No excoriations or nasal discharge.  MOUTH:  Tongue is well-papillated; no stomatitis or ulcers.  Lips normal.  THROAT:  Oropharynx without lesions or exudates.  NECK:  Supple with good range of motion; no thyromegaly or masses, no JVD.  LYMPHATICS:  No cervical, supraclavicular, axillary or inguinal adenopathy.  CHEST:  Lungs clear to percussion and auscultation. Good airflow.  BREASTS: Bilateral implants benign there " is no axillary adenopathy.Tenderness in the subcostal and intramammary rib cage right greater than left -point tenderness in the costochondral junction below the right breast-is a little prominence to 1 of her ribs lateral to the implant but  this is chronic- see diagram  CARDIAC:  Regular rate and rhythm without murmurs, rubs or gallops. Normal S1,S2.  ABDOMEN:  Soft, nontender with no organomegaly or masses.  EXTREMITIES:  No clubbing, cyanosis or edema.  NEUROLOGICAL:  Cranial Nerves II-XII grossly intact.  No focal neurological deficits.  PSYCHIATRIC:  Normal affect and mood.    Exam unchanged 8/19/2019    RECENT LABS:  Hematology WBC   Date Value Ref Range Status   08/19/2019 5.47 3.40 - 10.80 10*3/mm3 Final     RBC   Date Value Ref Range Status   08/19/2019 4.19 3.77 - 5.28 10*6/mm3 Final     Hemoglobin   Date Value Ref Range Status   08/19/2019 11.4 (L) 12.0 - 15.9 g/dL Final     Hematocrit   Date Value Ref Range Status   08/19/2019 35.1 34.0 - 46.6 % Final     Platelets   Date Value Ref Range Status   08/19/2019 243 140 - 450 10*3/mm3 Final      FINDINGS: Sonographic evaluation of the liver and selective structures  of the right upper quadrant was performed. The right kidney has a normal  appearance. No abnormality of the liver is appreciated. The liver  measures on the order of 17.5 cm in anterior to posterior dimension. The  gallbladder has a normal appearance. The common bile duct measures 0.4  cm in diameter. Per the sonographer, the patient was nontender in the  right upper quadrant during the examination. Visualized portion of the  pancreas appears normal.      IMPRESSION:  Negative liver sonogram.      This report was finalized on 4/14/2017       NUCLEAR MEDICINE WHOLE BODY BONE SCAN     IMPRESSION:  No scintigraphic evidence for bony metastatic disease.     This report was finalized on 11/17/2018     CT CHEST, ABDOMEN, AND PELVIS WITH IV CONTRAST  IMPRESSION:  1. There is no convincing evidence for  metastatic disease.  2. Paucity of formed stool within the colon. There is no colonic  thickening to suggest colitis.  3. Hepatic steatosis.         Assessment/Plan   1.[id number] triple negative right breast cancer. [id number] dose dense Adriamycin Cytoxan and weekly carbotaxol-on tamoxifen-switch to Arimidex after oophorectomy in July 2015  2.T1Nx left breast cancer invasive ductal carcinoma, ER/NM positive, HER2 negative. [id number]                  3. BRCA 1 positive. Post hysterectomy and oophorectomy                   4.  Lymphedema right arm improved with a sleeve                  5.  Mild depression declines any treatment for this now                  6.  Mild anemia due to iron deficiency responding to oral iron                  7.  Osteopenia moderately severe-declined Boniva  was started                   8.  Mildly elevated LFTs with negative liver and pancreas ultrasound -                     CAT scan consistent with fatty liver                   9.  Exacerbation of hemorrhoidal bleeding due to diarrhea                           10.  Lymphocytic colitis on budesonide    Plan  1.Continue Arimidex and Effexor  2. . Check Ca1 25 annually  3.  Anusol HC   4.  Referred to Dr. Cate You for hemorrhoidal bleeding  5.  Return in 3 months for follow-up.               8/19/2019      CC:

## 2019-08-16 NOTE — TELEPHONE ENCOUNTER
----- Message from Ginny Donis sent at 8/16/2019  3:11 PM EDT -----  Regarding: Call back  Contact: [phone]  Pt is calling to talk with a nurse regarding side effect of Usera

## 2019-08-16 NOTE — TELEPHONE ENCOUNTER
Called pt and advised per Dr Sales that usually the budesonide is localized only to the gut and does not act systemically.  She has not heard of this as a frequent side effect.  If she can tolerate it, she recommends getting some support socks /stocking to hlep with the swelling and continuing the budesonide as there are few other options to treat the colitis. Pt verb understanding.

## 2019-08-19 ENCOUNTER — LAB (OUTPATIENT)
Dept: OTHER | Facility: HOSPITAL | Age: 39
End: 2019-08-19

## 2019-08-19 ENCOUNTER — OFFICE VISIT (OUTPATIENT)
Dept: ONCOLOGY | Facility: CLINIC | Age: 39
End: 2019-08-19

## 2019-08-19 VITALS
DIASTOLIC BLOOD PRESSURE: 67 MMHG | BODY MASS INDEX: 21.2 KG/M2 | RESPIRATION RATE: 16 BRPM | WEIGHT: 108 LBS | OXYGEN SATURATION: 100 % | SYSTOLIC BLOOD PRESSURE: 98 MMHG | TEMPERATURE: 98.1 F | HEIGHT: 60 IN | HEART RATE: 83 BPM

## 2019-08-19 DIAGNOSIS — Z17.0 MALIGNANT NEOPLASM OF CENTRAL PORTION OF BOTH BREASTS IN FEMALE, ESTROGEN RECEPTOR POSITIVE (HCC): Primary | ICD-10-CM

## 2019-08-19 DIAGNOSIS — C50.112 MALIGNANT NEOPLASM OF CENTRAL PORTION OF BOTH BREASTS IN FEMALE, ESTROGEN RECEPTOR POSITIVE (HCC): Primary | ICD-10-CM

## 2019-08-19 DIAGNOSIS — K76.0 FATTY LIVER: ICD-10-CM

## 2019-08-19 DIAGNOSIS — C50.111 MALIGNANT NEOPLASM OF CENTRAL PORTION OF BOTH BREASTS IN FEMALE, ESTROGEN RECEPTOR POSITIVE (HCC): Primary | ICD-10-CM

## 2019-08-19 DIAGNOSIS — R74.01 ELEVATED TRANSAMINASE LEVEL: ICD-10-CM

## 2019-08-19 DIAGNOSIS — Z79.811 LONG TERM CURRENT USE OF AROMATASE INHIBITOR: ICD-10-CM

## 2019-08-19 LAB
ALBUMIN SERPL-MCNC: 2.8 G/DL (ref 3.5–5.2)
ALBUMIN/GLOB SERPL: 1.3 G/DL
ALP SERPL-CCNC: 72 U/L (ref 39–117)
ALT SERPL W P-5'-P-CCNC: 52 U/L (ref 1–33)
ANION GAP SERPL CALCULATED.3IONS-SCNC: 8.2 MMOL/L (ref 5–15)
AST SERPL-CCNC: 49 U/L (ref 1–32)
BASOPHILS # BLD AUTO: 0.02 10*3/MM3 (ref 0–0.2)
BASOPHILS NFR BLD AUTO: 0.4 % (ref 0–1.5)
BILIRUB SERPL-MCNC: <0.2 MG/DL (ref 0.1–1.2)
BUN BLD-MCNC: 14 MG/DL (ref 6–20)
BUN/CREAT SERPL: 35 (ref 7–25)
CALCIUM SPEC-SCNC: 8.2 MG/DL (ref 8.6–10.5)
CANCER AG125 SERPL QL: 3.9 U/ML (ref 0–38.1)
CHLORIDE SERPL-SCNC: 105 MMOL/L (ref 98–107)
CO2 SERPL-SCNC: 29.8 MMOL/L (ref 22–29)
CREAT BLD-MCNC: 0.4 MG/DL (ref 0.57–1)
DEPRECATED RDW RBC AUTO: 50.6 FL (ref 37–54)
EOSINOPHIL # BLD AUTO: 0.04 10*3/MM3 (ref 0–0.4)
EOSINOPHIL NFR BLD AUTO: 0.7 % (ref 0.3–6.2)
ERYTHROCYTE [DISTWIDTH] IN BLOOD BY AUTOMATED COUNT: 16.4 % (ref 12.3–15.4)
GFR SERPL CREATININE-BSD FRML MDRD: >150 ML/MIN/1.73
GLOBULIN UR ELPH-MCNC: 2.2 GM/DL
GLUCOSE BLD-MCNC: 95 MG/DL (ref 65–99)
HCT VFR BLD AUTO: 35.1 % (ref 34–46.6)
HGB BLD-MCNC: 11.4 G/DL (ref 12–15.9)
IMM GRANULOCYTES # BLD AUTO: 0.02 10*3/MM3 (ref 0–0.05)
IMM GRANULOCYTES NFR BLD AUTO: 0.4 % (ref 0–0.5)
LYMPHOCYTES # BLD AUTO: 1.13 10*3/MM3 (ref 0.7–3.1)
LYMPHOCYTES NFR BLD AUTO: 20.7 % (ref 19.6–45.3)
MCH RBC QN AUTO: 27.2 PG (ref 26.6–33)
MCHC RBC AUTO-ENTMCNC: 32.5 G/DL (ref 31.5–35.7)
MCV RBC AUTO: 83.8 FL (ref 79–97)
MONOCYTES # BLD AUTO: 0.54 10*3/MM3 (ref 0.1–0.9)
MONOCYTES NFR BLD AUTO: 9.9 % (ref 5–12)
NEUTROPHILS # BLD AUTO: 3.72 10*3/MM3 (ref 1.7–7)
NEUTROPHILS NFR BLD AUTO: 67.9 % (ref 42.7–76)
NRBC BLD AUTO-RTO: 0 /100 WBC (ref 0–0.2)
PLATELET # BLD AUTO: 243 10*3/MM3 (ref 140–450)
PMV BLD AUTO: 9.5 FL (ref 6–12)
POTASSIUM BLD-SCNC: 3.2 MMOL/L (ref 3.5–5.2)
PROT SERPL-MCNC: 5 G/DL (ref 6–8.5)
RBC # BLD AUTO: 4.19 10*6/MM3 (ref 3.77–5.28)
SODIUM BLD-SCNC: 143 MMOL/L (ref 136–145)
WBC NRBC COR # BLD: 5.47 10*3/MM3 (ref 3.4–10.8)

## 2019-08-19 PROCEDURE — 36415 COLL VENOUS BLD VENIPUNCTURE: CPT

## 2019-08-19 PROCEDURE — 80053 COMPREHEN METABOLIC PANEL: CPT | Performed by: INTERNAL MEDICINE

## 2019-08-19 PROCEDURE — 99214 OFFICE O/P EST MOD 30 MIN: CPT | Performed by: INTERNAL MEDICINE

## 2019-08-19 PROCEDURE — 85025 COMPLETE CBC W/AUTO DIFF WBC: CPT | Performed by: INTERNAL MEDICINE

## 2019-08-19 PROCEDURE — 86304 IMMUNOASSAY TUMOR CA 125: CPT | Performed by: INTERNAL MEDICINE

## 2019-08-21 ENCOUNTER — TELEPHONE (OUTPATIENT)
Dept: GASTROENTEROLOGY | Facility: CLINIC | Age: 39
End: 2019-08-21

## 2019-08-21 NOTE — TELEPHONE ENCOUNTER
Call to pt . [facility] has continued [facility] 9 mg as instructed with call of 8/14.  Swelling to feet and ankles as reported with call of 8/16 is worsening.  Has obtained support hose as instructed and elevating legs - using pillows to get higher than heart level at HS.      Swelling down somewhat by morning, but not gone.  Significantly worsening over course of day.  Describes as pitting edema.  Worse at feet/ankles but notes swelling up to knees.  Denies SOA, pain.      States Uceris and colestid are helping GI symptoms, but very concerned re: worsening edema.      As an fyi  - [facility] did use [employer] savings card and was able to obtain 28 day supply for $35.    Message to Dr Sales.

## 2019-08-21 NOTE — TELEPHONE ENCOUNTER
I called and spoke with her, she persists with significant lower extremity edema.  Cannot even put her shoes on.    I am going to have her wean off fairly quickly the budesonide by taking it every other day for the next few days, her last dose will be the 25th.  She is to continue to use the stockings and propping her legs up in the meantime    Continue colestipol, adjust the dose as needed for diarrhea

## 2019-08-21 NOTE — TELEPHONE ENCOUNTER
----- Message from Polina Womack sent at 8/21/2019  3:07 PM EDT -----  Regarding: Med Advice  Contact: [phone]  Patient experiencing issues with new medication. Wants to know how to proceed. Please call.

## 2019-08-26 NOTE — TELEPHONE ENCOUNTER
Call to pt.  States tapered off budesonide yesterday as instructed.  No improvement in lower extremity swelling.  Cannot wear shoes - at work with flip flops.  Persistent pitting to feet ankles - swollen to knees.  Denies calf pain.    States does not seem to be over colitis.  Loose stools yesterday.  4 formed stools with urgency already this morning.  Denies blood.  Intermittent generalized abd pain lasting a few minutes - ranks at 7-8 on pain scale.  Causes her to double over.      Taking colestipol as instructed - does help stool be more formed.      Advise that Dr Sales out of office this week - will send update to [place].  Advise if develops calf pain - go to ER.  Verb understanding.

## 2019-08-26 NOTE — TELEPHONE ENCOUNTER
Called pt and advised per Adrianna NP that she agrees she should go to the ER if the edema is worse since stopping the budesonide. Would continue to wear the stockings and elevated the legs .  Avoid salt . Drink more water.  Can increase the colestid to 2-3 daily to help with the loose stools fo rnow.  Advised pt to call back tomorrow .     Pt verb understanding and reports that since stopping the budesonide the swelling has not changed.  She states it has not improved but also has not worsened. Pt reports that Dr Sales told her she could take more colestid and pt has been taking 3 pills bid for a total of 6 pills.  Advised pt will update Dr Sales.

## 2019-08-26 NOTE — TELEPHONE ENCOUNTER
I would agree she should go to the ER if the edema is worse since stopping the budesonide. Would continue to wear the stockings and elevate the legs. Avoid salt. Drink more water. Can increase the colestid to 2-3 daily to help with the loose stools for now. Have patient call back tomorrow with update.

## 2019-08-27 RX ORDER — ANASTROZOLE 1 MG/1
1 TABLET ORAL DAILY
Qty: 30 TABLET | Refills: 5 | Status: SHIPPED | OUTPATIENT
Start: 2019-08-27 | End: 2019-11-11

## 2019-09-04 ENCOUNTER — TELEPHONE (OUTPATIENT)
Dept: GASTROENTEROLOGY | Facility: CLINIC | Age: 39
End: 2019-09-04

## 2019-09-04 NOTE — TELEPHONE ENCOUNTER
She can try 2 caps of pepto bismol three times daily. Will discuss pros-cons of going back on the budesonide at her next appointment.

## 2019-09-04 NOTE — TELEPHONE ENCOUNTER
Called pt and pt reports that she stopped the uceris last Sunday and her swelling is now all gone.   She feels like she did not take enough of it.  She is having 4-5 stools per day.  She states they vary from formed to very loose.  She state things have improved but not better.   Pt does have appt on 09/12 with Dr Sales. Pt is concerned because she has a tripped planned for 10/05 to [email or web address] and feels like her symptoms will come back.  Advised will send message to Dr Sales.

## 2019-09-04 NOTE — TELEPHONE ENCOUNTER
----- Message from Ginny Donis sent at 9/4/2019  9:47 AM EDT -----  Regarding: meds  Contact: [phone]  Pt is calling to talk with a nurse regarding how medications are working.

## 2019-09-04 NOTE — TELEPHONE ENCOUNTER
Called pt and on identified vm advised per Dr Sales that she can try 2 caps of pepto bismol three times a day.  She will discuss pro and cons of going back on budesonide at her next appt. Advised pt to call with questions.

## 2019-09-12 ENCOUNTER — OFFICE VISIT (OUTPATIENT)
Dept: GASTROENTEROLOGY | Facility: CLINIC | Age: 39
End: 2019-09-12

## 2019-09-12 VITALS
TEMPERATURE: 98.1 F | WEIGHT: 103.2 LBS | BODY MASS INDEX: 20.26 KG/M2 | HEIGHT: 60 IN | SYSTOLIC BLOOD PRESSURE: 98 MMHG | DIASTOLIC BLOOD PRESSURE: 64 MMHG

## 2019-09-12 DIAGNOSIS — K52.832 LYMPHOCYTIC COLITIS: ICD-10-CM

## 2019-09-12 DIAGNOSIS — R74.01 ELEVATED TRANSAMINASE LEVEL: ICD-10-CM

## 2019-09-12 DIAGNOSIS — R19.7 DIARRHEA DUE TO MALABSORPTION: ICD-10-CM

## 2019-09-12 DIAGNOSIS — K90.9 DIARRHEA DUE TO MALABSORPTION: ICD-10-CM

## 2019-09-12 DIAGNOSIS — K76.0 FATTY LIVER: Primary | ICD-10-CM

## 2019-09-12 PROCEDURE — 99214 OFFICE O/P EST MOD 30 MIN: CPT | Performed by: INTERNAL MEDICINE

## 2019-09-12 RX ORDER — PREDNISONE 10 MG/1
20 TABLET ORAL DAILY
Qty: 60 TABLET | Refills: 0 | Status: SHIPPED | OUTPATIENT
Start: 2019-09-12 | End: 2019-11-05 | Stop reason: HOSPADM

## 2019-09-12 NOTE — PROGRESS NOTES
"Chief Complaint   Patient presents with   • Follow-up   • Diarrhea   • Medication Problem     Subjective     HPI  Sravani Sky is a 39 y.o. female who presents for follow-up of diarrhea, elevated transaminase levels, microscopic colitis.  Her microscopic colitis was diagnosed on colonoscopy August 12.  She was prescribed budesonide which did help her symptoms.  However she had side effects of ankle and leg swelling and subsequently stopped the medication.  Reports the swelling was quite pronounced and rapidly progressing.  Symptoms resolved when she was off the budesonide for about 2 days.    Now back to diarrhea, abdominal cramping.  Diarrhea getting back to the point of having urgency and needing to get to the bathroom very quickly she is worried because in a few weeks, she has a family vacation planned.    She does report that with her chemotherapy for breast cancer, she took steroids and it caused her to be quite \"wired\" but she did not have any lower extremity swelling to her recollection.    Past Medical History:   Diagnosis Date   • Cancer (CMS/HCC)     Bilateral breast cancer; diagnosed 12/2013   • Diarrhea     x3 weeks   • Heart burn    • History of radiation therapy    • Lymphedema     Right hand and wrist       Social History     Socioeconomic History   • Marital status:      Spouse name: En   • Number of children: 2   • Years of education: College   • Highest education level: Not on file   Occupational History   • Occupation: Psychologist     Employer: [employer]   Tobacco Use   • Smoking status: Never Smoker   • Smokeless tobacco: Never Used   Substance and Sexual Activity   • Alcohol use: No   • Drug use: No   • Sexual activity: Defer         Current Outpatient Medications:   •  anastrozole (ARIMIDEX) 1 MG tablet, Take 1 tablet by mouth Daily., Disp: 30 tablet, Rfl: 5  •  Calcium Citrate-Vitamin D (CALCIUM + D PO), Take  by mouth., Disp: , Rfl:   •  cetirizine " (zyrTEC) 10 MG tablet, Take 10 mg by mouth Daily., Disp: , Rfl:   •  colestipol (COLESTID) 1 g tablet, Take 3 tablets by mouth Daily. Can increase to twice a day if needed, Disp: 160 tablet, Rfl: 1  •  hydrocortisone (ANUSOL-HC) 2.5 % rectal cream, Insert  into the rectum 2 (Two) Times a Day., Disp: 30 each, Rfl: 3  •  ibandronate (BONIVA) 150 MG tablet, Take 1 tablet by mouth Every 30 (Thirty) Days., Disp: 4 tablet, Rfl: 3  •  MULTIPLE VITAMINS PO, Take 1 tablet by mouth Daily., Disp: , Rfl:   •  venlafaxine XR (EFFEXOR-XR) 37.5 MG 24 hr capsule, TAKE ONE CAPSULE BY MOUTH DAILY, Disp: 30 capsule, Rfl: 5  •  Budesonide ER 9 MG tablet sustained-release 24 hour, Take 1 tablet by mouth Daily. Daily for 4 weeks, then every other day for 2 weeks., Disp: 40 tablet, Rfl: 0  •  cholestyramine (QUESTRAN) 4 GM/DOSE powder, Take 1 packet by mouth 2 (Two) Times a Day. mixed with a liquid, Disp: 378 g, Rfl: 3  •  potassium chloride (K-DUR,KLOR-CON) 20 MEQ tablet controlled-release ER tablet, Take 1 tablet by mouth Daily., Disp: 30 tablet, Rfl: 0  •  predniSONE (DELTASONE) 10 MG tablet, Take 2 tablets by mouth Daily. Take 2 tabs daily for 2 weeks, then 1 tab daily for 2 weeks and stop, Disp: 60 tablet, Rfl: 0    Review of Systems   Constitutional: Negative for activity change, appetite change, chills and fever.   HENT: Negative for trouble swallowing.    Respiratory: Negative.    Cardiovascular: Positive for leg swelling. Negative for chest pain.   Gastrointestinal: Positive for diarrhea. Negative for abdominal distention, abdominal pain, anal bleeding, constipation, nausea and vomiting.   Genitourinary: Negative for dysuria, frequency and hematuria.       Objective   Vitals:    09/12/19 0841   BP: 98/64   Temp: 98.1 °F (36.7 °C)         09/12/19  0841   Weight: 46.8 kg (103 lb 3.2 oz)     Body mass index is 20.15 kg/m².      Physical Exam   Constitutional: She is oriented to person, place, and time. She appears well-developed  and well-nourished. No distress.   HENT:   Head: Normocephalic and atraumatic.   Right Ear: External ear normal.   Left Ear: External ear normal.   Nose: Nose normal.   Mouth/Throat: Oropharynx is clear and moist.   Eyes: Conjunctivae and EOM are normal. Right eye exhibits no discharge. Left eye exhibits no discharge. No scleral icterus.   Neck: Normal range of motion. Neck supple. No thyromegaly present.   No supraclavicular adenopathy   Cardiovascular: Normal rate, regular rhythm, normal heart sounds and intact distal pulses. Exam reveals no gallop.   No murmur heard.  No lower extremity edema   Pulmonary/Chest: Effort normal and breath sounds normal. No respiratory distress. She has no wheezes.   Abdominal: Soft. Normal appearance and bowel sounds are normal. She exhibits no distension and no mass. There is no hepatosplenomegaly. There is no tenderness. There is no rigidity, no rebound and no guarding. No hernia.   Genitourinary:   Genitourinary Comments: Rectal exam deferred   Musculoskeletal: Normal range of motion. She exhibits no edema or tenderness.   No atrophy of upper or lower extremities.  Normal digits and nails of both hands.   Lymphadenopathy:     She has no cervical adenopathy.   Neurological: She is alert and oriented to person, place, and time. She displays no atrophy. Coordination normal.   Skin: Skin is warm and dry. No rash noted. She is not diaphoretic. No erythema.   Psychiatric: She has a normal mood and affect. Her behavior is normal. Judgment and thought content normal.   Vitals reviewed.      WBC   Date Value Ref Range Status   08/19/2019 5.47 3.40 - 10.80 10*3/mm3 Final     RBC   Date Value Ref Range Status   08/19/2019 4.19 3.77 - 5.28 10*6/mm3 Final     Hemoglobin   Date Value Ref Range Status   08/19/2019 11.4 (L) 12.0 - 15.9 g/dL Final     Hematocrit   Date Value Ref Range Status   08/19/2019 35.1 34.0 - 46.6 % Final     MCV   Date Value Ref Range Status   08/19/2019 83.8 79.0 - 97.0  fL Final     MCH   Date Value Ref Range Status   08/19/2019 27.2 26.6 - 33.0 pg Final     MCHC   Date Value Ref Range Status   08/19/2019 32.5 31.5 - 35.7 g/dL Final     RDW   Date Value Ref Range Status   08/19/2019 16.4 (H) 12.3 - 15.4 % Final     RDW-SD   Date Value Ref Range Status   08/19/2019 50.6 37.0 - 54.0 fl Final     MPV   Date Value Ref Range Status   08/19/2019 9.5 6.0 - 12.0 fL Final     Platelets   Date Value Ref Range Status   08/19/2019 243 140 - 450 10*3/mm3 Final     Neutrophil %   Date Value Ref Range Status   08/19/2019 67.9 42.7 - 76.0 % Final     Lymphocyte %   Date Value Ref Range Status   08/19/2019 20.7 19.6 - 45.3 % Final     Monocyte %   Date Value Ref Range Status   08/19/2019 9.9 5.0 - 12.0 % Final     Eosinophil %   Date Value Ref Range Status   08/19/2019 0.7 0.3 - 6.2 % Final     Basophil %   Date Value Ref Range Status   08/19/2019 0.4 0.0 - 1.5 % Final     Immature Grans %   Date Value Ref Range Status   08/19/2019 0.4 0.0 - 0.5 % Final     Neutrophils, Absolute   Date Value Ref Range Status   08/19/2019 3.72 1.70 - 7.00 10*3/mm3 Final     Lymphocytes, Absolute   Date Value Ref Range Status   08/19/2019 1.13 0.70 - 3.10 10*3/mm3 Final     Monocytes, Absolute   Date Value Ref Range Status   08/19/2019 0.54 0.10 - 0.90 10*3/mm3 Final     Eosinophils, Absolute   Date Value Ref Range Status   08/19/2019 0.04 0.00 - 0.40 10*3/mm3 Final     Basophils, Absolute   Date Value Ref Range Status   08/19/2019 0.02 0.00 - 0.20 10*3/mm3 Final     Immature Grans, Absolute   Date Value Ref Range Status   08/19/2019 0.02 0.00 - 0.05 10*3/mm3 Final     nRBC   Date Value Ref Range Status   08/19/2019 0.0 0.0 - 0.2 /100 WBC Final       Lab Results   Component Value Date    GLUCOSE 95 08/19/2019    BUN 14 08/19/2019    CREATININE 0.40 (L) 08/19/2019    EGFRIFNONA >150 08/19/2019    BCR 35.0 (H) 08/19/2019    CO2 29.8 (H) 08/19/2019    CALCIUM 8.2 (L) 08/19/2019    ALBUMIN 2.80 (L) 08/19/2019    AST 49  (H) 08/19/2019    ALT 52 (H) 08/19/2019         CT CHEST, ABDOMEN, AND PELVIS WITH IV CONTRAST     HISTORY: 39-year-old female with bilateral mastectomies for bilateral  breast cancer. BRCA 1 positive. Evaluate for metastatic disease.     TECHNIQUE: Radiation dose reduction techniques were utilized, including  automated exposure control and exposure modulation based on body size.   3 mm images were obtained through the chest, abdomen, and pelvis after  the administration of IV contrast. Compared with previous chest CT from  04/16/2015 and previous CTs from 01/30/2015.     FINDINGS:  CHEST: There is no axillary or subpectoral lymphadenopathy. There is no  lymphadenopathy within the chest. There are no new or suspicious  pulmonary opacities and there are no pleural or pericardial effusions.  There is stable mild radiation pneumonitis and scarring at the anterior  aspects of the right upper and middle lobes. There is no suspicious bone  lesion.     ABDOMEN/PELVIS: There is a moderate degree of fatty infiltration of the  liver. No suspicious liver lesion is seen. The gallbladder is  contracted. The spleen, pancreas, adrenals, and kidneys appear  unremarkable. There is formed stool within the rectum, but there is  otherwise a paucity of formed stool within the colon. No definitive  bowel thickening is seen. The uterus is surgically absent. Adnexa appear  unremarkable. There is no free fluid or lymphadenopathy.     IMPRESSION:  1. There is no convincing evidence for metastatic disease.  2. Paucity of formed stool within the colon. There is no colonic  thickening to suggest colitis.  3. Hepatic steatosis.     This report was finalized on 8/19/2019 9:52 AM by Dr. Zee Ospina M.D.      Assessment/Plan    Diarrhea: Recurring after only a short course of budesonide, due to her microscopic colitis    Lymphocytic colitis    Elevated transaminase level, fatty liver disease:?  Related to her chemotherapy (tamoxifen in  2015)    Plan  We discussed our options.  Antidiarrheals have not been very effective for her and are only symptom control.  She is going to need some kind of therapy to try to settle down the disease.  Offered options of resuming the budesonide with more aggressive control of lower extremity swelling with JOHN stockings versus every other day budesonide versus a trial of prednisone    At this point she is willing to try the prednisone    We will have her take measures to limit lower extremity swelling with stockings as well as propping her legs out.  Her blood pressure is too low to tolerate a diuretic.    Follow liver enzymes, she is going to need a serologic work-up at some point    Sravani was seen today for follow-up, diarrhea and medication problem.    Diagnoses and all orders for this visit:    Fatty liver    Diarrhea due to malabsorption    Lymphocytic colitis    Elevated transaminase level    Other orders  -     predniSONE (DELTASONE) 10 MG tablet; Take 2 tablets by mouth Daily. Take 2 tabs daily for 2 weeks, then 1 tab daily for 2 weeks and stop        Dictated utilizing Dragon dictation

## 2019-10-28 ENCOUNTER — APPOINTMENT (OUTPATIENT)
Dept: PREADMISSION TESTING | Facility: HOSPITAL | Age: 39
End: 2019-10-28

## 2019-10-28 VITALS
RESPIRATION RATE: 20 BRPM | OXYGEN SATURATION: 100 % | SYSTOLIC BLOOD PRESSURE: 111 MMHG | HEART RATE: 77 BPM | BODY MASS INDEX: 21.79 KG/M2 | WEIGHT: 111 LBS | DIASTOLIC BLOOD PRESSURE: 80 MMHG | TEMPERATURE: 97.6 F | HEIGHT: 60 IN

## 2019-10-28 LAB
ANION GAP SERPL CALCULATED.3IONS-SCNC: 10.3 MMOL/L (ref 5–15)
BUN BLD-MCNC: 12 MG/DL (ref 6–20)
BUN/CREAT SERPL: 35.3 (ref 7–25)
CALCIUM SPEC-SCNC: 8.1 MG/DL (ref 8.6–10.5)
CHLORIDE SERPL-SCNC: 104 MMOL/L (ref 98–107)
CO2 SERPL-SCNC: 23.7 MMOL/L (ref 22–29)
CREAT BLD-MCNC: 0.34 MG/DL (ref 0.57–1)
DEPRECATED RDW RBC AUTO: 41.9 FL (ref 37–54)
ERYTHROCYTE [DISTWIDTH] IN BLOOD BY AUTOMATED COUNT: 13.7 % (ref 12.3–15.4)
GFR SERPL CREATININE-BSD FRML MDRD: >150 ML/MIN/1.73
GLUCOSE BLD-MCNC: 82 MG/DL (ref 65–99)
HCT VFR BLD AUTO: 34.5 % (ref 34–46.6)
HGB BLD-MCNC: 11.4 G/DL (ref 12–15.9)
MCH RBC QN AUTO: 28.5 PG (ref 26.6–33)
MCHC RBC AUTO-ENTMCNC: 33 G/DL (ref 31.5–35.7)
MCV RBC AUTO: 86.3 FL (ref 79–97)
PLATELET # BLD AUTO: 334 10*3/MM3 (ref 140–450)
PMV BLD AUTO: 9.5 FL (ref 6–12)
POTASSIUM BLD-SCNC: 4.7 MMOL/L (ref 3.5–5.2)
RBC # BLD AUTO: 4 10*6/MM3 (ref 3.77–5.28)
SODIUM BLD-SCNC: 138 MMOL/L (ref 136–145)
WBC NRBC COR # BLD: 4.77 10*3/MM3 (ref 3.4–10.8)

## 2019-10-28 PROCEDURE — 80048 BASIC METABOLIC PNL TOTAL CA: CPT | Performed by: SURGERY

## 2019-10-28 PROCEDURE — 85027 COMPLETE CBC AUTOMATED: CPT | Performed by: SURGERY

## 2019-10-28 PROCEDURE — 36415 COLL VENOUS BLD VENIPUNCTURE: CPT

## 2019-10-28 RX ORDER — VENLAFAXINE HYDROCHLORIDE 37.5 MG/1
37.5 CAPSULE, EXTENDED RELEASE ORAL EVERY MORNING
COMMUNITY
End: 2019-11-05 | Stop reason: HOSPADM

## 2019-11-05 ENCOUNTER — ANESTHESIA (OUTPATIENT)
Dept: PERIOP | Facility: HOSPITAL | Age: 39
End: 2019-11-05

## 2019-11-05 ENCOUNTER — HOSPITAL ENCOUNTER (OUTPATIENT)
Facility: HOSPITAL | Age: 39
Setting detail: HOSPITAL OUTPATIENT SURGERY
Discharge: HOME | End: 2019-11-05
Attending: SURGERY | Admitting: SURGERY

## 2019-11-05 ENCOUNTER — ANESTHESIA EVENT (OUTPATIENT)
Dept: PERIOP | Facility: HOSPITAL | Age: 39
End: 2019-11-05

## 2019-11-05 VITALS
HEIGHT: 60 IN | RESPIRATION RATE: 16 BRPM | HEART RATE: 87 BPM | TEMPERATURE: 99 F | BODY MASS INDEX: 21.68 KG/M2 | SYSTOLIC BLOOD PRESSURE: 102 MMHG | DIASTOLIC BLOOD PRESSURE: 78 MMHG | OXYGEN SATURATION: 99 % | WEIGHT: 110.45 LBS

## 2019-11-05 PROCEDURE — 25010000002 ROPIVACAINE PER 1 MG: Performed by: SURGERY

## 2019-11-05 PROCEDURE — 25010000002 DEXAMETHASONE PER 1 MG: Performed by: SURGERY

## 2019-11-05 PROCEDURE — 25010000002 GENTAMICIN PER 80 MG: Performed by: SURGERY

## 2019-11-05 PROCEDURE — 25010000002 FENTANYL CITRATE (PF) 100 MCG/2ML SOLUTION: Performed by: REGISTERED NURSE

## 2019-11-05 PROCEDURE — 25010000002 NEOSTIGMINE PER 0.5 MG: Performed by: REGISTERED NURSE

## 2019-11-05 PROCEDURE — 25010000002 ONDANSETRON PER 1 MG: Performed by: REGISTERED NURSE

## 2019-11-05 PROCEDURE — 25010000002 MIDAZOLAM PER 1 MG: Performed by: ANESTHESIOLOGY

## 2019-11-05 PROCEDURE — 25010000002 PROPOFOL 10 MG/ML EMULSION: Performed by: REGISTERED NURSE

## 2019-11-05 PROCEDURE — 25010000003 CEFAZOLIN PER 500 MG: Performed by: SURGERY

## 2019-11-05 PROCEDURE — 25010000002 EPINEPHRINE PER 0.1 MG: Performed by: SURGERY

## 2019-11-05 RX ORDER — ONDANSETRON 2 MG/ML
4 INJECTION INTRAMUSCULAR; INTRAVENOUS ONCE AS NEEDED
Status: DISCONTINUED | OUTPATIENT
Start: 2019-11-05 | End: 2019-11-05 | Stop reason: HOSPADM

## 2019-11-05 RX ORDER — PROMETHAZINE HYDROCHLORIDE 25 MG/1
25 TABLET ORAL ONCE AS NEEDED
Status: DISCONTINUED | OUTPATIENT
Start: 2019-11-05 | End: 2019-11-05 | Stop reason: HOSPADM

## 2019-11-05 RX ORDER — FAMOTIDINE 10 MG/ML
20 INJECTION, SOLUTION INTRAVENOUS ONCE
Status: COMPLETED | OUTPATIENT
Start: 2019-11-05 | End: 2019-11-05

## 2019-11-05 RX ORDER — DIPHENHYDRAMINE HYDROCHLORIDE 50 MG/ML
12.5 INJECTION INTRAMUSCULAR; INTRAVENOUS
Status: DISCONTINUED | OUTPATIENT
Start: 2019-11-05 | End: 2019-11-05 | Stop reason: HOSPADM

## 2019-11-05 RX ORDER — HYDROCODONE BITARTRATE AND ACETAMINOPHEN 7.5; 325 MG/1; MG/1
1 TABLET ORAL ONCE AS NEEDED
Status: DISCONTINUED | OUTPATIENT
Start: 2019-11-05 | End: 2019-11-05 | Stop reason: HOSPADM

## 2019-11-05 RX ORDER — GINSENG 100 MG
CAPSULE ORAL AS NEEDED
Status: DISCONTINUED | OUTPATIENT
Start: 2019-11-05 | End: 2019-11-05 | Stop reason: HOSPADM

## 2019-11-05 RX ORDER — HYDROMORPHONE HYDROCHLORIDE 1 MG/ML
0.25 INJECTION, SOLUTION INTRAMUSCULAR; INTRAVENOUS; SUBCUTANEOUS
Status: DISCONTINUED | OUTPATIENT
Start: 2019-11-05 | End: 2019-11-05 | Stop reason: HOSPADM

## 2019-11-05 RX ORDER — PROPOFOL 10 MG/ML
VIAL (ML) INTRAVENOUS AS NEEDED
Status: DISCONTINUED | OUTPATIENT
Start: 2019-11-05 | End: 2019-11-05 | Stop reason: SURG

## 2019-11-05 RX ORDER — FENTANYL CITRATE 50 UG/ML
50 INJECTION, SOLUTION INTRAMUSCULAR; INTRAVENOUS
Status: DISCONTINUED | OUTPATIENT
Start: 2019-11-05 | End: 2019-11-05 | Stop reason: HOSPADM

## 2019-11-05 RX ORDER — CELECOXIB 200 MG/1
200 CAPSULE ORAL ONCE
Status: COMPLETED | OUTPATIENT
Start: 2019-11-05 | End: 2019-11-05

## 2019-11-05 RX ORDER — HYDROXYZINE PAMOATE 50 MG/1
50 CAPSULE ORAL ONCE
Status: COMPLETED | OUTPATIENT
Start: 2019-11-05 | End: 2019-11-05

## 2019-11-05 RX ORDER — PROMETHAZINE HYDROCHLORIDE 25 MG/1
12.5 TABLET ORAL ONCE AS NEEDED
Status: DISCONTINUED | OUTPATIENT
Start: 2019-11-05 | End: 2019-11-05 | Stop reason: HOSPADM

## 2019-11-05 RX ORDER — ONDANSETRON 2 MG/ML
INJECTION INTRAMUSCULAR; INTRAVENOUS AS NEEDED
Status: DISCONTINUED | OUTPATIENT
Start: 2019-11-05 | End: 2019-11-05 | Stop reason: SURG

## 2019-11-05 RX ORDER — DOCUSATE SODIUM 100 MG/1
100 CAPSULE, LIQUID FILLED ORAL ONCE
Status: COMPLETED | OUTPATIENT
Start: 2019-11-05 | End: 2019-11-05

## 2019-11-05 RX ORDER — HYDROMORPHONE HYDROCHLORIDE 1 MG/ML
0.5 INJECTION, SOLUTION INTRAMUSCULAR; INTRAVENOUS; SUBCUTANEOUS
Status: DISCONTINUED | OUTPATIENT
Start: 2019-11-05 | End: 2019-11-05 | Stop reason: HOSPADM

## 2019-11-05 RX ORDER — GLYCOPYRROLATE 0.2 MG/ML
INJECTION INTRAMUSCULAR; INTRAVENOUS AS NEEDED
Status: DISCONTINUED | OUTPATIENT
Start: 2019-11-05 | End: 2019-11-05 | Stop reason: SURG

## 2019-11-05 RX ORDER — SODIUM CHLORIDE, SODIUM LACTATE, POTASSIUM CHLORIDE, AND CALCIUM CHLORIDE .6; .31; .03; .02 G/100ML; G/100ML; G/100ML; G/100ML
INJECTION, SOLUTION INTRAVENOUS AS NEEDED
Status: DISCONTINUED | OUTPATIENT
Start: 2019-11-05 | End: 2019-11-05 | Stop reason: HOSPADM

## 2019-11-05 RX ORDER — LIDOCAINE HYDROCHLORIDE 20 MG/ML
INJECTION, SOLUTION INFILTRATION; PERINEURAL AS NEEDED
Status: DISCONTINUED | OUTPATIENT
Start: 2019-11-05 | End: 2019-11-05 | Stop reason: SURG

## 2019-11-05 RX ORDER — DEXMEDETOMIDINE HYDROCHLORIDE 100 UG/ML
INJECTION, SOLUTION INTRAVENOUS AS NEEDED
Status: DISCONTINUED | OUTPATIENT
Start: 2019-11-05 | End: 2019-11-05 | Stop reason: SURG

## 2019-11-05 RX ORDER — CLINDAMYCIN PHOSPHATE 900 MG/50ML
900 INJECTION INTRAVENOUS ONCE
Status: COMPLETED | OUTPATIENT
Start: 2019-11-05 | End: 2019-11-05

## 2019-11-05 RX ORDER — PROMETHAZINE HYDROCHLORIDE 25 MG/ML
6.25 INJECTION, SOLUTION INTRAMUSCULAR; INTRAVENOUS
Status: DISCONTINUED | OUTPATIENT
Start: 2019-11-05 | End: 2019-11-05 | Stop reason: HOSPADM

## 2019-11-05 RX ORDER — PROMETHAZINE HYDROCHLORIDE 25 MG/ML
12.5 INJECTION, SOLUTION INTRAMUSCULAR; INTRAVENOUS ONCE AS NEEDED
Status: DISCONTINUED | OUTPATIENT
Start: 2019-11-05 | End: 2019-11-05 | Stop reason: HOSPADM

## 2019-11-05 RX ORDER — ACETAMINOPHEN 325 MG/1
975 TABLET ORAL ONCE
Status: COMPLETED | OUTPATIENT
Start: 2019-11-05 | End: 2019-11-05

## 2019-11-05 RX ORDER — SODIUM CHLORIDE, SODIUM LACTATE, POTASSIUM CHLORIDE, CALCIUM CHLORIDE 600; 310; 30; 20 MG/100ML; MG/100ML; MG/100ML; MG/100ML
9 INJECTION, SOLUTION INTRAVENOUS CONTINUOUS
Status: DISCONTINUED | OUTPATIENT
Start: 2019-11-05 | End: 2019-11-05 | Stop reason: HOSPADM

## 2019-11-05 RX ORDER — SCOLOPAMINE TRANSDERMAL SYSTEM 1 MG/1
1 PATCH, EXTENDED RELEASE TRANSDERMAL ONCE
Status: DISCONTINUED | OUTPATIENT
Start: 2019-11-05 | End: 2019-11-05 | Stop reason: HOSPADM

## 2019-11-05 RX ORDER — PROMETHAZINE HYDROCHLORIDE 25 MG/1
25 SUPPOSITORY RECTAL ONCE AS NEEDED
Status: DISCONTINUED | OUTPATIENT
Start: 2019-11-05 | End: 2019-11-05 | Stop reason: HOSPADM

## 2019-11-05 RX ORDER — FLUMAZENIL 0.1 MG/ML
0.2 INJECTION INTRAVENOUS AS NEEDED
Status: DISCONTINUED | OUTPATIENT
Start: 2019-11-05 | End: 2019-11-05 | Stop reason: HOSPADM

## 2019-11-05 RX ORDER — OXYCODONE AND ACETAMINOPHEN 7.5; 325 MG/1; MG/1
1 TABLET ORAL ONCE AS NEEDED
Status: COMPLETED | OUTPATIENT
Start: 2019-11-05 | End: 2019-11-05

## 2019-11-05 RX ORDER — ROCURONIUM BROMIDE 10 MG/ML
INJECTION, SOLUTION INTRAVENOUS AS NEEDED
Status: DISCONTINUED | OUTPATIENT
Start: 2019-11-05 | End: 2019-11-05 | Stop reason: SURG

## 2019-11-05 RX ORDER — MIDAZOLAM HYDROCHLORIDE 1 MG/ML
2 INJECTION INTRAMUSCULAR; INTRAVENOUS
Status: DISCONTINUED | OUTPATIENT
Start: 2019-11-05 | End: 2019-11-05 | Stop reason: HOSPADM

## 2019-11-05 RX ORDER — DIPHENHYDRAMINE HCL 25 MG
25 CAPSULE ORAL
Status: DISCONTINUED | OUTPATIENT
Start: 2019-11-05 | End: 2019-11-05 | Stop reason: HOSPADM

## 2019-11-05 RX ORDER — MIDAZOLAM HYDROCHLORIDE 1 MG/ML
1 INJECTION INTRAMUSCULAR; INTRAVENOUS
Status: DISCONTINUED | OUTPATIENT
Start: 2019-11-05 | End: 2019-11-05 | Stop reason: HOSPADM

## 2019-11-05 RX ORDER — HYDRALAZINE HYDROCHLORIDE 20 MG/ML
5 INJECTION INTRAMUSCULAR; INTRAVENOUS
Status: DISCONTINUED | OUTPATIENT
Start: 2019-11-05 | End: 2019-11-05 | Stop reason: HOSPADM

## 2019-11-05 RX ORDER — ONDANSETRON 4 MG/1
4 TABLET, FILM COATED ORAL ONCE AS NEEDED
Status: DISCONTINUED | OUTPATIENT
Start: 2019-11-05 | End: 2019-11-05 | Stop reason: HOSPADM

## 2019-11-05 RX ORDER — DEXAMETHASONE SODIUM PHOSPHATE 4 MG/ML
8 INJECTION, SOLUTION INTRA-ARTICULAR; INTRALESIONAL; INTRAMUSCULAR; INTRAVENOUS; SOFT TISSUE ONCE
Status: COMPLETED | OUTPATIENT
Start: 2019-11-05 | End: 2019-11-05

## 2019-11-05 RX ORDER — SODIUM CHLORIDE 0.9 % (FLUSH) 0.9 %
3 SYRINGE (ML) INJECTION EVERY 12 HOURS SCHEDULED
Status: DISCONTINUED | OUTPATIENT
Start: 2019-11-05 | End: 2019-11-05 | Stop reason: HOSPADM

## 2019-11-05 RX ORDER — CELECOXIB 200 MG/1
400 CAPSULE ORAL ONCE
Status: COMPLETED | OUTPATIENT
Start: 2019-11-05 | End: 2019-11-05

## 2019-11-05 RX ORDER — FENTANYL CITRATE 50 UG/ML
INJECTION, SOLUTION INTRAMUSCULAR; INTRAVENOUS AS NEEDED
Status: DISCONTINUED | OUTPATIENT
Start: 2019-11-05 | End: 2019-11-05 | Stop reason: SURG

## 2019-11-05 RX ORDER — LABETALOL HYDROCHLORIDE 5 MG/ML
5 INJECTION, SOLUTION INTRAVENOUS
Status: DISCONTINUED | OUTPATIENT
Start: 2019-11-05 | End: 2019-11-05 | Stop reason: HOSPADM

## 2019-11-05 RX ORDER — SODIUM CHLORIDE 0.9 % (FLUSH) 0.9 %
3-10 SYRINGE (ML) INJECTION AS NEEDED
Status: DISCONTINUED | OUTPATIENT
Start: 2019-11-05 | End: 2019-11-05 | Stop reason: HOSPADM

## 2019-11-05 RX ORDER — ACETAMINOPHEN 325 MG/1
650 TABLET ORAL ONCE AS NEEDED
Status: DISCONTINUED | OUTPATIENT
Start: 2019-11-05 | End: 2019-11-05 | Stop reason: HOSPADM

## 2019-11-05 RX ORDER — NALOXONE HCL 0.4 MG/ML
0.2 VIAL (ML) INJECTION AS NEEDED
Status: DISCONTINUED | OUTPATIENT
Start: 2019-11-05 | End: 2019-11-05 | Stop reason: HOSPADM

## 2019-11-05 RX ORDER — EPHEDRINE SULFATE 50 MG/ML
5 INJECTION, SOLUTION INTRAVENOUS ONCE AS NEEDED
Status: DISCONTINUED | OUTPATIENT
Start: 2019-11-05 | End: 2019-11-05 | Stop reason: HOSPADM

## 2019-11-05 RX ORDER — CYCLOBENZAPRINE HCL 10 MG
10 TABLET ORAL ONCE
Status: COMPLETED | OUTPATIENT
Start: 2019-11-05 | End: 2019-11-05

## 2019-11-05 RX ORDER — OXYCODONE HYDROCHLORIDE AND ACETAMINOPHEN 5; 325 MG/1; MG/1
2 TABLET ORAL ONCE AS NEEDED
Status: DISCONTINUED | OUTPATIENT
Start: 2019-11-05 | End: 2019-11-05 | Stop reason: HOSPADM

## 2019-11-05 RX ADMIN — PROPOFOL 100 MG: 10 INJECTION, EMULSION INTRAVENOUS at 07:42

## 2019-11-05 RX ADMIN — MIDAZOLAM 1 MG: 1 INJECTION INTRAMUSCULAR; INTRAVENOUS at 07:04

## 2019-11-05 RX ADMIN — ACETAMINOPHEN 975 MG: 325 TABLET, FILM COATED ORAL at 06:23

## 2019-11-05 RX ADMIN — DEXMEDETOMIDINE HYDROCHLORIDE 4 MCG: 100 INJECTION, SOLUTION, CONCENTRATE INTRAVENOUS at 08:30

## 2019-11-05 RX ADMIN — HYDROXYZINE PAMOATE 50 MG: 50 CAPSULE ORAL at 06:24

## 2019-11-05 RX ADMIN — GLYCOPYRROLATE 0.5 MG: 0.2 INJECTION INTRAMUSCULAR; INTRAVENOUS at 09:09

## 2019-11-05 RX ADMIN — OXYCODONE HYDROCHLORIDE AND ACETAMINOPHEN 1 TABLET: 7.5; 325 TABLET ORAL at 10:59

## 2019-11-05 RX ADMIN — SODIUM CHLORIDE, POTASSIUM CHLORIDE, SODIUM LACTATE AND CALCIUM CHLORIDE 9 ML/HR: 600; 310; 30; 20 INJECTION, SOLUTION INTRAVENOUS at 07:03

## 2019-11-05 RX ADMIN — DEXMEDETOMIDINE HYDROCHLORIDE 4 MCG: 100 INJECTION, SOLUTION, CONCENTRATE INTRAVENOUS at 09:14

## 2019-11-05 RX ADMIN — ONDANSETRON 4 MG: 2 INJECTION INTRAMUSCULAR; INTRAVENOUS at 09:14

## 2019-11-05 RX ADMIN — CELECOXIB 200 MG: 200 CAPSULE ORAL at 10:25

## 2019-11-05 RX ADMIN — DEXMEDETOMIDINE HYDROCHLORIDE 4 MCG: 100 INJECTION, SOLUTION, CONCENTRATE INTRAVENOUS at 09:00

## 2019-11-05 RX ADMIN — CLINDAMYCIN PHOSPHATE 900 MG: 900 INJECTION INTRAVENOUS at 07:42

## 2019-11-05 RX ADMIN — FAMOTIDINE 20 MG: 10 INJECTION INTRAVENOUS at 07:04

## 2019-11-05 RX ADMIN — ROCURONIUM BROMIDE 10 MG: 10 INJECTION INTRAVENOUS at 07:50

## 2019-11-05 RX ADMIN — SODIUM CHLORIDE, POTASSIUM CHLORIDE, SODIUM LACTATE AND CALCIUM CHLORIDE: 600; 310; 30; 20 INJECTION, SOLUTION INTRAVENOUS at 09:09

## 2019-11-05 RX ADMIN — ROCURONIUM BROMIDE 20 MG: 10 INJECTION INTRAVENOUS at 08:29

## 2019-11-05 RX ADMIN — LIDOCAINE HYDROCHLORIDE 60 MG: 20 INJECTION, SOLUTION INFILTRATION; PERINEURAL at 07:42

## 2019-11-05 RX ADMIN — DEXMEDETOMIDINE HYDROCHLORIDE 8 MCG: 100 INJECTION, SOLUTION, CONCENTRATE INTRAVENOUS at 09:12

## 2019-11-05 RX ADMIN — DOCUSATE SODIUM 100 MG: 100 CAPSULE, LIQUID FILLED ORAL at 10:29

## 2019-11-05 RX ADMIN — DEXAMETHASONE SODIUM PHOSPHATE 8 MG: 4 INJECTION, SOLUTION INTRAMUSCULAR; INTRAVENOUS at 06:26

## 2019-11-05 RX ADMIN — DEXMEDETOMIDINE HYDROCHLORIDE 4 MCG: 100 INJECTION, SOLUTION, CONCENTRATE INTRAVENOUS at 08:15

## 2019-11-05 RX ADMIN — FENTANYL CITRATE 25 MCG: 50 INJECTION INTRAMUSCULAR; INTRAVENOUS at 09:12

## 2019-11-05 RX ADMIN — CELECOXIB 400 MG: 200 CAPSULE ORAL at 06:23

## 2019-11-05 RX ADMIN — SCOPALAMINE 1 PATCH: 1 PATCH, EXTENDED RELEASE TRANSDERMAL at 06:23

## 2019-11-05 RX ADMIN — FENTANYL CITRATE 50 MCG: 50 INJECTION INTRAMUSCULAR; INTRAVENOUS at 07:42

## 2019-11-05 RX ADMIN — CYCLOBENZAPRINE 10 MG: 10 TABLET, FILM COATED ORAL at 06:23

## 2019-11-05 RX ADMIN — DEXMEDETOMIDINE HYDROCHLORIDE 4 MCG: 100 INJECTION, SOLUTION, CONCENTRATE INTRAVENOUS at 08:45

## 2019-11-05 RX ADMIN — FENTANYL CITRATE 25 MCG: 50 INJECTION INTRAMUSCULAR; INTRAVENOUS at 09:14

## 2019-11-05 RX ADMIN — NEOSTIGMINE METHYLSULFATE 2.5 MG: 1 INJECTION INTRAMUSCULAR; INTRAVENOUS; SUBCUTANEOUS at 09:09

## 2019-11-05 RX ADMIN — ROCURONIUM BROMIDE 40 MG: 10 INJECTION INTRAVENOUS at 07:42

## 2019-11-05 RX ADMIN — DEXMEDETOMIDINE HYDROCHLORIDE 4 MCG: 100 INJECTION, SOLUTION, CONCENTRATE INTRAVENOUS at 08:00

## 2019-11-05 NOTE — DISCHARGE INSTRUCTIONS
**Refer to Dr. Arreola's post-op discharge instruction sheet.**    You were given the following medications in the hospital:    Tylenol at 06:23  Celebrex at 06:23 and 10:25  Flexeril at 06:23  Colace at 10:29  Percocet at 10:59        SEDATION DISCHARGE INSTRUCTIONS.    IMPORTANT: The following information will help you return to your best level of health.  GENERAL ANESTHESIA.  You have had general anesthesia. You were given a medicine to help you go to sleep and not feel pain.    Follow these instructions:   Go right home. Rest quietly at home today, then you can be up and about.   Do not drink alcohol, drive or operate machinery for 24 hours.   Do not make any important decisions or sign any legal papers in the next 24 hours.   Have a RESPONSIBLE PERSON stay with you the rest of today and overnight for your protection and safety.   Start your diet with fluids and light foods (jello, soup, juice, toast). Then eat a normal diet if not nauseated.    Call your doctor if you have:   any blue or gray skin color.   repeated vomiting.   trouble breathing.   any new problems or concerns.        Scopolamine Patch  This patch has been applied to the skin behind one of your ears.  It may stay in place up to 24 hours. You may remove it at any time after your surgery; however, it should be removed after you are up and walking around the next day.  This medicine reduces stomach upset. Side effects may include: dry mouth, dizziness, sleepiness, constipation, or upset stomach.  An allergy would show up as: a rash, itching, wheezing or shortness of breath.  Follow these instructions:  1. Do not drink alcohol, drive or operate machinery while taking this medicine.  2. Wear only 1 patch at a time. You can leave the patch on for up to 24 hours.  3. When you remove the patch, fold it in half with the sticky sides together and throw it away. Wash your hands and the area under the patch.  4. Do not touch your eye with your hand if it has  touched the patch.  5. Wash your hands well before and after touching the patch.  6. Sit or stand slowly to avoid dizziness.  Call your doctor if you have:  1. Any sign of allergy  2. No relief  3. Trouble passing urine  4. Any new or severe symptoms    **Please remove the patch behind your ear tonight or tomorrow.**

## 2019-11-05 NOTE — OP NOTE
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, superior pole and lateral mastectomy deformity, acquired absence of bilateral breasts, history of bilateral breast cancer, history of BRCA gene mutation  Postoperative diagnosis: Same  Procedure: Fat grafting to superior and lateral mastectomy deformity using Revolve, near circumferential and radial capsulotomies  Surgeon: Jacky Arreola MD  Assistant: Yoselin SHARPE CSA  Anesthesia General endotracheal  Estimated blood loss 25 cc  Complications none apparent  Drains x1  Indications for procedure this nice patient is undergone bilateral mastectomies she had bilateral cancer and a BRCA gene mutation she is developed capsular contracture on her right side which did have postoperative radiation.  She has a superior pole shelf deformity in the lateral deformity as well with very thin nature of the tissues overlying her pectoral muscle superiorly in the upper outer quadrant.  We plan release of her capsular contracture and fat grafting to the superior and lateral aspects of her mastectomy deformity.  She understands the risk benefits and complications.  She understands additional fat grafting could be needed she also understands the radiation changes to her chest are going to always lead to some tightening and complication in this reconstruction.  Is reviewed the informed consent in my office and agrees to proceed and had no further questions prior to surgery day and she is marked prior to surgery.  Procedure: The patient's brought to the operating room placed in the operating table supine position.  Satisfactory general endotracheal anesthesia is achieved without difficulty.  We injected dilute local anesthesia about her periumbilical abdominal subcutaneous tissues and around the periphery of her right reconstruction we allow this to work for 15 minutes while she was prepped and draped in a sterile fashion.  And using 3 small  incisions one at the umbilicus and to down along the waistline we used 3 mm 3-hole cannulas to suction the entirety of our premarked periumbilical fat pad of her abdomen and then using a 3 hole 4 mm cannula we also suctioned this again in a crossing fashion we obtained a total of 250 cc of aspirate we then washed this using the  revolve system.  Then using a Quintanilla type ring aspirating cannula we injected the fat and small aliquots through multiple crossing patterns from both medially and laterally into the superior and lateral aspects of her deformity a total of 60 cc of fat was injected with multiple passes distributing small amounts of fat with each pass.  We then made a small incision to her previous mastectomy incision and elevated skin and subcutaneous tissues off of the pectoral muscle and open the pectoral muscle in a muscle-splitting fashion we displaced the implant which was intact and in good shape with a lighted retractor and then using the R lighted retractor and a Bovie with extender we performed a near circumferential capsulotomy with radial capsulotomies.  We maintained excellent hemostasis using the Bovie cauterization and placed a 15 Urdu Juancarlos drain through a separate incision and secured this with a nylon suture we inspected all of our capsulotomies hemostasis was excellent and the pocket was greatly enlarged and improved and the implant was softer and moved much better with these capsulotomies.  We irrigated with the whole liter of our triple antibiotic and dilute Betadine containing saline as well as 50% Betadine and suctioned this away we then closed the incision the muscle layer and capsule with running 2-0 Vicryl 3-0 Vicryl subcutaneous layer 4-0 Vicryl in the deep dermis then a 5-0 PDS subcuticular suture was used to complete the layered closure we placed Dermabond across the incision gauze and Tegaderm also Biopatch gauze and Tegaderm at the drain site we closed the suction sites and  injection sites with 4-0 Prolene

## 2019-11-05 NOTE — PERIOPERATIVE NURSING NOTE
Page to Dr. Arreola through his office to inform of KATELYN drain output.  Await return phone call.

## 2019-11-05 NOTE — ANESTHESIA POSTPROCEDURE EVALUATION
Patient: Sravani Sky    Procedure Summary     Date:  11/05/19 Room / Location:  [facility] OR  / [facility] MAIN OR    Anesthesia Start:  0732 Anesthesia Stop:  0939    Procedures:       RIGHT FAT GRAFTING WITH REVOLVE (Right )      RIGHT CAPSULOTOMY (Right Breast) Diagnosis:      Surgeon:  MADELYN Arreola MD Provider:  Penelope Castle MD    Anesthesia Type:  general ASA Status:  1          Anesthesia Type: general  Last vitals  BP   97/65 (11/05/19 1040)   Temp   37.2 °C (99 °F) (11/05/19 0936)   Pulse   87 (11/05/19 1025)   Resp   16 (11/05/19 0940)     SpO2   100 % (11/05/19 1025)     Post Anesthesia Care and Evaluation    Patient location during evaluation: PACU  Patient participation: complete - patient participated  Level of consciousness: awake and alert  Pain management: adequate  Airway patency: patent  Anesthetic complications: No anesthetic complications    Cardiovascular status: acceptable  Respiratory status: acceptable  Hydration status: acceptable    Comments: --------------------            11/05/19               1040     --------------------   BP:       97/65      Pulse:               Resp:                Temp:                SpO2:               --------------------

## 2019-11-05 NOTE — ANESTHESIA PROCEDURE NOTES
Airway  Urgency: elective    Date/Time: 11/5/2019 7:46 AM  Airway not difficult    General Information and Staff    Patient location during procedure: OR  CRNA: Michelle John CRNA    Indications and Patient Condition  Indications for airway management: airway protection    Preoxygenated: yes  MILS maintained throughout  Mask difficulty assessment: 1 - vent by mask    Final Airway Details  Final airway type: endotracheal airway      Successful airway: ETT  Cuffed: yes   Successful intubation technique: direct laryngoscopy  Endotracheal tube insertion site: oral  Blade: Jocelyn  Blade size: 3  ETT size (mm): 7.0  Cormack-Lehane Classification: grade I - full view of glottis  Placement verified by: chest auscultation and capnometry   Measured from: lips  ETT/EBT  to lips (cm): 20  Number of attempts at approach: 1  Assessment: lips, teeth, and gum same as pre-op and atraumatic intubation    Additional Comments  Atraumatic insertion

## 2019-11-05 NOTE — ANESTHESIA PREPROCEDURE EVALUATION
Anesthesia Evaluation     history of anesthetic complications: prolonged sedation               Airway   Mallampati: I  TM distance: >3 FB  Neck ROM: full  Dental - normal exam     Pulmonary    (-) asthma, shortness of breath, recent URI  Cardiovascular     (-) hypertension, dysrhythmias, angina, hyperlipidemia      Neuro/Psych  (-) dizziness/light headedness, syncope  GI/Hepatic/Renal/Endo    (+)   liver disease fatty liver disease,     Musculoskeletal     Abdominal    Substance History      OB/GYN          Other      history of cancer                    Anesthesia Plan    ASA 1     general     intravenous induction   Anesthetic plan, all risks, benefits, and alternatives have been provided, discussed and informed consent has been obtained with: patient.

## 2019-11-08 NOTE — PROGRESS NOTES
Subjective      EASONS FOR FOLLOWUP:   1. [id number] triple negative right breast cancer.   2. T1Nx left breast cancer invasive ductal carcinoma, ER/UT positive, HER2 negative.   3. BRCA 1 positive.   4. Right internal mammary node positive on CT scan.   5. Neoadjuvant dose dense Adriamycin/Cytoxan, weekly Taxol started on 01/20/2014 with plan for axillary dissection on the right and sentinel node on left, bilateral mastectomies plus radiation to the right breast and internal mammary nodes.   6. Addition of carboplatin with weekly Taxol to start on 03/24/2014.   7. Carboplatin held on 05/05/2014 due to thrombocytopenia.   8. The patient was seen on 08/05/2014, postoperatively; bilateral mastectomies and sentinel nodes with a 5 mm residua l grade 3 tumor in the right breast with two negative sentinel nodes (cannot tell if the node with a clip was removed). Complete response in the left breast with two negative sentinel nodes. The decision was made to consider radiation because of her pret reatment internal mammary node on the right and tamoxifen therapy for the hormone positive breast cancer on right. Clinical trials are being looked into.   9. Additional surgery to remove the lymph node with the clip in the right axilla, 3 nodes removed and negative for metastatic disease. Tamoxifen started September 2014.   10. Radiation completed in October 2014. Tamoxifen on hold because of some neurological signs.   11. Not eligible for NSABP B-55 because of tamoxifen as of 10/20/2014.   12. Tamoxifen resumed on 11/18/2014 on every other day dose.   13. CT scans done for right neck discomfort with negative scans except for radiation scarring in the right lung apex.   14. Post total abdominal hysterectomy and BSO in 01/2015. Tamoxifen resumed with switch to an AI in 7/15  15. Lymphedema, right arm.                   16.  Iron deficiency anemia    History of Present Illness  patient is a 36-year-old white female with BRCA1  positivity and bilateral breast cancers currently on Arimidex for the last 4+ years after 1 year of tamoxifen.      Comes in for follow-up after 1 month diarrheal illness which is dragged her down and made her extremely weak and she has lost 12 pounds.  I was concerned enough to do CAT scans to make sure she has not had recurrence of her breast cancer in the meantime we referred her to GI and they did a colonoscopy and diagnosed lymphocytic colitis and gave her budesonide orally and then switch to prednisone and she is finally recovered and off her prednisone is gained back about 8 pounds.    She had some reconstruction on her right implant to help because of dimpling and was recovering from this    At this point we will reassess her bone density after 1 year of oral Boniva because if she has not improved I would switch her to tamoxifen for the osteoporosis that was noted last November and consider adding Prolia  I am also not sure if the Arimidex because some of her GI complaints and I think this is also enough reason to switch    In addition her ER positive left-sided tumor had a complete response to neoadjuvant chemo and therefore pushing Arimidex more than 5 years I do not think, is needed    Active Ambulatory Problems     Diagnosis Date Noted   • Malignant neoplasm of female breast (CMS/HCC) 04/15/2016   • BRCA1 genetic carrier 04/15/2016   • Anemia 08/04/2016   • Long term current use of aromatase inhibitor 08/04/2016   • Osteopenia 08/04/2016   • Diarrhea of presumed infectious origin 08/06/2019   • Elevated transaminase level 08/07/2019   • Fatty liver 08/19/2019   • Diarrhea due to malabsorption 09/12/2019   • Lymphocytic colitis 09/12/2019     Resolved Ambulatory Problems     Diagnosis Date Noted   • No Resolved Ambulatory Problems     Past Medical History:   Diagnosis Date   • Cancer (CMS/HCC)    • Diarrhea    • Heart burn    • History of radiation therapy    • Lymphedema    • Slow to wake up after  anesthesia      Past Surgical History:   Procedure Laterality Date   • BREAST AUGMENTATION Right 2019    Procedure: RIGHT CAPSULOTOMY;  Surgeon: MADELYN Arreola MD;  Location: [facility]  Service: Plastics   • BREAST IMPLANT SURGERY Left    • BREAST SURGERY Bilateral 2014    Mastectomy   • BREAST TISSUE EXPANDER REMOVAL INSERTION OF IMPLANT     •  SECTION  , 2010    X2   • COLONOSCOPY N/A 2019    Hypertrophied anal papilla, [facility]   • FAT GRAFTING Right 2019    Procedure: RIGHT FAT GRAFTING WITH REVOLVE;  Surgeon: MADELYN Arreola MD;  Location: [facility]  Service: Plastics   • HYSTERECTOMY      Total         OB/GYN HISTORY: She is  2, para 2. Menarche at age 10. First childbirth wa s at age 26. She breast-fed her second child for 6 weeks. She was on birth control until her first childbirth and then used a Mirena IUD which she just had removed 3 months ago.     HEMATOLOGIC/ONCOLOGIC HISTORY:   The patient was seen on 2014, having had bilateral mastectomies. Right breast had a 5 mm residual invasive tumor, grade 3, with clear margins, two negative sentinel nodes (I cannot tell if the lymph node with the clip was remove d and we will have to assess this). Left breast shows a complete pathological response with two negative sentinel nodes. The decision was made to review her for radiation therapy because of enlarged right internal mammary node, pretreatment, and to star t tamoxifen. She is going to have her ovaries removed later in the year when she has expanders put in and we can switch to an AI at that point. I am looking into clinical trials for BCRA positive patients with residual disease and for triple negative canc ers with residual disease but the 5 mm tumor may disqualify her.        The patient was seen on 2014 with additional surgery to remove the lymph node in her axilla with the clip in it which was thankfully benign in addition to 3 other nodes  which were nega tive. Radiation to the internal mammary chain planned plus tamoxifen for 5 years. The NSABP B55 study is expected to have an amendment including hormone positive patients and we will revisit this issue after her radiation. Her port has been removed.        NSA BP amendment has not yet gone through to include patients on tamoxifen, therefore, she is not eligible for NSABP B-55 as of 10/21/2014. Tamoxifen held for 1 month because of some dizzy spells and hysterectomy and oophorectomy planned for early 2015, at  ich point we will switch to Arimidex.        The patient was seen on 02/25/2014 having CT scans because a preop chest x-ray before hysterectomy showed a shadow in the right apex. CT scans showed what seemed to be radiation change in the apex of the right lung. No abnormality in the neck or abdomen and a residual small seroma in the right axilla. Path reports on her hysterectomy and BSO were benign and because of her intolerance of tamoxifen, we started her on Effexor 12.5 b.i.d. to be increased as tolerated and t hen to resume tamoxifen every other day for a month and then go to daily if possible. Once she is stable at this, we will try and switch over to an AI if she can tolerate this.        The patient was seen on 04/21/2015 with a CT scan done to followup on changes noted on a previous CT scans, which were felt to be radiation-related. Thankfully, the scan shows diminished consolidation of the pleural surface and a small subpleural air space disease, stable at 8 x 4 mm and images through the upper abdomen and the re s t of the lung were negative. She is up to 37.5 of Effexor with good control of her hot flashes and taking her tamoxifen every other day and we have asked her to increase her tamoxifen to daily and we will see her back in 3 months and if she is tolerating this well, consider switching to an AI at that time.        Patient seen on 07/21/2015, tolerating tamoxifen well at full  dose for the last 3 months since her hysterectomy. Plans to switch to Femara in 2-3 months. Nocturnal cough, which I suspect is allergic or reflux related. Consider a steroid inhaler.        7/17 Arimidex since July 2015.  Overall she is tolerating Arimidex well with very few hot flashes because of the Effexor but she is clearly not herself due to the postmenopausal state and has less energy low libido and I think some amount of depression which is not unusual in this situation.  Her mother had osteoporosis and her baseline bone density is osteopenia which is fairly severe and I added Boniva monthly in addition to calcium and vitamin D    .Her neck pain is resolved she is sleeping okay appetite is fair and apart from fatigue which is related to her postmenopausal state she appears to be doing okay.  She is a little more irritable than usual but realizes this is part of the postmenopausal state  Complains of some irritable bowel type symptoms with constipation alternating with diarrhea and she is a little more anemic than usual and her ferritin was low and she was given oral iron with good improvement in her hemoglobin.  I'm reluctant to add another test with colonoscopy for at this point and we will wait and see how she does off iron and if her hemoglobin drops and her iron is low again the colonoscopy and EGD will be scheduled .  Her lymphedema is a little better this summer    She continues to have discomfort over the right chest wall where she received radiation and does not feel that the implant is his flexible and stiffer and because of the radiation fibrosis on CAT scan has been concerned about issues here.    Her liver function tests have been barely up on and off and therefore we will do another ultrasound to evaluate the liver and gallbladder.  2/19  she continues to have some tenderness in the rib cage below the right implant and is worried about this.  We did a bone scan in November which was benign but  her bone density shows osteoporosis and she has been on Boniva more regularly now but I still want to repeat the bone density in November and add Prolia if it is not improved  8/19  CAT scan of the chest abdomen pelvis showed no evidence of recurrent cancer but a fatty liver was noted  I told her to follow-up with the gastroenterologist to ask how best to deal with her fatty liver and also how long to stay on the steroids as she has no follow-up appointment with them-lymphocytic colitis diagnosed and treated with steroids?  Related to Arimidex      SOCIAL HISTORY: She is , lives with her . She is school psychologist. She does not smoke. She drinks very minimally. No trouble with drug addiction.    FAMILY HISTORY: Parents are in their late 50s and are healthy. She has no siblings. She has a paternal aunt with breast cancer in 40s, maternal grandmother with breast cancer at age 58,   and both of the grandmother' s sisters had breast cancer in their 50s. She has a maternal uncle with prostate cancer at age 54. There is no ovarian, uterine or pancreatic cancer in the family that she is aware of. BRCA 1 positive    Review of Systems   Constitutional: Negative for fatigue (stable 11/11/19).   HENT: Negative.    Respiratory: Positive for chest tightness (breast surgery 11/11/19).    Cardiovascular: Negative.    Gastrointestinal: Positive for abdominal pain (fat grafting 11/11/19). Negative for diarrhea (stable 11/11/19).   Endocrine: Positive for heat intolerance (same 11/11/19).   Genitourinary: Negative.    Neurological: Negative.    Psychiatric/Behavioral: Positive for decreased concentration (same 11/11/19), dysphoric mood (same 11/11/19) and sleep disturbance (little better 11/11/19). The patient is not nervous/anxious (better 4/15/19).    All other systems reviewed and are negative.     A comprehensive 14 point review of systems was performed and was negative except as mentioned.    Medications:  The  current medication list was reviewed in the EMR    ALLERGIES:    Allergies   Allergen Reactions   • No Known Drug Allergy        Objective      There were no vitals filed for this visit.  Current Status 8/19/2019   ECOG score 0       Physical Exam   Pulmonary/Chest:           GENERAL:  Well-developed, well-nourished in no acute distress.   SKIN:  Warm, dry without rashes, purpura or petechiae.  HEAD:  Normocephalic.  EYES:  Pupils equal, round and reactive to light.  EOMs intact.  Conjunctivae normal.  EARS:  Hearing intact.  NOSE:  Septum midline.  No excoriations or nasal discharge.  MOUTH:  Tongue is well-papillated; no stomatitis or ulcers.  Lips normal.  THROAT:  Oropharynx without lesions or exudates.  NECK:  Supple with good range of motion; no thyromegaly or masses, no JVD.  LYMPHATICS:  No cervical, supraclavicular, axillary or inguinal adenopathy.  CHEST:  Lungs clear to percussion and auscultation. Good airflow.  BREASTS: Bilateral implants benign there is no axillary adenopathy.Tenderness in the subcostal and intramammary rib cage right greater than left -point tenderness in the costochondral junction below the right breast-is a little prominence to 1 of her ribs lateral to the implant but  this is chronic- see diagram-recent plastic surgery on her right implant noted  CARDIAC:  Regular rate and rhythm without murmurs, rubs or gallops. Normal S1,S2.  ABDOMEN:  Soft, nontender with no organomegaly or masses.  EXTREMITIES:  No clubbing, cyanosis or edema.  NEUROLOGICAL:  Cranial Nerves II-XII grossly intact.  No focal neurological deficits.  PSYCHIATRIC:  Normal affect and mood.    Exam unchanged 8/19/2019    RECENT LABS:  Hematology WBC   Date Value Ref Range Status   10/28/2019 4.77 3.40 - 10.80 10*3/mm3 Final     RBC   Date Value Ref Range Status   10/28/2019 4.00 3.77 - 5.28 10*6/mm3 Final     Hemoglobin   Date Value Ref Range Status   10/28/2019 11.4 (L) 12.0 - 15.9 g/dL Final     Hematocrit   Date  Value Ref Range Status   10/28/2019 34.5 34.0 - 46.6 % Final     Platelets   Date Value Ref Range Status   10/28/2019 334 140 - 450 10*3/mm3 Final      FINDINGS: Sonographic evaluation of the liver and selective structures  of the right upper quadrant was performed. The right kidney has a normal  appearance. No abnormality of the liver is appreciated. The liver  measures on the order of 17.5 cm in anterior to posterior dimension. The  gallbladder has a normal appearance. The common bile duct measures 0.4  cm in diameter. Per the sonographer, the patient was nontender in the  right upper quadrant during the examination. Visualized portion of the  pancreas appears normal.      IMPRESSION:  Negative liver sonogram.      This report was finalized on 4/14/2017       NUCLEAR MEDICINE WHOLE BODY BONE SCAN     IMPRESSION:  No scintigraphic evidence for bony metastatic disease.     This report was finalized on 11/17/2018     CT CHEST, ABDOMEN, AND PELVIS WITH IV CONTRAST  IMPRESSION:  1. There is no convincing evidence for metastatic disease.  2. Paucity of formed stool within the colon. There is no colonic  thickening to suggest colitis.  3. Hepatic steatosis.         Assessment/Plan   1.[id number] triple negative right breast cancer. [id number] dose dense Adriamycin Cytoxan and weekly carbotaxol-on tamoxifen-switch to Arimidex after oophorectomy in July 2015  2.T1Nx left breast cancer invasive ductal carcinoma, ER/[place] positive, HER2 negative. [id number]                  3. BRCA 1 positive. Post hysterectomy and oophorectomy                   4.  Lymphedema right arm improved with a sleeve                  5.  Mild depression declines any treatment for this now                  6.  Mild anemia due to iron deficiency responding to oral iron                  7.  Osteopenia moderately severe-declined Boniva  was started                   8.  Mildly elevated LFTs with negative liver and pancreas ultrasound -                     CAT  scan consistent with fatty liver                   9.  Exacerbation of hemorrhoidal bleeding due to diarrhea                           10.  Lymphocytic colitis on budesonide    Plan  1 repeat DEXA scan.  Because of osteoporosis in 2018-change to tamoxifen 20 mg daily  2. . Check Ca1 25 annually  3.  Consider Prolia for bone density is worse   4.  Return in 6 months for follow-up.               11/8/2019      CC:

## 2019-11-11 ENCOUNTER — LAB (OUTPATIENT)
Dept: OTHER | Facility: HOSPITAL | Age: 39
End: 2019-11-11

## 2019-11-11 ENCOUNTER — OFFICE VISIT (OUTPATIENT)
Dept: ONCOLOGY | Facility: CLINIC | Age: 39
End: 2019-11-11

## 2019-11-11 VITALS
SYSTOLIC BLOOD PRESSURE: 104 MMHG | OXYGEN SATURATION: 100 % | TEMPERATURE: 98.6 F | DIASTOLIC BLOOD PRESSURE: 76 MMHG | BODY MASS INDEX: 22.52 KG/M2 | HEART RATE: 75 BPM | WEIGHT: 111.7 LBS | RESPIRATION RATE: 16 BRPM | HEIGHT: 59 IN

## 2019-11-11 DIAGNOSIS — C50.111 MALIGNANT NEOPLASM OF CENTRAL PORTION OF BOTH BREASTS IN FEMALE, ESTROGEN RECEPTOR POSITIVE (HCC): ICD-10-CM

## 2019-11-11 DIAGNOSIS — Z17.0 MALIGNANT NEOPLASM OF CENTRAL PORTION OF BOTH BREASTS IN FEMALE, ESTROGEN RECEPTOR POSITIVE (HCC): ICD-10-CM

## 2019-11-11 DIAGNOSIS — K52.832 LYMPHOCYTIC COLITIS: Primary | ICD-10-CM

## 2019-11-11 DIAGNOSIS — Z79.811 LONG TERM CURRENT USE OF AROMATASE INHIBITOR: ICD-10-CM

## 2019-11-11 DIAGNOSIS — C50.112 MALIGNANT NEOPLASM OF CENTRAL PORTION OF BOTH BREASTS IN FEMALE, ESTROGEN RECEPTOR POSITIVE (HCC): ICD-10-CM

## 2019-11-11 LAB
ALBUMIN SERPL-MCNC: 3.9 G/DL (ref 3.5–5.2)
ALBUMIN/GLOB SERPL: 1.3 G/DL
ALP SERPL-CCNC: 100 U/L (ref 39–117)
ALT SERPL W P-5'-P-CCNC: 51 U/L (ref 1–33)
ANION GAP SERPL CALCULATED.3IONS-SCNC: 9.5 MMOL/L (ref 5–15)
AST SERPL-CCNC: 47 U/L (ref 1–32)
BASOPHILS # BLD AUTO: 0.02 10*3/MM3 (ref 0–0.2)
BASOPHILS NFR BLD AUTO: 0.4 % (ref 0–1.5)
BILIRUB SERPL-MCNC: <0.2 MG/DL (ref 0.1–1.2)
BUN BLD-MCNC: 12 MG/DL (ref 6–20)
BUN/CREAT SERPL: 28.6 (ref 7–25)
CALCIUM SPEC-SCNC: 9.2 MG/DL (ref 8.6–10.5)
CHLORIDE SERPL-SCNC: 100 MMOL/L (ref 98–107)
CO2 SERPL-SCNC: 27.5 MMOL/L (ref 22–29)
CREAT BLD-MCNC: 0.42 MG/DL (ref 0.57–1)
DEPRECATED RDW RBC AUTO: 43 FL (ref 37–54)
EOSINOPHIL # BLD AUTO: 0.04 10*3/MM3 (ref 0–0.4)
EOSINOPHIL NFR BLD AUTO: 0.8 % (ref 0.3–6.2)
ERYTHROCYTE [DISTWIDTH] IN BLOOD BY AUTOMATED COUNT: 13.3 % (ref 12.3–15.4)
GFR SERPL CREATININE-BSD FRML MDRD: >150 ML/MIN/1.73
GLOBULIN UR ELPH-MCNC: 2.9 GM/DL
GLUCOSE BLD-MCNC: 84 MG/DL (ref 65–99)
HCT VFR BLD AUTO: 35.1 % (ref 34–46.6)
HGB BLD-MCNC: 10.9 G/DL (ref 12–15.9)
IMM GRANULOCYTES # BLD AUTO: 0.02 10*3/MM3 (ref 0–0.05)
IMM GRANULOCYTES NFR BLD AUTO: 0.4 % (ref 0–0.5)
LYMPHOCYTES # BLD AUTO: 1.13 10*3/MM3 (ref 0.7–3.1)
LYMPHOCYTES NFR BLD AUTO: 21.4 % (ref 19.6–45.3)
MCH RBC QN AUTO: 27.3 PG (ref 26.6–33)
MCHC RBC AUTO-ENTMCNC: 31.1 G/DL (ref 31.5–35.7)
MCV RBC AUTO: 87.8 FL (ref 79–97)
MONOCYTES # BLD AUTO: 0.63 10*3/MM3 (ref 0.1–0.9)
MONOCYTES NFR BLD AUTO: 11.9 % (ref 5–12)
NEUTROPHILS # BLD AUTO: 3.44 10*3/MM3 (ref 1.7–7)
NEUTROPHILS NFR BLD AUTO: 65.1 % (ref 42.7–76)
NRBC BLD AUTO-RTO: 0 /100 WBC (ref 0–0.2)
PLATELET # BLD AUTO: 354 10*3/MM3 (ref 140–450)
PMV BLD AUTO: 9 FL (ref 6–12)
POTASSIUM BLD-SCNC: 4.9 MMOL/L (ref 3.5–5.2)
PROT SERPL-MCNC: 6.8 G/DL (ref 6–8.5)
RBC # BLD AUTO: 4 10*6/MM3 (ref 3.77–5.28)
SODIUM BLD-SCNC: 137 MMOL/L (ref 136–145)
WBC NRBC COR # BLD: 5.28 10*3/MM3 (ref 3.4–10.8)

## 2019-11-11 PROCEDURE — 99214 OFFICE O/P EST MOD 30 MIN: CPT | Performed by: INTERNAL MEDICINE

## 2019-11-11 PROCEDURE — 80053 COMPREHEN METABOLIC PANEL: CPT | Performed by: INTERNAL MEDICINE

## 2019-11-11 PROCEDURE — 36415 COLL VENOUS BLD VENIPUNCTURE: CPT

## 2019-11-11 PROCEDURE — 85025 COMPLETE CBC W/AUTO DIFF WBC: CPT | Performed by: INTERNAL MEDICINE

## 2019-11-11 RX ORDER — EXEMESTANE 25 MG/1
25 TABLET ORAL DAILY
Qty: 90 TABLET | Refills: 3 | Status: SHIPPED | OUTPATIENT
Start: 2019-11-11 | End: 2019-11-11

## 2019-11-11 RX ORDER — TAMOXIFEN CITRATE 20 MG/1
20 TABLET ORAL DAILY
Qty: 90 TABLET | Refills: 3 | Status: SHIPPED | OUTPATIENT
Start: 2019-11-11 | End: 2020-12-14

## 2019-11-26 ENCOUNTER — HOSPITAL ENCOUNTER (OUTPATIENT)
Dept: BONE DENSITY | Facility: HOSPITAL | Age: 39
Discharge: HOME | End: 2019-11-26
Admitting: INTERNAL MEDICINE

## 2019-11-26 DIAGNOSIS — C50.111 MALIGNANT NEOPLASM OF CENTRAL PORTION OF BOTH BREASTS IN FEMALE, ESTROGEN RECEPTOR POSITIVE (HCC): ICD-10-CM

## 2019-11-26 DIAGNOSIS — K52.832 LYMPHOCYTIC COLITIS: ICD-10-CM

## 2019-11-26 DIAGNOSIS — C50.112 MALIGNANT NEOPLASM OF CENTRAL PORTION OF BOTH BREASTS IN FEMALE, ESTROGEN RECEPTOR POSITIVE (HCC): ICD-10-CM

## 2019-11-26 DIAGNOSIS — Z79.811 LONG TERM CURRENT USE OF AROMATASE INHIBITOR: ICD-10-CM

## 2019-11-26 DIAGNOSIS — Z17.0 MALIGNANT NEOPLASM OF CENTRAL PORTION OF BOTH BREASTS IN FEMALE, ESTROGEN RECEPTOR POSITIVE (HCC): ICD-10-CM

## 2019-11-26 PROCEDURE — 77080 DXA BONE DENSITY AXIAL: CPT

## 2019-12-04 ENCOUNTER — TELEPHONE (OUTPATIENT)
Dept: ONCOLOGY | Facility: CLINIC | Age: 39
End: 2019-12-04

## 2019-12-04 RX ORDER — VENLAFAXINE HYDROCHLORIDE 37.5 MG/1
CAPSULE, EXTENDED RELEASE ORAL
Qty: 30 CAPSULE | Refills: 4 | Status: SHIPPED | OUTPATIENT
Start: 2019-12-04 | End: 2020-05-01

## 2020-02-17 RX ORDER — IBANDRONATE SODIUM 150 MG/1
150 TABLET, FILM COATED ORAL
Qty: 3 TABLET | Refills: 3 | Status: SHIPPED | OUTPATIENT
Start: 2020-02-17 | End: 2021-12-15

## 2020-02-17 NOTE — TELEPHONE ENCOUNTER
Boniva refill request rec from [facility]. Per last office note from Dr Moreno-pt is to continue. I have escribed a new rx to [facility].

## 2020-02-27 ENCOUNTER — DOCUMENTATION (OUTPATIENT)
Dept: PHARMACY | Facility: HOSPITAL | Age: 40
End: 2020-02-27

## 2020-02-27 NOTE — PROGRESS NOTES
Received fax for refill on [employer]. Already called in on 2/17/20, called Ruben to find out what was going on. Pt had picked up on 2/18/20. It was a duplicate request.

## 2020-04-21 ENCOUNTER — TELEPHONE (OUTPATIENT)
Dept: GASTROENTEROLOGY | Facility: CLINIC | Age: 40
End: 2020-04-21

## 2020-04-21 NOTE — TELEPHONE ENCOUNTER
----- Message from [place] Rep sent at 4/21/2020  3:56 PM EDT -----  Regarding: Colitis Flare  Contact: [phone]  Pt states she is having a Colitis flare up and is wanting a call back to see about the possibility of get a prescription for Prednisone, it helped her last time she had a flare up.

## 2020-04-21 NOTE — TELEPHONE ENCOUNTER
I called and spoke with her.  Right budesonide and had to do a prednisone taper.  I expressed that I am a little concerned about this given the COVID-19 pandemic given immunosuppression with the prednisone.  She verbalized understanding.  At this point, we agreed to try 4 mg of Imodium 3 times a day.  If this is not efficacious, she is to let me know by the afternoon of the 23rd and then we will try the prednisone.  I did caution her that she would need to be very aggressive and social distancing and have her  go to the grocery store instead of her during the period of time she was on the prednisone.  She verbalizes understanding of this plan.

## 2020-04-21 NOTE — TELEPHONE ENCOUNTER
Call to pt.  States has h/o lymphocytic - used budesonide in the past and had bad reaction with legs swelling, etc.  Was treated successfully with tapering prednisone.     States for past 3 wks, symptoms seem to be returning.  Having diarrhea accompanied by cramping 5-7x/day - denies blood, fever.  Has to get up at night.  Taking colestipol 3/day for past 1 wk without any significant improvement.  Asking if prednisone taper would be appropriate at this time     Update/question to Dr Sales.

## 2020-04-27 ENCOUNTER — TELEPHONE (OUTPATIENT)
Dept: GASTROENTEROLOGY | Facility: CLINIC | Age: 40
End: 2020-04-27

## 2020-04-27 RX ORDER — PREDNISONE 10 MG/1
TABLET ORAL
Qty: 42 TABLET | Refills: 0 | Status: SHIPPED | OUTPATIENT
Start: 2020-04-27 | End: 2020-10-27

## 2020-04-27 NOTE — TELEPHONE ENCOUNTER
"Cate Winston  You; Sonja Sales MD 46 minutes ago (8:09 AM)      Pt is calling this morning to let us know that what you had her do last week did not work, she is wanting to know what she needs to do know       **Call to pt.  States has been using Imodium as instructed 4/21.  States is having less cramps.  Diarrhea 3-4x/day with occasional urgency.  Denies blood, fever.  States thinks would like to proceed with prednisone taper \"just to get it over with\".  Update/request to Dr Sales.  Svetlana Baker RN.   "

## 2020-05-01 RX ORDER — VENLAFAXINE HYDROCHLORIDE 37.5 MG/1
CAPSULE, EXTENDED RELEASE ORAL
Qty: 90 CAPSULE | Refills: 0 | Status: SHIPPED | OUTPATIENT
Start: 2020-05-01 | End: 2020-07-30

## 2020-05-18 ENCOUNTER — APPOINTMENT (OUTPATIENT)
Dept: OTHER | Facility: HOSPITAL | Age: 40
End: 2020-05-18

## 2020-05-18 ENCOUNTER — TELEMEDICINE (OUTPATIENT)
Dept: ONCOLOGY | Facility: CLINIC | Age: 40
End: 2020-05-18

## 2020-05-18 DIAGNOSIS — Z79.811 LONG TERM CURRENT USE OF AROMATASE INHIBITOR: ICD-10-CM

## 2020-05-18 DIAGNOSIS — Z15.01 BRCA1 GENETIC CARRIER: ICD-10-CM

## 2020-05-18 DIAGNOSIS — Z15.09 BRCA1 GENETIC CARRIER: ICD-10-CM

## 2020-05-18 DIAGNOSIS — C50.111 MALIGNANT NEOPLASM OF CENTRAL PORTION OF BOTH BREASTS IN FEMALE, ESTROGEN RECEPTOR POSITIVE (HCC): Primary | ICD-10-CM

## 2020-05-18 DIAGNOSIS — Z17.0 MALIGNANT NEOPLASM OF CENTRAL PORTION OF BOTH BREASTS IN FEMALE, ESTROGEN RECEPTOR POSITIVE (HCC): Primary | ICD-10-CM

## 2020-05-18 DIAGNOSIS — C50.112 MALIGNANT NEOPLASM OF CENTRAL PORTION OF BOTH BREASTS IN FEMALE, ESTROGEN RECEPTOR POSITIVE (HCC): Primary | ICD-10-CM

## 2020-05-18 PROCEDURE — 99213 OFFICE O/P EST LOW 20 MIN: CPT | Performed by: INTERNAL MEDICINE

## 2020-05-18 NOTE — PROGRESS NOTES
Subjective      EASONS FOR FOLLOWUP:   1. [id number] triple negative right breast cancer.   2. T1Nx left breast cancer invasive ductal carcinoma, ER/SD positive, HER2 negative.   3. BRCA 1 positive.   4. Right internal mammary node positive on CT scan.   5. Neoadjuvant dose dense Adriamycin/Cytoxan, weekly Taxol started on 01/20/2014 with plan for axillary dissection on the right and sentinel node on left, bilateral mastectomies plus radiation to the right breast and internal mammary nodes.   6. Addition of carboplatin with weekly Taxol to start on 03/24/2014.   7. Carboplatin held on 05/05/2014 due to thrombocytopenia.   8. The patient was seen on 08/05/2014, postoperatively; bilateral mastectomies and sentinel nodes with a 5 mm residua l grade 3 tumor in the right breast with two negative sentinel nodes (cannot tell if the node with a clip was removed). Complete response in the left breast with two negative sentinel nodes. The decision was made to consider radiation because of her pret reatment internal mammary node on the right and tamoxifen therapy for the hormone positive breast cancer on right. Clinical trials are being looked into.   9. Additional surgery to remove the lymph node with the clip in the right axilla, 3 nodes removed and negative for metastatic disease. Tamoxifen started September 2014.   10. Radiation completed in October 2014. Tamoxifen on hold because of some neurological signs.   11. Not eligible for NSABP B-55 because of tamoxifen as of 10/20/2014.   12. Tamoxifen resumed on 11/18/2014 on every other day dose.   13. CT scans done for right neck discomfort with negative scans except for radiation scarring in the right lung apex.   14. Post total abdominal hysterectomy and BSO in 01/2015. Tamoxifen resumed with switch to an AI in 7/15  15. Lymphedema, right arm.                   16.  Iron deficiency anemia    History of Present Illness  patient is a 36-year-old white female with BRCA1  positivity and bilateral breast cancers currently on Arimidex for the last 4+ years after 1 year of tamoxifen and now on tamoxifen again since 10/19 due to significant osteoporosis    She is here today at a video visit because the coronavirus pandemic.  She is doing well overall but had a recurrence of her lymphocytic colitis and is just completed 1 month of prednisone with complete resolution of her symptoms.  Initially we thought maybe Arimidex was causing this colitis so we switch her to tamoxifen she still had a recurrence therefore I do not think this is drug related    She remains on tamoxifen and Boniva because she declined Prolia and we are hoping that after a year and a half of this combination her bone density improves if not we will prescribe Prolia    She has no other complaints currently.  She always has a little discomfort below the right implant which is stable    Weight and appetite are stable    We discussed that this is a good time point and she has had no recurrence of her triple negative breast cancer that had residual disease after neoadjuvant chemo at 5 years.  For left breast cancer with which is hormone positive her path CR but we have opted to continue hormonal blockade for total of 7 years in September will make 6 years of hormonal blockade  .      C      Active Ambulatory Problems     Diagnosis Date Noted   • Malignant neoplasm of female breast (CMS/HCC) 04/15/2016   • BRCA1 genetic carrier 04/15/2016   • Anemia 08/04/2016   • Long term current use of aromatase inhibitor 08/04/2016   • Osteopenia 08/04/2016   • Diarrhea of presumed infectious origin 08/06/2019   • Elevated transaminase level 08/07/2019   • Fatty liver 08/19/2019   • Diarrhea due to malabsorption 09/12/2019   • Lymphocytic colitis 09/12/2019     Resolved Ambulatory Problems     Diagnosis Date Noted   • No Resolved Ambulatory Problems     Past Medical History:   Diagnosis Date   • Cancer (CMS/HCC)    • Diarrhea    • Heart  burn    • History of radiation therapy    • Lymphedema    • Slow to wake up after anesthesia      Past Surgical History:   Procedure Laterality Date   • BREAST AUGMENTATION Right 2019    Procedure: RIGHT CAPSULOTOMY;  Surgeon: MADELYN Arreola MD;  Location: [facility] OR;  Service: Plastics   • BREAST IMPLANT SURGERY Left    • BREAST SURGERY Bilateral 2014    Mastectomy   • BREAST TISSUE EXPANDER REMOVAL INSERTION OF IMPLANT     •  SECTION  , 2010    X2   • COLONOSCOPY N/A 2019    Hypertrophied anal papilla, NBI[facility]   • FAT GRAFTING Right 2019    Procedure: RIGHT FAT GRAFTING WITH REVOLVE;  Surgeon: MADELYN Arreola MD;  Location: [facility] OR;  Service: Plastics   • HYSTERECTOMY      Total         OB/GYN HISTORY: She is  2, para 2. Menarche at age 10. First childbirth wa s at age 26. She breast-fed her second child for 6 weeks. She was on birth control until her first childbirth and then used a Mirena IUD which she just had removed 3 months ago.     HEMATOLOGIC/ONCOLOGIC HISTORY:   The patient was seen on 2014, having had bilateral mastectomies. Right breast had a 5 mm residual invasive tumor, grade 3, with clear margins, two negative sentinel nodes (I cannot tell if the lymph node with the clip was remove d and we will have to assess this). Left breast shows a complete pathological response with two negative sentinel nodes. The decision was made to review her for radiation therapy because of enlarged right internal mammary node, pretreatment, and to star t tamoxifen. She is going to have her ovaries removed later in the year when she has expanders put in and we can switch to an AI at that point. I am looking into clinical trials for BCRA positive patients with residual disease and for triple negative canc ers with residual disease but the 5 mm tumor may disqualify her.        The patient was seen on 2014 with additional surgery to remove the lymph node in her axilla  with the clip in it which was thankfully benign in addition to 3 other nodes which were nega tive. Radiation to the internal mammary chain planned plus tamoxifen for 5 years. The NSABP B55 study is expected to have an amendment including hormone positive patients and we will revisit this issue after her radiation. Her port has been removed.        NSA BP amendment has not yet gone through to include patients on tamoxifen, therefore, she is not eligible for NSABP B-55 as of 10/21/2014. Tamoxifen held for 1 month because of some dizzy spells and hysterectomy and oophorectomy planned for early 2015, at  ich point we will switch to Arimidex.        The patient was seen on 02/25/2014 having CT scans because a preop chest x-ray before hysterectomy showed a shadow in the right apex. CT scans showed what seemed to be radiation change in the apex of the right lung. No abnormality in the neck or abdomen and a residual small seroma in the right axilla. Path reports on her hysterectomy and BSO were benign and because of her intolerance of tamoxifen, we started her on Effexor 12.5 b.i.d. to be increased as tolerated and t hen to resume tamoxifen every other day for a month and then go to daily if possible. Once she is stable at this, we will try and switch over to an AI if she can tolerate this.        The patient was seen on 04/21/2015 with a CT scan done to followup on changes noted on a previous CT scans, which were felt to be radiation-related. Thankfully, the scan shows diminished consolidation of the pleural surface and a small subpleural air space disease, stable at 8 x 4 mm and images through the upper abdomen and the re s t of the lung were negative. She is up to 37.5 of Effexor with good control of her hot flashes and taking her tamoxifen every other day and we have asked her to increase her tamoxifen to daily and we will see her back in 3 months and if she is tolerating this well, consider switching to an AI at that  time.        Patient seen on 07/21/2015, tolerating tamoxifen well at full dose for the last 3 months since her hysterectomy. Plans to switch to Femara in 2-3 months. Nocturnal cough, which I suspect is allergic or reflux related. Consider a steroid inhaler.        7/17 Arimidex since July 2015.  Overall she is tolerating Arimidex well with very few hot flashes because of the Effexor but she is clearly not herself due to the postmenopausal state and has less energy low libido and I think some amount of depression which is not unusual in this situation.  Her mother had osteoporosis and her baseline bone density is osteopenia which is fairly severe and I added Boniva monthly in addition to calcium and vitamin D    .Her neck pain is resolved she is sleeping okay appetite is fair and apart from fatigue which is related to her postmenopausal state she appears to be doing okay.  She is a little more irritable than usual but realizes this is part of the postmenopausal state  Complains of some irritable bowel type symptoms with constipation alternating with diarrhea and she is a little more anemic than usual and her ferritin was low and she was given oral iron with good improvement in her hemoglobin.  I'm reluctant to add another test with colonoscopy for at this point and we will wait and see how she does off iron and if her hemoglobin drops and her iron is low again the colonoscopy and EGD will be scheduled .  Her lymphedema is a little better this summer    She continues to have discomfort over the right chest wall where she received radiation and does not feel that the implant is his flexible and stiffer and because of the radiation fibrosis on CAT scan has been concerned about issues here.    Her liver function tests have been barely up on and off and therefore we will do another ultrasound to evaluate the liver and gallbladder.  2/19  she continues to have some tenderness in the rib cage below the right implant and is  worried about this.  We did a bone scan in November which was benign but her bone density shows osteoporosis and she has been on Boniva more regularly now but I still want to repeat the bone density in November and add Prolia if it is not improved  8/19  CAT scan of the chest abdomen pelvis showed no evidence of recurrent cancer but a fatty liver was noted  I told her to follow-up with the gastroenterologist to ask how best to deal with her fatty liver and also how long to stay on the steroids as she has no follow-up appointment with them-lymphocytic colitis diagnosed and treated with steroids?  Related to Arimidex    10/19  Switch to tamoxifen again because bone density shows osteoporosis in her spine and both hips patient declined Prolia will add Boniva      SOCIAL HISTORY: She is , lives with her . She is school psychologist. She does not smoke. She drinks very minimally. No trouble with drug addiction.    FAMILY HISTORY: Parents are in their late 50s and are healthy. She has no siblings. She has a paternal aunt with breast cancer in 40s, maternal grandmother with breast cancer at age 58,   and both of the grandmother' s sisters had breast cancer in their 50s. She has a maternal uncle with prostate cancer at age 54. There is no ovarian, uterine or pancreatic cancer in the family that she is aware of. BRCA 1 positive    Review of Systems   Constitutional: Negative for fatigue (stable 11/11/19).   HENT: Negative.    Respiratory: Positive for chest tightness (breast surgery 11/11/19).    Cardiovascular: Negative.    Gastrointestinal: Positive for abdominal pain (fat grafting 11/11/19). Negative for diarrhea (stable 11/11/19).   Endocrine: Positive for heat intolerance (same 11/11/19).   Genitourinary: Negative.    Neurological: Negative.    Psychiatric/Behavioral: Positive for decreased concentration (same 11/11/19), dysphoric mood (same 11/11/19) and sleep disturbance (little better 11/11/19). The  patient is not nervous/anxious (better 4/15/19).    All other systems reviewed and are negative.     A comprehensive 14 point review of systems was performed and was negative except as mentioned.    Medications:  The current medication list was reviewed in the EMR    ALLERGIES:    Allergies   Allergen Reactions   • No Known Drug Allergy        Objective      There were no vitals filed for this visit.  Video visit no vitals  Current Status 11/11/2019   ECOG score 0       Physical Exam   Pulmonary/Chest:           GENERAL:  Well-developed, well-nourished in no acute distress.   SKIN:  Warm, dry without rashes, purpura or petechiae.  HEAD:  Normocephalic.  EYES:  Pupils equal, round and reactive to light.  EOMs intact.  Conjunctivae normal.  EARS:  Hearing intact.  NOSE:  Septum midline.  No excoriations or nasal discharge.  MOUTH:  Tongue is well-papillated; no stomatitis or ulcers.  Lips normal.  THROAT:  Oropharynx without lesions or exudates.  NECK:  Supple with good range of motion; no thyromegaly or masses, no JVD.  LYMPHATICS:  No cervical, supraclavicular, axillary or inguinal adenopathy.  CHEST:  Lungs clear to percussion and auscultation. Good airflow.  BREASTS: Bilateral implants benign there is no axillary adenopathy.Tenderness in the subcostal and intramammary rib cage right greater than left -point tenderness in the costochondral junction below the right breast-is a little prominence to 1 of her ribs lateral to the implant but  this is chronic- see diagram-recent plastic surgery on her right implant noted  CARDIAC:  Regular rate and rhythm without murmurs, rubs or gallops. Normal S1,S2.  ABDOMEN:  Soft, nontender with no organomegaly or masses.  EXTREMITIES:  No clubbing, cyanosis or edema.  NEUROLOGICAL:  Cranial Nerves II-XII grossly intact.  No focal neurological deficits.  PSYCHIATRIC:  Normal affect and mood.    Video visit no physical    RECENT LABS:  Hematology WBC   Date Value Ref Range Status    11/11/2019 5.28 3.40 - 10.80 10*3/mm3 Final     RBC   Date Value Ref Range Status   11/11/2019 4.00 3.77 - 5.28 10*6/mm3 Final     Hemoglobin   Date Value Ref Range Status   11/11/2019 10.9 (L) 12.0 - 15.9 g/dL Final     Hematocrit   Date Value Ref Range Status   11/11/2019 35.1 34.0 - 46.6 % Final     Platelets   Date Value Ref Range Status   11/11/2019 354 140 - 450 10*3/mm3 Final      FINDINGS: Sonographic evaluation of the liver and selective structures  of the right upper quadrant was performed. The right kidney has a normal  appearance. No abnormality of the liver is appreciated. The liver  measures on the order of 17.5 cm in anterior to posterior dimension. The  gallbladder has a normal appearance. The common bile duct measures 0.4  cm in diameter. Per the sonographer, the patient was nontender in the  right upper quadrant during the examination. Visualized portion of the  pancreas appears normal.      IMPRESSION:  Negative liver sonogram.      This report was finalized on 4/14/2017       NUCLEAR MEDICINE WHOLE BODY BONE SCAN     IMPRESSION:  No scintigraphic evidence for bony metastatic disease.     This report was finalized on 11/17/2018     CT CHEST, ABDOMEN, AND PELVIS WITH IV CONTRAST  IMPRESSION:  1. There is no convincing evidence for metastatic disease.  2. Paucity of formed stool within the colon. There is no colonic  thickening to suggest colitis.  3. Hepatic steatosis.         Assessment/Plan   1.[id number] triple negative right breast cancer. [id number] dose dense Adriamycin Cytoxan and weekly carbotaxol-on tamoxifen-switch to Arimidex after oophorectomy in July 2015  2.T1Nx left breast cancer invasive ductal carcinoma, ER/[place] positive, HER2 negative. [id number]                  3. BRCA 1 positive. Post hysterectomy and oophorectomy                   4.  Lymphedema right arm improved with a sleeve                  5.  Mild depression declines any treatment for this now                  6.  Mild anemia  due to iron deficiency responding to oral iron                  7.  Osteopenia moderately severe-declined Prolia -Boniva  was started                   8.  Mildly elevated LFTs with negative liver and pancreas ultrasound -                     CAT scan consistent with fatty liver                   9.  Exacerbation of hemorrhoidal bleeding due to diarrhea                           10.  Lymphocytic colitis on budesonide    Plan  1 continue tamoxifen 20 mg daily until September 2021  2. . Check Ca1 25 annually  3.  Consider Prolia for bone density if worse in 2022  4.  Return in 6 months for follow-up.       Video visit 20 minutes    Standard precautions discussed regarding the Novel Coronavirus (COVID-19)  including patient to limit contact with others outside of home, frequent handwashing and using alcohol gel when unable to use soap and water, as well to monitoring for symptoms and notifying our office via telephone for any symptoms or exposures.           5/18/2020      CC:

## 2020-07-30 RX ORDER — VENLAFAXINE HYDROCHLORIDE 37.5 MG/1
CAPSULE, EXTENDED RELEASE ORAL
Qty: 90 CAPSULE | Refills: 1 | Status: SHIPPED | OUTPATIENT
Start: 2020-07-30 | End: 2021-02-03

## 2020-09-15 ENCOUNTER — TELEPHONE (OUTPATIENT)
Dept: ONCOLOGY | Facility: CLINIC | Age: 40
End: 2020-09-15

## 2020-09-15 DIAGNOSIS — Z17.0 MALIGNANT NEOPLASM OF CENTRAL PORTION OF BOTH BREASTS IN FEMALE, ESTROGEN RECEPTOR POSITIVE (HCC): Primary | ICD-10-CM

## 2020-09-15 DIAGNOSIS — C50.112 MALIGNANT NEOPLASM OF CENTRAL PORTION OF BOTH BREASTS IN FEMALE, ESTROGEN RECEPTOR POSITIVE (HCC): Primary | ICD-10-CM

## 2020-09-15 DIAGNOSIS — C50.111 MALIGNANT NEOPLASM OF CENTRAL PORTION OF BOTH BREASTS IN FEMALE, ESTROGEN RECEPTOR POSITIVE (HCC): Primary | ICD-10-CM

## 2020-09-15 DIAGNOSIS — Z15.09 BRCA1 GENETIC CARRIER: ICD-10-CM

## 2020-09-15 DIAGNOSIS — Z15.01 BRCA1 GENETIC CARRIER: ICD-10-CM

## 2020-09-15 NOTE — TELEPHONE ENCOUNTER
PT CALLING ASKING FOR ORDER TO RTN TO THE LYMPHEDEMA CLINIC D/T R ARM SWELLING AGAIN. MESSAGED ONC RN'S TO PLACE ORDER. PT MADE AWARE.

## 2020-09-15 NOTE — TELEPHONE ENCOUNTER
Pt called she is having a flare up with her Lymphedema she needs a order to be sent to the [facility] Lymphedema clinic  Best call back number [phone]

## 2020-09-24 ENCOUNTER — HOSPITAL ENCOUNTER (OUTPATIENT)
Dept: PHYSICAL THERAPY | Facility: HOSPITAL | Age: 40
Setting detail: THERAPIES SERIES
Discharge: HOME | End: 2020-09-24

## 2020-09-24 DIAGNOSIS — Z42.1 AFTERCARE POSTMASTECTOMY FOR BREAST RECONSTRUCTION: ICD-10-CM

## 2020-09-24 DIAGNOSIS — Z15.01 BRCA1 GENETIC CARRIER: ICD-10-CM

## 2020-09-24 DIAGNOSIS — C50.111 MALIGNANT NEOPLASM OF CENTRAL PORTION OF BOTH BREASTS IN FEMALE, ESTROGEN RECEPTOR POSITIVE (HCC): Primary | ICD-10-CM

## 2020-09-24 DIAGNOSIS — I97.2 POSTMASTECTOMY LYMPHEDEMA SYNDROME: ICD-10-CM

## 2020-09-24 DIAGNOSIS — M85.869 OSTEOPENIA OF LOWER LEG, UNSPECIFIED LATERALITY: ICD-10-CM

## 2020-09-24 DIAGNOSIS — Z90.13 S/P BILATERAL MASTECTOMY: ICD-10-CM

## 2020-09-24 DIAGNOSIS — Z15.09 BRCA1 GENETIC CARRIER: ICD-10-CM

## 2020-09-24 DIAGNOSIS — Z17.0 MALIGNANT NEOPLASM OF CENTRAL PORTION OF BOTH BREASTS IN FEMALE, ESTROGEN RECEPTOR POSITIVE (HCC): Primary | ICD-10-CM

## 2020-09-24 DIAGNOSIS — R60.0 EDEMA OF HAND: ICD-10-CM

## 2020-09-24 DIAGNOSIS — M79.641 RIGHT HAND PAIN: ICD-10-CM

## 2020-09-24 DIAGNOSIS — C50.112 MALIGNANT NEOPLASM OF CENTRAL PORTION OF BOTH BREASTS IN FEMALE, ESTROGEN RECEPTOR POSITIVE (HCC): Primary | ICD-10-CM

## 2020-09-24 PROCEDURE — 97162 PT EVAL MOD COMPLEX 30 MIN: CPT | Performed by: PHYSICAL THERAPIST

## 2020-09-24 PROCEDURE — 97535 SELF CARE MNGMENT TRAINING: CPT | Performed by: PHYSICAL THERAPIST

## 2020-09-24 PROCEDURE — 97110 THERAPEUTIC EXERCISES: CPT | Performed by: PHYSICAL THERAPIST

## 2020-09-24 NOTE — THERAPY EVALUATION
Physical Therapy Lymphedema Initial Evaluation  [facility]     Patient Name: Sravani Sky  : 1980  MRN: [id number]  Today's Date: 2020      Visit Date: 2020    Visit Dx:    ICD-10-CM ICD-9-CM   1. Malignant neoplasm of central portion of both breasts in female, estrogen receptor positive (CMS/HCC)  C50.111 174.1    Z17.0 V86.0    C50.112    2. Postmastectomy lymphedema syndrome  I97.2 457.0   3. S/P bilateral mastectomy  Z90.13 V45.71   4. Aftercare postmastectomy for breast reconstruction  Z42.1 V51.0   5. Right hand pain  M79.641 729.5   6. Edema of hand  R60.0 782.3   7. Osteopenia of lower leg, unspecified laterality  M85.869 733.90   8. BRCA1 genetic carrier  Z15.01 V84.01    Z15.09        Patient Active Problem List   Diagnosis   • Malignant neoplasm of female breast (CMS/HCC)   • BRCA1 genetic carrier   • Anemia   • Long term current use of aromatase inhibitor   • Osteopenia   • Diarrhea of presumed infectious origin   • Elevated transaminase level   • Fatty liver   • Diarrhea due to malabsorption   • Lymphocytic colitis        Past Medical History:   Diagnosis Date   • Cancer (CMS/HCC)     Bilateral breast cancer; diagnosed 2013   • Diarrhea     x3 weeks   • Heart burn    • History of radiation therapy    • Lymphedema     Right hand and wrist   • Slow to wake up after anesthesia     mother        Past Surgical History:   Procedure Laterality Date   • BREAST AUGMENTATION Right 2019    Procedure: RIGHT CAPSULOTOMY;  Surgeon: MADELYN Arreola MD;  Location: [facility];  Service: Plastics   • BREAST IMPLANT SURGERY Left    • BREAST SURGERY Bilateral 2014    Mastectomy   • BREAST TISSUE EXPANDER REMOVAL INSERTION OF IMPLANT     •  SECTION  , 2010    X2   • COLONOSCOPY N/A 2019    Hypertrophied anal papilla, [facility]   • FAT GRAFTING Right 2019    Procedure: RIGHT FAT GRAFTING WITH REVOLVE;  Surgeon: MADELYN Arreola MD;  Location: [facility];  Service:  Plastics   • HYSTERECTOMY  2015    Total       Visit Dx:    ICD-10-CM ICD-9-CM   1. Malignant neoplasm of central portion of both breasts in female, estrogen receptor positive (CMS/HCC)  C50.111 174.1    Z17.0 V86.0    C50.112    2. Postmastectomy lymphedema syndrome  I97.2 457.0   3. S/P bilateral mastectomy  Z90.13 V45.71   4. Aftercare postmastectomy for breast reconstruction  Z42.1 V51.0   5. Right hand pain  M79.641 729.5   6. Edema of hand  R60.0 782.3   7. Osteopenia of lower leg, unspecified laterality  M85.869 733.90   8. BRCA1 genetic carrier  Z15.01 V84.01    Z15.09        Patient History     Row Name 09/24/20 0700             History    Chief Complaint  Swelling;Pain  -SC      Type of Pain  Hand pain  -SC      Date Current Problem(s) Began  07/17/14  -SC      Brief Description of Current Complaint  Patient long standing patient of mine, last seen 2016, treatment for post mastectomy lymphedema syndrome, has been self managing symptoms since 2016, states in July flare up of right hand pain and swelling. current garments worn out. pain in middle finger. is using worn out custom glove extended to elbow and night garment daily.   -SC      Previous treatment for THIS PROBLEM  Rehabilitation;Massage  -SC      Patient/Caregiver Goals  Relieve pain;Return to prior level of function;Improve mobility;Know what to do to help the symptoms;Decrease swelling  -SC      Current Tobacco Use  none  -SC      Patient's Rating of General Health  Good  -SC      Hand Dominance  right-handed  -SC      Occupation/sports/leisure activities  school psychologist  -SC      Patient seeing anyone else for problem(s)?  Dr. Moreno oncology  -SC      How has patient tried to help current problem?  compression, elevation, self massage, stress ball, gentle exercise  -SC      History of Previous Related Injuries  post mastectomy lymphedema 2014  -SC      Are you or can you be pregnant  No  -SC         Pain     Pain Location  Hand;Finger  (Comment which one) 3rd digit right  -SC      Pain at Present  2  -SC      Pain at Best  2  -SC      Pain at Worst  7  -SC      Pain Frequency  Constant/continuous  -SC      Pain Description  Aching  -SC      What Performance Factors Make the Current Problem(s) WORSE?  unknown  -SC      What Performance Factors Make the Current Problem(s) BETTER?  massage, compression  -SC      Is your sleep disturbed?  Unspecified  -SC         Fall Risk Assessment    Any falls in the past year:  No  -SC      Previous Functional Level  -- independent  -SC         Services    Are you currently receiving Home Health services  No  -SC         Daily Activities    Primary Language  English  -SC      Are you able to read  Yes  -SC      Are you able to write  Yes  -SC      How does patient learn best?  Listening;Reading;Demonstration;Pictures/Video  -SC      Teaching needs identified  Home Exercise Program;Management of Condition  -SC      Patient is concerned about/has problems with  Coordination;Difficulty with self care (i.e. bathing, dressing, toileting:;Flexibility;Grasping objects lifting;Performing home management (household chores, shopping, care of dependents);Performing job responsibilities/community activities (work, school,;Performing sports, recreation, and play activities;Reaching over head;Repetitive movements of the hand, arm, shoulder;Writing/grasping items with hand(s)  -SC      Does patient have problems with the following?  None  -SC      Barriers to learning  None  -SC      Explanation of Functional Status Problem  independent  -SC      Pt Participated in POC and Goals  Yes  -SC         Safety    Are you being hurt, hit, or frightened by anyone at home or in your life?  No  -SC      Are you being neglected by a caregiver  No  -SC        User Key  (r) = Recorded By, (t) = Taken By, (c) = Cosigned By    Initials Name Provider Type    Sonja Delgado, PT Physical Therapist          Lymphedema     Row Name 09/24/20  0700             Subjective Pain    Able to rate subjective pain?  yes  -SC      Pre-Treatment Pain Level  2  -SC      Subjective Pain Comment  7/10  -SC         Lymphedema Assessment    Lymphedema Classification  RUE:;stage 1 (Spontaneously Reversible);LUE:;at risk/stage 0;secondary  -SC      Lymphedema Cancer Related Sx  bilateral;simple mastectomy;sentinel node biopsy;reconstructive;right;axillary dissection;hysterectomy  -SC      Lymphedema Surgery Comments  01/13/2014 left mediport; 07/17/2014 R TM, SLNB L TM SLBN, B TE; 08/20/14 excision R axillary mass/removal of port; 05/12/2015 second stage reconstruction expanders to implants; 06/15/2015 removal left deflated implant, new implant; 11/05/2019 right capsular contracture plastic surgery  -SC      Lymph Nodes Removed #  5 R 0/2; 0/3 L 0/2  -SC      Positive Lymph Nodes #  0  -SC      Cancer Comments  triple negative; BRCA (+)  -SC      Chemo Received  yes  -SC      Chemo Treatments #/Timeframe  NAC Jan to June 2014  -SC      Radiation Therapy Received  yes  -SC      Radiation Treatments #/Timeframe  R chest/axilla completed Oct 2014  -SC      Infections or Cellulitis?  no  -SC      Lymphedema Assessment Comments  stage 1 lymphedema right hand currently  -SC         Lymphedema Edema Assessment    Ptting Edema Category  By grade out of 4  -SC      Pitting Edema  + 1/4 (+1 of 4);Mild  -SC      Edema Assessment Comment  mild edema noted dorsal hand into 3rd digit right  -SC         Skin Changes/Observations    Skin Observations Comment  intact  -SC         Lymphedema Sensation    Lymphedema Sensation Tests  light touch  -SC      Lymphedema Light Touch  RUE:;mild impairment  -SC         Lymphedema Pulses/Capillary Refill    Lymph Pulses Capillary Refill Comments  intact  -SC         Lymphedema Measurements    Measurement Type(s)  Quick Girth  -SC      Quick Girth Areas  Upper extremities  -SC         LUE Quick Girth (cm)    Axilla  29 cm  -SC      Mid upper arm  28  cm  -SC      Elbow  23 cm  -SC      Mid forearm  22.2 cm  -SC      Wrist crease  14.5 cm  -SC      Web space  17.2 cm  -SC      Met-heads  17 cm  -SC      LUE Quick Girth Total  150.9  -SC         RUE Quick Girth (cm)    Axilla  28 cm  -SC      Mid upper arm  28 cm  -SC      Elbow  23 cm  -SC      Mid forearm  20.5 cm  -SC      Wrist crease  14.5 cm  -SC      Web space  18 cm  -SC      Met-heads  18 cm  -SC      RUE Quick Girth Total  150  -SC         Manual Lymphatic Drainage    Manual Therapy  education self massage, pump, elevation  -SC         Compression/Skin Care    Compression/Skin Care Comments  presented with custom daytime glove extended to elbow from 2016 (marked worn out) and custom night garment glove extended to forearm from 2016, some worn, reports having OTS compression sleeve from 2016 (newer since only worn for travel), did purchase OTS arthritic gloves but does not use currently  -SC        User Key  (r) = Recorded By, (t) = Taken By, (c) = Cosigned By    Initials Name Provider Type    Sonja Delgado PT Physical Therapist            PT Ortho     Row Name 09/24/20 0700       Subjective Comments    Subjective Comments  initial evaluation  -SC       Precautions and Contraindications    Precautions/Limitations  other (see comments)  -SC    Precautions  bilateral implants  -SC    Contraindications  B SLNB, no IV/BP (R UE), no modalities, triple negative  -SC       Posture/Observations    Posture- WNL  Posture is WNL  -SC       General ROM    GENERAL ROM COMMENTS  B UEs WNLs  -SC       MMT (Manual Muscle Testing)    General MMT Comments  B UEs WNLs  -SC       Gait/Stairs (Locomotion)    Comment (Gait/Stairs)  normal  -SC      User Key  (r) = Recorded By, (t) = Taken By, (c) = Cosigned By    Initials Name Provider Type    Sonja Delgado, PT Physical Therapist                    Therapy Education  Education Details: in depth education of lymphedema, stages, treatment options and  prognosis, CDT, pump, compression, elevation, self massage, exercises, formal care  Given: HEP, Symptoms/condition management, Pain management, Posture/body mechanics, Mobility training, Edema management, Other (comment)  Program: New  How Provided: Verbal, Demonstration, Written  Provided to: Patient  Level of Understanding: Teach back education performed, Verbalized, Demonstrated      OP Exercises     Row Name 09/24/20 0700             Subjective Comments    Subjective Comments  initial evaluation  -SC         Subjective Pain    Able to rate subjective pain?  yes  -SC      Pre-Treatment Pain Level  2  -SC      Subjective Pain Comment  7/10  -SC        User Key  (r) = Recorded By, (t) = Taken By, (c) = Cosigned By    Initials Name Provider Type    SC Sonja Griffin, PT Physical Therapist                      PT OP Goals     Row Name 09/24/20 0700          PT Short Term Goals    STG Date to Achieve  10/22/20  -SC     STG 1  Patient independent and compliant with initial home exercise program focused on diaphragmatic breathing, range of motion, flexibility to decrease edema and improve lymphatic flow for decreased edema and decreased risk of infection.   -SC     STG 1 Progress  New  -SC     STG 2  Patient independent and compliant with self-massage techniques with spouse/family member as needed for improved lymphatic drainage, decreased edema/symptoms, decreased risk of infection and improved transition to self-care of condition.   -SC     STG 2 Progress  New  -SC     STG 3  Patient independent and compliant with self-wrapping techniques of compression bandages with spouse/family member as indicated for improved self-management of condition.   -SC     STG 3 Progress  New  -SC        Long Term Goals    LTG Date to Achieve  12/24/20  -SC     LTG 1  Patient demonstrate decreased net edema of right upper extremity >/= 2-4 cm for decrease in edema, symptoms, decreased risk of infection and improved skin  care/transition to self-care of condition.   -SC     LTG 1 Progress  New  -SC     LTG 2  Patient independent and compliant with home pneumatic compression pump for transition to self-care of condition.  -SC     LTG 2 Progress  New  -SC     LTG 3  Patient independent and compliant with daytime/night time OTS vs custom compression garments as indicated for decreased risk of lymphedema and infection and safety with transition of self-care of condition.   -SC     LTG 3 Progress  New  -SC     LTG 4  Patient independent and compliant with advanced Home Exercise Program for self-management of condition.   -SC     LTG 4 Progress  New  -SC     LTG 5  Patient transition to cardiovascular exercise program (walking) >/=150 to 180 mins/week with moderate intensity for improved heart health, weight loss, decreased risk of osteoporosis and improved phase angle/metabolic rate.  -SC     LTG 5 Progress  New  -SC        Time Calculation    PT Goal Re-Cert Due Date  12/24/20  -SC       User Key  (r) = Recorded By, (t) = Taken By, (c) = Cosigned By    Initials Name Provider Type    Sonja Delgado, PT Physical Therapist          PT Assessment/Plan     Row Name 09/24/20 0700          PT Assessment    Functional Limitations  Limitations in community activities;Limitation in home management;Performance in leisure activities;Performance in self-care ADL;Performance in sport activities;Performance in work activities  -SC     Impairments  Edema;Impaired lymphatic circulation;Integumentary integrity;Pain  -SC     Assessment Comments  Mrs. Sky is a pleasant 40 year old female, history of BRCA (+) bilateral breast cancer, triple negative, left mediport placement in 2014, completed NAC 2014, s/p bilateral mastectomy with sentinel node biopsy bilateral with immediate TE reconstruction 2014, right axillary reexcision 2014, completed radiation 2014 to right chest wall/axilla, developed post mastectomy lymphedema syndrome right hand,  completed formal therapy in lymphedema clinic until 2016, had second stage reconstruction in 2015, with additional surgeries for reconstruction of breast in 2015 and 2019, port removal in 2014, hysterectomy, returns to therapy today with noted return of lymphedema right hand with right hand pain insidious onset in July 2020, presents with marked worn out garments, no new products since 2016. Measureable and visible edema right hand, s/s consistent with post mastectomy lymphedema syndrome. Working currently as school psychologist. Will benefit from re-establishment of formal care.  -SC     Please refer to paper survey for additional self-reported information  Yes  -SC     Rehab Potential  Good  -SC     Patient/caregiver participated in establishment of treatment plan and goals  Yes  -SC     Patient would benefit from skilled therapy intervention  Yes  -SC        PT Plan    Predicted Duration of Therapy Intervention (OT)  20 sessions  -SC     Planned CPT's?  PT EVAL MOD COMPLELITY: 24461;PT RE-EVAL: 46340;PT THER PROC EA 15 MIN: 63863;PT THER ACT EA 15 MIN: 89901;PT MANUAL THERAPY EA 15 MIN: 91020;PT NEUROMUSC RE-EDUCATION EA 15 MIN: 82096;PT GAIT TRAINING EA 15 MIN: 25233;PT EVAL AQUA: 30866;PT AQUATIC THERAPY EA 15 MIN: 14744;PT SELF CARE/HOME MGMT/TRAIN EA 15: 98572;PT HOT OR COLD PACK TREAT MCARE;PT BIS XTRACELL FLUID ANALYSIS: 58082  -SC     PT Plan Comments  fit for custom and OTS garments, pump, MLD, finger wrapping for night, education  -SC       User Key  (r) = Recorded By, (t) = Taken By, (c) = Cosigned By    Initials Name Provider Type    SC Sonja Griffin, PT Physical Therapist             Outcome Measure Options: Quick DASH  Quick DASH  Quick Dash Comments: did not complete         Time Calculation:   Start Time: 0700  Stop Time: 0750  Time Calculation (min): 50 min   Therapy Charges for Today     Code Description Service Date Service Provider Modifiers Qty    02415654636  PT THER PROC EA 15 MIN  9/24/2020 Sonja Griffin, PT GP 1    36271038474 HC PT SELF CARE/MGMT/TRAIN EA 15 MIN 9/24/2020 Sonja Griffin, PT GP 1    72173852572 HC PT EVAL MOD COMPLEXITY 2 9/24/2020 Sonja Griffin, PT GP 1          PT G-Codes  Outcome Measure Options: Sara William, LEE  9/24/2020

## 2020-09-29 ENCOUNTER — HOSPITAL ENCOUNTER (OUTPATIENT)
Dept: PHYSICAL THERAPY | Facility: HOSPITAL | Age: 40
Setting detail: THERAPIES SERIES
Discharge: HOME | End: 2020-09-29

## 2020-09-29 DIAGNOSIS — C50.112 MALIGNANT NEOPLASM OF CENTRAL PORTION OF BOTH BREASTS IN FEMALE, ESTROGEN RECEPTOR POSITIVE (HCC): Primary | ICD-10-CM

## 2020-09-29 DIAGNOSIS — Z15.01 BRCA1 GENETIC CARRIER: ICD-10-CM

## 2020-09-29 DIAGNOSIS — R60.0 EDEMA OF HAND: ICD-10-CM

## 2020-09-29 DIAGNOSIS — Z17.0 MALIGNANT NEOPLASM OF CENTRAL PORTION OF BOTH BREASTS IN FEMALE, ESTROGEN RECEPTOR POSITIVE (HCC): Primary | ICD-10-CM

## 2020-09-29 DIAGNOSIS — Z90.13 S/P BILATERAL MASTECTOMY: ICD-10-CM

## 2020-09-29 DIAGNOSIS — I97.2 POSTMASTECTOMY LYMPHEDEMA SYNDROME: ICD-10-CM

## 2020-09-29 DIAGNOSIS — M85.869 OSTEOPENIA OF LOWER LEG, UNSPECIFIED LATERALITY: ICD-10-CM

## 2020-09-29 DIAGNOSIS — Z15.09 BRCA1 GENETIC CARRIER: ICD-10-CM

## 2020-09-29 DIAGNOSIS — M79.641 RIGHT HAND PAIN: ICD-10-CM

## 2020-09-29 DIAGNOSIS — C50.111 MALIGNANT NEOPLASM OF CENTRAL PORTION OF BOTH BREASTS IN FEMALE, ESTROGEN RECEPTOR POSITIVE (HCC): Primary | ICD-10-CM

## 2020-09-29 DIAGNOSIS — Z42.1 AFTERCARE POSTMASTECTOMY FOR BREAST RECONSTRUCTION: ICD-10-CM

## 2020-09-29 PROCEDURE — 97110 THERAPEUTIC EXERCISES: CPT | Performed by: PHYSICAL THERAPIST

## 2020-09-29 NOTE — THERAPY TREATMENT NOTE
Outpatient Physical Therapy Lymphedema Treatment Note  [facility]     Patient Name: Sravani Sky  : 1980  MRN: [id number]  Today's Date: 2020        Visit Date: 2020    Visit Dx:    ICD-10-CM ICD-9-CM   1. Malignant neoplasm of central portion of both breasts in female, estrogen receptor positive (CMS/HCC)  C50.111 174.1    Z17.0 V86.0    C50.112    2. Postmastectomy lymphedema syndrome  I97.2 457.0   3. S/P bilateral mastectomy  Z90.13 V45.71   4. Aftercare postmastectomy for breast reconstruction  Z42.1 V51.0   5. Right hand pain  M79.641 729.5   6. Edema of hand  R60.0 782.3   7. Osteopenia of lower leg, unspecified laterality  M85.869 733.90   8. BRCA1 genetic carrier  Z15.01 V84.01    Z15.09        Patient Active Problem List   Diagnosis   • Malignant neoplasm of female breast (CMS/HCC)   • BRCA1 genetic carrier   • Anemia   • Long term current use of aromatase inhibitor   • Osteopenia   • Diarrhea of presumed infectious origin   • Elevated transaminase level   • Fatty liver   • Diarrhea due to malabsorption   • Lymphocytic colitis        Lymphedema     Row Name 20 0930             Subjective Pain    Able to rate subjective pain?  yes  -SC      Pre-Treatment Pain Level  2  -SC         Subjective Comments    Subjective Comments  I think it is a little worse and not sure why. Back of the hand. Nothing in the arm itself. I just hope getting new products helps.   -SC         Manual Lymphatic Drainage    Manual Therapy  provided handouts on pump  -SC         Compression/Skin Care    Compression/Skin Care Comments  marked in depth fitting of custom daytime, night time, and OTS products with education of use, insurance coverage, ordering through [employer] and online if indicated  -SC        User Key  (r) = Recorded By, (t) = Taken By, (c) = Cosigned By    Initials Name Provider Type    Sonja Delgado PT Physical Therapist                        PT Assessment/Plan     Row Name  09/29/20 0930          PT Assessment    Assessment Comments  custom and OTS compression garment daytime/night time completed today  -SC        PT Plan    PT Plan Comments  instruct finger wrapping, manual, assess garments as they arrive  -SC       User Key  (r) = Recorded By, (t) = Taken By, (c) = Cosigned By    Initials Name Provider Type    Sonja Delgado, PT Physical Therapist             OP Exercises     Row Name 09/29/20 0930             Subjective Comments    Subjective Comments  I think it is a little worse and not sure why. Back of the hand. Nothing in the arm itself. I just hope getting new products helps.   -SC         Subjective Pain    Able to rate subjective pain?  yes  -SC      Pre-Treatment Pain Level  2  -SC        User Key  (r) = Recorded By, (t) = Taken By, (c) = Cosigned By    Initials Name Provider Type    Sonja Delgado, PT Physical Therapist                      PT OP Goals     Row Name 09/29/20 0930          PT Short Term Goals    STG Date to Achieve  10/22/20  -SC     STG 1  Patient independent and compliant with initial home exercise program focused on diaphragmatic breathing, range of motion, flexibility to decrease edema and improve lymphatic flow for decreased edema and decreased risk of infection.   -SC     STG 1 Progress  Progressing  -SC     STG 1 Progress Comments  reviewed self massage, elevation, and links for exercises today  -SC     STG 2  Patient independent and compliant with self-massage techniques with spouse/family member as needed for improved lymphatic drainage, decreased edema/symptoms, decreased risk of infection and improved transition to self-care of condition.   -SC     STG 2 Progress  Progressing  -SC     STG 2 Progress Comments  reviewed importance today, provided handouts of pump as well  -SC     STG 3  Patient independent and compliant with self-wrapping techniques of compression bandages with spouse/family member as indicated for improved  self-management of condition.   -SC     STG 3 Progress  Ongoing  -SC        Long Term Goals    LTG Date to Achieve  12/24/20  -SC     LTG 1  Patient demonstrate decreased net edema of right upper extremity >/= 2-4 cm for decrease in edema, symptoms, decreased risk of infection and improved skin care/transition to self-care of condition.   -SC     LTG 1 Progress  Ongoing  -SC     LTG 2  Patient independent and compliant with home pneumatic compression pump for transition to self-care of condition.  -SC     LTG 2 Progress  Ongoing  -SC     LTG 2 Progress Comments  awaiting insurance verification  -SC     LTG 3  Patient independent and compliant with daytime/night time OTS vs custom compression garments as indicated for decreased risk of lymphedema and infection and safety with transition of self-care of condition.   -SC     LTG 3 Progress  Ongoing  -SC     LTG 3 Progress Comments  fitting completed today  -SC     LTG 4  Patient independent and compliant with advanced Home Exercise Program for self-management of condition.   -SC     LTG 4 Progress  Ongoing  -SC     LTG 5  Patient transition to cardiovascular exercise program (walking) >/=150 to 180 mins/week with moderate intensity for improved heart health, weight loss, decreased risk of osteoporosis and improved phase angle/metabolic rate.  -SC     LTG 5 Progress  Ongoing  -SC        Time Calculation    PT Goal Re-Cert Due Date  12/24/20  -SC       User Key  (r) = Recorded By, (t) = Taken By, (c) = Cosigned By    Initials Name Provider Type    Sonja Delgado, PT Physical Therapist          Therapy Education  Education Details: compression options  Program: Reinforced  How Provided: Verbal, Demonstration  Provided to: Patient  Level of Understanding: Teach back education performed, Verbalized, Demonstrated              Time Calculation:   Start Time: 0930  Stop Time: 1014  Time Calculation (min): 44 min   Therapy Charges for Today     Code Description Service  Date Service Provider Modifiers Qty    92656704324 HC PT THER PROC EA 15 MIN 9/29/2020 Sonja Griffin, PT GP 3                    Sonja Carrington-Gaby, PT  9/29/2020

## 2020-10-06 ENCOUNTER — HOSPITAL ENCOUNTER (OUTPATIENT)
Dept: PHYSICAL THERAPY | Facility: HOSPITAL | Age: 40
Setting detail: THERAPIES SERIES
Discharge: HOME | End: 2020-10-06

## 2020-10-06 DIAGNOSIS — Z17.0 MALIGNANT NEOPLASM OF CENTRAL PORTION OF BOTH BREASTS IN FEMALE, ESTROGEN RECEPTOR POSITIVE (HCC): ICD-10-CM

## 2020-10-06 DIAGNOSIS — Z15.09 BRCA1 GENETIC CARRIER: ICD-10-CM

## 2020-10-06 DIAGNOSIS — I97.2 POSTMASTECTOMY LYMPHEDEMA SYNDROME: Primary | ICD-10-CM

## 2020-10-06 DIAGNOSIS — R60.0 EDEMA OF HAND: ICD-10-CM

## 2020-10-06 DIAGNOSIS — C50.112 MALIGNANT NEOPLASM OF CENTRAL PORTION OF BOTH BREASTS IN FEMALE, ESTROGEN RECEPTOR POSITIVE (HCC): ICD-10-CM

## 2020-10-06 DIAGNOSIS — Z90.13 S/P BILATERAL MASTECTOMY: ICD-10-CM

## 2020-10-06 DIAGNOSIS — M85.869 OSTEOPENIA OF LOWER LEG, UNSPECIFIED LATERALITY: ICD-10-CM

## 2020-10-06 DIAGNOSIS — Z42.1 AFTERCARE POSTMASTECTOMY FOR BREAST RECONSTRUCTION: ICD-10-CM

## 2020-10-06 DIAGNOSIS — C50.111 MALIGNANT NEOPLASM OF CENTRAL PORTION OF BOTH BREASTS IN FEMALE, ESTROGEN RECEPTOR POSITIVE (HCC): ICD-10-CM

## 2020-10-06 DIAGNOSIS — M79.641 RIGHT HAND PAIN: ICD-10-CM

## 2020-10-06 DIAGNOSIS — Z15.01 BRCA1 GENETIC CARRIER: ICD-10-CM

## 2020-10-06 PROCEDURE — 97110 THERAPEUTIC EXERCISES: CPT | Performed by: PHYSICAL THERAPIST

## 2020-10-06 PROCEDURE — 97140 MANUAL THERAPY 1/> REGIONS: CPT | Performed by: PHYSICAL THERAPIST

## 2020-10-06 NOTE — THERAPY TREATMENT NOTE
Outpatient Physical Therapy Lymphedema Treatment Note  [facility]     Patient Name: Sravani Sky  : 1980  MRN: [id number]  Today's Date: 10/6/2020        Visit Date: 10/06/2020    Visit Dx:    ICD-10-CM ICD-9-CM   1. Postmastectomy lymphedema syndrome  I97.2 457.0   2. Malignant neoplasm of central portion of both breasts in female, estrogen receptor positive (CMS/HCC)  C50.111 174.1    Z17.0 V86.0    C50.112    3. S/P bilateral mastectomy  Z90.13 V45.71   4. Aftercare postmastectomy for breast reconstruction  Z42.1 V51.0   5. Right hand pain  M79.641 729.5   6. Edema of hand  R60.0 782.3   7. Osteopenia of lower leg, unspecified laterality  M85.869 733.90   8. BRCA1 genetic carrier  Z15.01 V84.01    Z15.09        Patient Active Problem List   Diagnosis   • Malignant neoplasm of female breast (CMS/HCC)   • BRCA1 genetic carrier   • Anemia   • Long term current use of aromatase inhibitor   • Osteopenia   • Diarrhea of presumed infectious origin   • Elevated transaminase level   • Fatty liver   • Diarrhea due to malabsorption   • Lymphocytic colitis        Lymphedema     Row Name 10/06/20 1115             Subjective Pain    Able to rate subjective pain?  yes  -SC      Pre-Treatment Pain Level  0  -SC         Subjective Comments    Subjective Comments  It looks pretty good today. That OTS compression glove (medi) does seem to help more. I have my pump fitting tomorrow. Waiting on all my new products.   -SC         Manual Lymphatic Drainage    Manual Lymphatic Drainage  initial sequence;opened regional lymph nodes;opened anastamoses;extremity treatment  -SC      Initial Sequence  supraclavicular;shoulder collectors  -SC      Supraclavicular  right;left  -SC      Shoulder Collectors  right;left  -SC      Opened Regional Lymph Nodes  axillary;inguinal;ribs  -SC      Axillary  right;left  -SC      Inguinal  right  -SC      Ribs  sternal, lateral  -SC      Opened Anastamoses  anterior  axillo-axillary;axillo-inguinal  -SC      Axillo-Inguinal  right  -SC      Extremity Treatment  MLD to full limb;extremity treatment focus on  -SC      MLD to Full Limb  right  -SC      Extremity Treatment Focus On  right hand  -SC      Manual Therapy  review self massage, awaiting pump  -SC         Compression/Skin Care    Compression/Skin Care  skin care;wrapping location;bandaging;compression garment  -SC      Skin Care  moisturizing lotion applied  -SC      Wrapping Location  upper extremity  -SC      Wrapping Location UE  right:;fingers;hand;forearm  -SC      Wrapping Comments  mollelast x 2 finger wrapping with education handouts and youtube video  -SC      Bandaging Technique  circumferential/spiral;figure-eight;light compression  -SC      Compression Garment Comments  presents with OTS compression glove donned  -SC        User Key  (r) = Recorded By, (t) = Taken By, (c) = Cosigned By    Initials Name Provider Type    Sonja Delgado, PT Physical Therapist                        PT Assessment/Plan     Row Name 10/06/20 1117          PT Assessment    Assessment Comments  assessed goals, instructed finger wrapping  -SC        PT Plan    PT Plan Comments  assess finger wrapping, manual, assess garments/pump as they arrive  -SC       User Key  (r) = Recorded By, (t) = Taken By, (c) = Cosigned By    Initials Name Provider Type    Sonja Delgado, PT Physical Therapist             OP Exercises     Row Name 10/06/20 1117             Subjective Comments    Subjective Comments  It looks pretty good today. That OTS compression glove (medi) does seem to help more. I have my pump fitting tomorrow. Waiting on all my new products.   -SC         Subjective Pain    Able to rate subjective pain?  yes  -SC      Pre-Treatment Pain Level  0  -SC        User Key  (r) = Recorded By, (t) = Taken By, (c) = Cosigned By    Initials Name Provider Type    Sonja Delgado, PT Physical Therapist                       PT OP Goals     Row Name 10/06/20 1115          PT Short Term Goals    STG Date to Achieve  10/22/20  -SC     STG 1  Patient independent and compliant with initial home exercise program focused on diaphragmatic breathing, range of motion, flexibility to decrease edema and improve lymphatic flow for decreased edema and decreased risk of infection.   -SC     STG 1 Progress  Progressing  -SC     STG 1 Progress Comments  reviewed today  -SC     STG 2  Patient independent and compliant with self-massage techniques with spouse/family member as needed for improved lymphatic drainage, decreased edema/symptoms, decreased risk of infection and improved transition to self-care of condition.   -SC     STG 2 Progress  Progressing  -SC     STG 2 Progress Comments  reviewed today  -SC     STG 3  Patient independent and compliant with self-wrapping techniques of compression bandages with spouse/family member as indicated for improved self-management of condition.   -SC     STG 3 Progress  Progressing  -SC     STG 3 Progress Comments  instructed finger wrapping for night today  -SC        Long Term Goals    LTG Date to Achieve  12/24/20  -SC     LTG 1  Patient demonstrate decreased net edema of right upper extremity >/= 2-4 cm for decrease in edema, symptoms, decreased risk of infection and improved skin care/transition to self-care of condition.   -SC     LTG 1 Progress  Ongoing  -SC     LTG 2  Patient independent and compliant with home pneumatic compression pump for transition to self-care of condition.  -SC     LTG 2 Progress  Ongoing  -SC     LTG 2 Progress Comments  fitting tomorrow 10/07/2020  -SC     LTG 3  Patient independent and compliant with daytime/night time OTS vs custom compression garments as indicated for decreased risk of lymphedema and infection and safety with transition of self-care of condition.   -SC     LTG 3 Progress  Progressing  -SC     LTG 3 Progress Comments  awaiting custom garment  pricing, compliant with OTS glove  -SC     LTG 4  Patient independent and compliant with advanced Home Exercise Program for self-management of condition.   -SC     LTG 4 Progress  Ongoing  -SC     LTG 5  Patient transition to cardiovascular exercise program (walking) >/=150 to 180 mins/week with moderate intensity for improved heart health, weight loss, decreased risk of osteoporosis and improved phase angle/metabolic rate.  [facility]  Ongoing  -SC        Time Calculation    PT Goal Re-Cert Due Date  12/24/20  -SC       User Key  (r) = Recorded By, (t) = Taken By, (c) = Cosigned By    Initials Name Provider Type    SC Sonja Griffin, PT Physical Therapist          Therapy Education  Education Details: compression options, finger bandaging for night, continuation of care  Given: HEP, Symptoms/condition management, Edema management, Bandaging/dressing change, Other (comment)  Program: Progressed, Modified  How Provided: Demonstration, Verbal, Written  Provided to: Patient  Level of Understanding: Teach back education performed, Verbalized, Demonstrated              Time Calculation:   Start Time: 1115  Stop Time: 1155  Time Calculation (min): 40 min   Therapy Charges for Today     Code Description Service Date Service Provider Modifiers Qty    01972845381  PT THER PROC EA 15 MIN 10/6/2020 Sonja Griffin, PT GP 1    08572896948 HC PT MANUAL THERAPY EA 15 MIN 10/6/2020 Sonja Griffin, PT GP 2                    Sonja William, LEE  10/6/2020

## 2020-10-08 ENCOUNTER — TELEPHONE (OUTPATIENT)
Dept: ONCOLOGY | Facility: CLINIC | Age: 40
End: 2020-10-08

## 2020-10-08 NOTE — TELEPHONE ENCOUNTER
COMPRESSION DEVICE SIGNED PER DR CONDE AND FAXED TO [employer] AT [phone].  CONFIRMATION RECEIVED.

## 2020-10-12 ENCOUNTER — HOSPITAL ENCOUNTER (OUTPATIENT)
Dept: PHYSICAL THERAPY | Facility: HOSPITAL | Age: 40
Setting detail: THERAPIES SERIES
Discharge: HOME | End: 2020-10-12

## 2020-10-12 DIAGNOSIS — Z15.01 BRCA1 GENETIC CARRIER: ICD-10-CM

## 2020-10-12 DIAGNOSIS — C50.112 MALIGNANT NEOPLASM OF CENTRAL PORTION OF BOTH BREASTS IN FEMALE, ESTROGEN RECEPTOR POSITIVE (HCC): ICD-10-CM

## 2020-10-12 DIAGNOSIS — Z90.13 S/P BILATERAL MASTECTOMY: ICD-10-CM

## 2020-10-12 DIAGNOSIS — M79.641 RIGHT HAND PAIN: ICD-10-CM

## 2020-10-12 DIAGNOSIS — I97.2 POSTMASTECTOMY LYMPHEDEMA SYNDROME: Primary | ICD-10-CM

## 2020-10-12 DIAGNOSIS — C50.111 MALIGNANT NEOPLASM OF CENTRAL PORTION OF BOTH BREASTS IN FEMALE, ESTROGEN RECEPTOR POSITIVE (HCC): ICD-10-CM

## 2020-10-12 DIAGNOSIS — M85.869 OSTEOPENIA OF LOWER LEG, UNSPECIFIED LATERALITY: ICD-10-CM

## 2020-10-12 DIAGNOSIS — Z42.1 AFTERCARE POSTMASTECTOMY FOR BREAST RECONSTRUCTION: ICD-10-CM

## 2020-10-12 DIAGNOSIS — Z15.09 BRCA1 GENETIC CARRIER: ICD-10-CM

## 2020-10-12 DIAGNOSIS — R60.0 EDEMA OF HAND: ICD-10-CM

## 2020-10-12 DIAGNOSIS — Z17.0 MALIGNANT NEOPLASM OF CENTRAL PORTION OF BOTH BREASTS IN FEMALE, ESTROGEN RECEPTOR POSITIVE (HCC): ICD-10-CM

## 2020-10-12 PROCEDURE — 97140 MANUAL THERAPY 1/> REGIONS: CPT | Performed by: PHYSICAL THERAPIST

## 2020-10-12 NOTE — THERAPY TREATMENT NOTE
Outpatient Physical Therapy Lymphedema Treatment Note  [facility]     Patient Name: Sravani Sky  : 1980  MRN: [id number]  Today's Date: 10/12/2020        Visit Date: 10/12/2020    Visit Dx:    ICD-10-CM ICD-9-CM   1. Postmastectomy lymphedema syndrome  I97.2 457.0   2. Malignant neoplasm of central portion of both breasts in female, estrogen receptor positive (CMS/HCC)  C50.111 174.1    Z17.0 V86.0    C50.112    3. S/P bilateral mastectomy  Z90.13 V45.71   4. Aftercare postmastectomy for breast reconstruction  Z42.1 V51.0   5. Edema of hand  R60.0 782.3   6. Right hand pain  M79.641 729.5   7. Osteopenia of lower leg, unspecified laterality  M85.869 733.90   8. BRCA1 genetic carrier  Z15.01 V84.01    Z15.09        Patient Active Problem List   Diagnosis   • Malignant neoplasm of female breast (CMS/HCC)   • BRCA1 genetic carrier   • Anemia   • Long term current use of aromatase inhibitor   • Osteopenia   • Diarrhea of presumed infectious origin   • Elevated transaminase level   • Fatty liver   • Diarrhea due to malabsorption   • Lymphocytic colitis        Lymphedema     Row Name 10/12/20 0707             Subjective Pain    Able to rate subjective pain?  yes  -SC      Pre-Treatment Pain Level  4  -SC         Subjective Comments    Subjective Comments  I have been doing worse. I feel it in my forearm some now. Not right now, mostly hand right now but this weekend felt like it was up in my arm. I did wear my full sleeve and glove each day. I didn't today because I was in a rush to get here. I did try the night wrapping and seemed to make my middle finger worse. I did get fitted for the pump and that felt good. I have to call back today to order my custom compression. I did have a couple of nights I didn't elevate my arm and I think that played a factor. I have been really stressed. And we did travel to [place] and back for my daughter's birthday so busy and travel might play a factor. I am not sure. We are  thinking about going to [place] for fall break so I would be gone for a week. I am not sure if that would help or hurt. I might be less stressed but then I wouldn't be coming here.   -SC         Manual Lymphatic Drainage    Manual Lymphatic Drainage  initial sequence;opened regional lymph nodes;opened anastamoses;extremity treatment  -SC      Initial Sequence  supraclavicular;shoulder collectors  -SC      Supraclavicular  right;left  -SC      Shoulder Collectors  right;left  -SC      Opened Regional Lymph Nodes  axillary;inguinal;ribs  -SC      Axillary  right;left  -SC      Inguinal  right  -SC      Opened Anastamoses  anterior axillo-axillary;axillo-inguinal  -SC      Axillo-Inguinal  right  -SC      Extremity Treatment  MLD to full limb;extremity treatment focus on  -SC      MLD to Full Limb  right  -SC      Extremity Treatment Focus On  right hand  -SC      Manual Therapy  reviewed elevation and self massage techniques. Has been fitted for pump. Awaiting insurance approval.   -SC         Compression/Skin Care    Compression/Skin Care  --  -SC      Skin Care  --  -SC      Wrapping Location  --  -SC      Wrapping Location UE  --  -SC      Bandaging Technique  --  -SC      Compression Garment Comments  Patient presents with OTS glove donned properly. Education of ordering products. Education of wrapping techniques.   -SC        User Key  (r) = Recorded By, (t) = Taken By, (c) = Cosigned By    Initials Name Provider Type    Sonja Delgado, PT Physical Therapist                        PT Assessment/Plan     Row Name 10/12/20 0707          PT Assessment    Assessment Comments  palplable and visible increased edema digits 2-4, markedly digit 3, however some resolution noted with MLD. Updated goals.  -SC        PT Plan    PT Plan Comments  assess custom garments when arrive, pump, possible fitting for sleeve pending progress, possible formal CDT with compression bandages pending progress  -SC       User Key  (r) =  Recorded By, (t) = Taken By, (c) = Cosigned By    Initials Name Provider Type    Sonja Delgado PT Physical Therapist             OP Exercises     Row Name 10/12/20 0707             Subjective Comments    Subjective Comments  I have been doing worse. I feel it in my forearm some now. Not right now, mostly hand right now but this weekend felt like it was up in my arm. I did wear my full sleeve and glove each day. I didn't today because I was in a rush to get here. I did try the night wrapping and seemed to make my middle finger worse. I did get fitted for the pump and that felt good. I have to call back today to order my custom compression. I did have a couple of nights I didn't elevate my arm and I think that played a factor. I have been really stressed. And we did travel to [place] and back for my daughter's birthday so busy and travel might play a factor. I am not sure. We are thinking about going to [place] for fall break so I would be gone for a week. I am not sure if that would help or hurt. I might be less stressed but then I wouldn't be coming here.   -SC         Subjective Pain    Able to rate subjective pain?  yes  -SC      Pre-Treatment Pain Level  4  -SC        User Key  (r) = Recorded By, (t) = Taken By, (c) = Cosigned By    Initials Name Provider Type    Sonja Delgado PT Physical Therapist                      PT OP Goals     Row Name 10/12/20 0707          PT Short Term Goals    STG Date to Achieve  10/22/20  -SC     STG 1  Patient independent and compliant with initial home exercise program focused on diaphragmatic breathing, range of motion, flexibility to decrease edema and improve lymphatic flow for decreased edema and decreased risk of infection.   -SC     STG 1 Progress  Met  -SC     STG 2  Patient independent and compliant with self-massage techniques with spouse/family member as needed for improved lymphatic drainage, decreased edema/symptoms, decreased risk of infection and improved  transition to self-care of condition.   -SC     STG 2 Progress  Progressing  -SC     STG 2 Progress Comments  reviewed today  -SC     STG 3  Patient independent and compliant with self-wrapping techniques of compression bandages with spouse/family member as indicated for improved self-management of condition.   -SC     STG 3 Progress  Progressing  -SC     STG 3 Progress Comments  states increased symptoms, modification provided, to try again this week  -SC        Long Term Goals    LTG Date to Achieve  12/24/20  -SC     LTG 1  Patient demonstrate decreased net edema of right upper extremity >/= 2-4 cm for decrease in edema, symptoms, decreased risk of infection and improved skin care/transition to self-care of condition.   -SC     LTG 1 Progress  Ongoing  -SC     LTG 2  Patient independent and compliant with home pneumatic compression pump for transition to self-care of condition.  -SC     LTG 2 Progress  Ongoing  -SC     LTG 2 Progress Comments  has been fitted, awaiting insurance coverage  -SC     LTG 3  Patient independent and compliant with daytime/night time OTS vs custom compression garments as indicated for decreased risk of lymphedema and infection and safety with transition of self-care of condition.   -SC     LTG 3 Progress  Progressing  -SC     LTG 3 Progress Comments  compliant with daytime OTS, to order custom daytime and night time today  -SC     LTG 4  Patient independent and compliant with advanced Home Exercise Program for self-management of condition.   -SC     LTG 4 Progress  Ongoing  -SC     LTG 5  Patient transition to cardiovascular exercise program (walking) >/=150 to 180 mins/week with moderate intensity for improved heart health, weight loss, decreased risk of osteoporosis and improved phase angle/metabolic rate.  -SC     LTG 5 Progress  Ongoing  -SC        Time Calculation    PT Goal Re-Cert Due Date  12/24/20  -SC       User Key  (r) = Recorded By, (t) = Taken By, (c) = Cosigned By     Initials Name Provider Type    SC Sonja Griffin, PT Physical Therapist          Therapy Education  Education Details: elevation, compression, travel, pump, self massage, wrapping, stress ball, continuation of care, prognosis  Given: HEP, Symptoms/condition management, Edema management, Bandaging/dressing change, Other (comment)  Program: Progressed, Modified  How Provided: Verbal, Demonstration  Provided to: Patient  Level of Understanding: Teach back education performed, Verbalized, Demonstrated              Time Calculation:   Start Time: 0707  Stop Time: 0745  Time Calculation (min): 38 min   Therapy Charges for Today     Code Description Service Date Service Provider Modifiers Qty    [id number]  PT MANUAL THERAPY EA 15 MIN 10/12/2020 Sonja Griffin, PT GP 3                    Sonja William PT  10/12/2020

## 2020-10-26 ENCOUNTER — HOSPITAL ENCOUNTER (OUTPATIENT)
Dept: PHYSICAL THERAPY | Facility: HOSPITAL | Age: 40
Setting detail: THERAPIES SERIES
Discharge: HOME | End: 2020-10-26

## 2020-10-26 ENCOUNTER — TELEPHONE (OUTPATIENT)
Dept: GASTROENTEROLOGY | Facility: CLINIC | Age: 40
End: 2020-10-26

## 2020-10-26 DIAGNOSIS — Z15.09 BRCA1 GENETIC CARRIER: ICD-10-CM

## 2020-10-26 DIAGNOSIS — C50.111 MALIGNANT NEOPLASM OF CENTRAL PORTION OF BOTH BREASTS IN FEMALE, ESTROGEN RECEPTOR POSITIVE (HCC): ICD-10-CM

## 2020-10-26 DIAGNOSIS — Z90.13 S/P BILATERAL MASTECTOMY: ICD-10-CM

## 2020-10-26 DIAGNOSIS — Z42.1 AFTERCARE POSTMASTECTOMY FOR BREAST RECONSTRUCTION: ICD-10-CM

## 2020-10-26 DIAGNOSIS — C50.112 MALIGNANT NEOPLASM OF CENTRAL PORTION OF BOTH BREASTS IN FEMALE, ESTROGEN RECEPTOR POSITIVE (HCC): ICD-10-CM

## 2020-10-26 DIAGNOSIS — R60.0 EDEMA OF HAND: ICD-10-CM

## 2020-10-26 DIAGNOSIS — M79.641 RIGHT HAND PAIN: ICD-10-CM

## 2020-10-26 DIAGNOSIS — I97.2 POSTMASTECTOMY LYMPHEDEMA SYNDROME: Primary | ICD-10-CM

## 2020-10-26 DIAGNOSIS — Z17.0 MALIGNANT NEOPLASM OF CENTRAL PORTION OF BOTH BREASTS IN FEMALE, ESTROGEN RECEPTOR POSITIVE (HCC): ICD-10-CM

## 2020-10-26 DIAGNOSIS — Z15.01 BRCA1 GENETIC CARRIER: ICD-10-CM

## 2020-10-26 DIAGNOSIS — M85.869 OSTEOPENIA OF LOWER LEG, UNSPECIFIED LATERALITY: ICD-10-CM

## 2020-10-26 PROCEDURE — 97140 MANUAL THERAPY 1/> REGIONS: CPT | Performed by: PHYSICAL THERAPIST

## 2020-10-26 NOTE — THERAPY TREATMENT NOTE
Outpatient Physical Therapy Lymphedema Progress Note  [facility]     Patient Name: Sravani Sky  : 1980  MRN: [id number]  Today's Date: 10/26/2020        Visit Date: 10/26/2020    Visit Dx:    ICD-10-CM ICD-9-CM   1. Postmastectomy lymphedema syndrome  I97.2 457.0   2. Malignant neoplasm of central portion of both breasts in female, estrogen receptor positive (CMS/HCC)  C50.111 174.1    Z17.0 V86.0    C50.112    3. S/P bilateral mastectomy  Z90.13 V45.71   4. Aftercare postmastectomy for breast reconstruction  Z42.1 V51.0   5. Edema of hand  R60.0 782.3   6. Right hand pain  M79.641 729.5   7. Osteopenia of lower leg, unspecified laterality  M85.869 733.90   8. BRCA1 genetic carrier  Z15.01 V84.01    Z15.09        Patient Active Problem List   Diagnosis   • Malignant neoplasm of female breast (CMS/HCC)   • BRCA1 genetic carrier   • Anemia   • Long term current use of aromatase inhibitor   • Osteopenia   • Diarrhea of presumed infectious origin   • Elevated transaminase level   • Fatty liver   • Diarrhea due to malabsorption   • Lymphocytic colitis        Lymphedema     Row Name 10/26/20 0747             Lymphedema Assessment    Lymphedema Classification  RUE:;stage 1 (Spontaneously Reversible);LUE:;at risk/stage 0;secondary  -SC      Lymphedema Cancer Related Sx  bilateral;simple mastectomy;sentinel node biopsy;reconstructive;right;axillary dissection;hysterectomy  -SC      Lymphedema Surgery Comments  2014 left mediport; 2014 R TM, SLNB L TM SLBN, B TE; 14 excision R axillary mass/removal of port; 2015 second stage reconstruction expanders to implants; 06/15/2015 removal left deflated implant, new implant; 2019 right capsular contracture plastic surgery  -SC      Lymph Nodes Removed #  5 R 0/2; 0/3 L 0/2  -SC      Positive Lymph Nodes #  0  -SC      Cancer Comments  triple negative  -SC      Chemo Received  yes  -SC      Chemo Treatments #/Timeframe  NAC  to   2014  -SC      Radiation Therapy Received  yes  -SC      Radiation Treatments #/Timeframe  R chest/axilla completed Oct 2014  -SC      Infections or Cellulitis?  no  -SC      Lymphedema Assessment Comments  stage 1 lymphedema right hand currently, mostly noted 3rd digit  -SC         Lymphedema Edema Assessment    Ptting Edema Category  By grade out of 4  -SC      Pitting Edema  + 1/4 (+1 of 4) negative pitting noted today  -SC      Edema Assessment Comment  mild edema dorsal distal hand at MCPs of 2-4 and 3rd digit proximally  -SC         Skin Changes/Observations    Skin Observations Comment  intact  -SC         Lymphedema Sensation    Lymphedema Sensation Tests  light touch  -SC      Lymphedema Light Touch  RUE:;mild impairment  -SC         Lymphedema Pulses/Capillary Refill    Lymph Pulses Capillary Refill Comments  intact  -SC         Manual Lymphatic Drainage    Manual Lymphatic Drainage  initial sequence;opened regional lymph nodes;opened anastamoses;extremity treatment  -SC      Initial Sequence  supraclavicular;shoulder collectors  -SC      Supraclavicular  right;left  -SC      Shoulder Collectors  right;left  -SC      Opened Regional Lymph Nodes  axillary;inguinal;ribs  -SC      Axillary  right;left  -SC      Inguinal  right  -SC      Opened Anastamoses  anterior axillo-axillary;axillo-inguinal  -SC      Axillo-Inguinal  right  -SC      Extremity Treatment  MLD to full limb;extremity treatment focus on  -SC      MLD to Full Limb  right  -SC      Extremity Treatment Focus On  right hand  -SC      Manual Therapy  reviewed self massage and pump  -SC         Compression/Skin Care    Compression Garment Comments  Patient presented with new custom daytime glove extended to mid forearm with proper fit however hole in product at webspace, to order new garment due to malfunction, awaiting custom night garment  -SC        User Key  (r) = Recorded By, (t) = Taken By, (c) = Cosigned By    Initials Name Provider Type     Sonja Delgado, PT Physical Therapist            PT Ortho     Row Name 10/26/20 0747       Subjective Comments    Subjective Comments  We went to [place] for fall break and very relaxing. Wore sleeve and glove for the drive. The glove all the rest of the time. Barely took it off even at the beach. Did wear my night garment. I got my new custom daytime glove yesterday. Put it on, has a hole in it. Very disappointed. I haven't gotten the night one. Working with pump company because it would be over $1000. I cannot afford that at this time. I have been trying to contact my insurance too to see where I am with my deductible to try to get more product as I am able.   -SC       Precautions and Contraindications    Precautions/Limitations  other (see comments)  -SC    Precautions  bilateral implants  -SC    Contraindications  B SLNB, no IV/BP (R UE), no modalities, triple negative  -SC       Subjective Pain    Able to rate subjective pain?  yes  -SC    Pre-Treatment Pain Level  3  -SC       Posture/Observations    Posture- WNL  Posture is WNL  -SC       General ROM    GENERAL ROM COMMENTS  B UEs WNLs  -SC       MMT (Manual Muscle Testing)    General MMT Comments  B UEs WNLs  -SC       Gait/Stairs (Locomotion)    Comment (Gait/Stairs)  normal  -SC      User Key  (r) = Recorded By, (t) = Taken By, (c) = Cosigned By    Initials Name Provider Type    SC Sonja Griffin, PT Physical Therapist                  PT Assessment/Plan     Row Name 10/26/20 0747          PT Assessment    Functional Limitations  Limitations in community activities;Limitation in home management;Performance in leisure activities;Performance in self-care ADL;Performance in sport activities;Performance in work activities  -SC     Impairments  Edema;Impaired lymphatic circulation;Integumentary integrity;Pain  -SC     Assessment Comments  Mrs. Sky is a pleasant 40 year old female, history of BRCA (+) bilateral breast cancer, triple negative, left  mediport placement in 2014, completed NAC 2014, s/p bilateral mastectomy with sentinel node biopsy bilateral with immediate TE reconstruction 2014, right axillary reexcision 2014, completed radiation 2014 to right chest wall/axilla, developed post mastectomy lymphedema syndrome right hand, completed formal therapy in lymphedema clinic until 2016, had second stage reconstruction in 2015, with additional surgeries for reconstruction of breast in 2015 and 2019, port removal in 2014, hysterectomy, returned 09/24/2020 with noted return of lymphedema right hand with right hand pain insidious onset in July 2020, presented with marked worn out garments, no new products since 2016. Measureable and visible edema right hand, s/s consistent with post mastectomy lymphedema syndrome. Working currently as school psychologist. Re-established care, currently attended 5 formal therapy sessions including evaluation in Sept. Progressing well at this time however awaiting updated refitted products, daytime and night time custom garments and attempting to get home pneumatic compression pump approved by insurance. Will continue with weekly therapy at this time for optimal care and return to self management of condition effectively.  -SC        PT Plan    Predicted Duration of Therapy Intervention (PT)  10 sessions  -SC     PT Plan Comments  assess custom garments when arrive, pump, possible fitting full OTS vs custom datyime sleeve, formal CDT if indicated pending progress (compression bandages)  -SC       User Key  (r) = Recorded By, (t) = Taken By, (c) = Cosigned By    Initials Name Provider Type    Sonja Delgado, PT Physical Therapist             OP Exercises     Row Name 10/26/20 8860             Subjective Comments    Subjective Comments  We went to [place] for fall break and very relaxing. Wore sleeve and glove for the drive. The glove all the rest of the time. Barely took it off even at the beach. Did wear my night garment. I  got my new custom daytime glove yesterday. Put it on, has a hole in it. Very disappointed. I haven't gotten the night one. Working with pump company because it would be over $1000. I cannot afford that at this time. I have been trying to contact my insurance too to see where I am with my deductible to try to get more product as I am able.   -SC         Subjective Pain    Able to rate subjective pain?  yes  -SC      Pre-Treatment Pain Level  3  -SC        User Key  (r) = Recorded By, (t) = Taken By, (c) = Cosigned By    Initials Name Provider Type    SC Sonja Griffin, PT Physical Therapist                      PT OP Goals     Row Name 10/26/20 0747          PT Short Term Goals    STG 1  Patient independent and compliant with initial home exercise program focused on diaphragmatic breathing, range of motion, flexibility to decrease edema and improve lymphatic flow for decreased edema and decreased risk of infection.   -SC     STG 1 Progress  Met  -SC     STG 2  Patient independent and compliant with self-massage techniques with spouse/family member as needed for improved lymphatic drainage, decreased edema/symptoms, decreased risk of infection and improved transition to self-care of condition.   -SC     STG 2 Progress  Met  -SC     STG 3  Patient independent and compliant with self-wrapping techniques of compression bandages with spouse/family member as indicated for improved self-management of condition.   -SC     STG 3 Progress  Met  -SC        Long Term Goals    LTG Date to Achieve  12/24/20  -SC     LTG 1  Patient demonstrate decreased net edema of right upper extremity >/= 2-4 cm for decrease in edema, symptoms, decreased risk of infection and improved skin care/transition to self-care of condition.   -SC     LTG 1 Progress  Progressing  -SC     LTG 1 Progress Comments  negative measureable difference in hands currently, 3rd digit persisting with edema proximally  -SC     LTG 2  Patient independent and  compliant with home pneumatic compression pump for transition to self-care of condition.  -SC     LTG 2 Progress  Ongoing  -SC     LTG 2 Progress Comments  awaiting insurance approval  -SC     LTG 3  Patient independent and compliant with daytime/night time OTS vs custom compression garments as indicated for decreased risk of lymphedema and infection and safety with transition of self-care of condition.   -SC     LTG 3 Progress  Progressing  -SC     LTG 3 Progress Comments  compliant with daytime, compliant with current night time, awaiting new night time to improve fit  -SC     LTG 4  Patient independent and compliant with advanced Home Exercise Program for self-management of condition.   -SC     LTG 4 Progress  Progressing  -SC     LTG 5  Patient transition to cardiovascular exercise program (walking) >/=150 to 180 mins/week with moderate intensity for improved heart health, weight loss, decreased risk of osteoporosis and improved phase angle/metabolic rate.  -SC     LTG 5 Progress  Ongoing  -SC        Time Calculation    PT Goal Re-Cert Due Date  12/24/20  -SC       User Key  (r) = Recorded By, (t) = Taken By, (c) = Cosigned By    Initials Name Provider Type    Sonja Delgado, PT Physical Therapist          Therapy Education  Education Details: products, pumps, continuation of care, elevation, self massage  Given: Symptoms/condition management, Edema management, HEP, Pain management  Program: Reinforced  How Provided: Verbal  Provided to: Patient  Level of Understanding: Verbalized    Outcome Measure Options: Quick DASH  Quick DASH  Open a tight or new jar.: Severe Difficulty  Do heavy household chores (e.g., wash walls, wash floors): Moderate Difficulty  Carry a shopping bag or briefcase: No Difficulty  Wash your back: No Difficulty  Use a knife to cut food: No Difficulty  Recreational activities in which you take some force or impact through your arm, should or hand (e.g. golf, hammering, tennis, etc.):  Severe Difficulty  During the past week, to what extent has your arm, shoulder, or hand problem interfered with your normal social activites with family, friends, neighbors or groups?: Not at all  During the past week, were you limited in your work or other regular daily activities as a result of your arm, shoulder or hand problem?: Moderately Limited  Arm, Shoulder, or hand pain: Mild  Tingling (pins and needles) in your arm, shoulder, or hand: None  During the past week, how much difficulty have you had sleeping because of the pain in your arm, shoulder or hand?: Moderate Difficiculty  Number of Questions Answered: 11  Quick DASH Score: 29.55         Time Calculation:   Start Time: 0747  Stop Time: 0832  Time Calculation (min): 45 min   Therapy Charges for Today     Code Description Service Date Service Provider Modifiers Qty    [id number] HC PT MANUAL THERAPY EA 15 MIN 10/26/2020 Sonja Griffin, PT GP 3          PT G-Codes  Outcome Measure Options: Quick DASH  Quick DASH Score: 29.55         Sonja William, LEE  10/26/2020

## 2020-10-27 ENCOUNTER — OFFICE VISIT (OUTPATIENT)
Dept: GASTROENTEROLOGY | Facility: CLINIC | Age: 40
End: 2020-10-27

## 2020-10-27 VITALS — BODY MASS INDEX: 25.72 KG/M2 | HEIGHT: 60 IN | WEIGHT: 131 LBS | TEMPERATURE: 96.9 F

## 2020-10-27 DIAGNOSIS — K52.839 MICROSCOPIC COLITIS, UNSPECIFIED MICROSCOPIC COLITIS TYPE: Primary | ICD-10-CM

## 2020-10-27 DIAGNOSIS — K76.0 FATTY LIVER: ICD-10-CM

## 2020-10-27 DIAGNOSIS — R74.01 ELEVATED TRANSAMINASE LEVEL: ICD-10-CM

## 2020-10-27 DIAGNOSIS — R19.7 DIARRHEA, UNSPECIFIED TYPE: ICD-10-CM

## 2020-10-27 DIAGNOSIS — R15.2 FECAL URGENCY: ICD-10-CM

## 2020-10-27 PROCEDURE — 99214 OFFICE O/P EST MOD 30 MIN: CPT | Performed by: NURSE PRACTITIONER

## 2020-10-27 RX ORDER — BISMUTH SUBSALICYLATE 262 MG
524 TABLET,CHEWABLE ORAL 3 TIMES DAILY
Qty: 175 TABLET | Refills: 0 | Status: SHIPPED | OUTPATIENT
Start: 2020-10-27 | End: 2021-12-06

## 2020-10-27 NOTE — PROGRESS NOTES
Chief Complaint   Patient presents with   • Follow-up     flare       Sravani Sky is a  40 y.o. female here for a follow up visit for diarrhea.    HPI  41 yo f presents today for follow up visit for diarrhea. She is a patient of Dr. Sales. She was last seen in the office on 9/12/19. She was diagnosed last year with microscopic colitis/lymphocytic colitis. She was initially treated with budesonide but unfortunately had terrible side effects including severe swelling of her hands and feet.  She was then switched over to a low-dose prednisone taper and this seemed to do the trick.  She was really good for about 6 months.  Then unfortunately all of her same symptoms returned lots of gas bloating fecal urgency incontinence and diarrhea.  It always happens first thing in the morning.  She does me she will have several episodes first thing in the morning.  She happened to have another episode again 6 months after that time and she needed another round of prednisone.  And again it completely resolved.  She is a breast cancer survivor and is on several medications that she wonders could be contributing to these episodes.  She did get diagnosed with breast cancer in 2013 and underwent a double mastectomy and chemo with radiation.  She is followed by a oncologist routinely.  She tells me for the past week all of her same symptoms have returned.  So she tells me it has been again another 6 months and here we are again with the same symptoms.  She denies any recent antibiotics.  She denies any new medications.  Nobody else at her home is sick.  She is tried taking Imodium over-the-counter and this really does not do anything.  She does not recall trying Pepto-Bismol.  She also has history of fatty liver disease and has been found to have elevated ALT and AST.  She needs to have a complete liver work-up done at some point with Dr. Sales.  She denies any dysphagia, reflux, nausea and vomiting, constipation, rectal bleeding or  melena.  She notes appetite is good and her weight has gone up 20 pounds since November of last year.  Patient believes his weight gain is all due to the prednisone.  Past Medical History:   Diagnosis Date   • Cancer (CMS/HCC)     Bilateral breast cancer; diagnosed 2013   • Diarrhea     x3 weeks   • Heart burn    • History of radiation therapy    • Lymphedema     Right hand and wrist   • Slow to wake up after anesthesia     mother       Past Surgical History:   Procedure Laterality Date   • BREAST AUGMENTATION Right 2019    Procedure: RIGHT CAPSULOTOMY;  Surgeon: MADELYN Arreola MD;  Location: [facility];  Service: Plastics   • BREAST IMPLANT SURGERY Left    • BREAST SURGERY Bilateral 2014    Mastectomy   • BREAST TISSUE EXPANDER REMOVAL INSERTION OF IMPLANT     •  SECTION  , 2010    X2   • COLONOSCOPY N/A 2019    Hypertrophied anal papilla, [facility]   • FAT GRAFTING Right 2019    Procedure: RIGHT FAT GRAFTING WITH REVOLVE;  Surgeon: MADELYN Arreola MD;  Location: [facility];  Service: Plastics   • HYSTERECTOMY      Total       Scheduled Meds:    Continuous Infusions:No current facility-administered medications for this visit.       PRN Meds:.    Allergies   Allergen Reactions   • No Known Drug Allergy        Social History     Socioeconomic History   • Marital status:      Spouse name: En   • Number of children: 2   • Years of education: College   • Highest education level: Not on file   Occupational History   • Occupation: Psychologist     Employer: [employer]   Tobacco Use   • Smoking status: Never Smoker   • Smokeless tobacco: Never Used   Substance and Sexual Activity   • Alcohol use: No   • Drug use: No   • Sexual activity: Defer     Partners: Male     Comment:        Family History   Problem Relation Age of Onset   • No Known Problems Mother    • No Known Problems Father    • Prostate cancer Maternal Uncle 54   • Breast cancer  Paternal Aunt 40   • Breast cancer Maternal Grandmother 58   • Breast cancer Maternal Aunt         In her 50s   • Breast cancer Maternal Aunt         In her 50s   • Heart disease Other    • Hypertension Other    • Malig Hyperthermia Neg Hx        Review of Systems   Constitutional: Negative for appetite change, chills, diaphoresis, fatigue, fever and unexpected weight change.   HENT: Negative for nosebleeds, postnasal drip, sore throat, trouble swallowing and voice change.    Respiratory: Negative for cough, choking, chest tightness, shortness of breath and wheezing.    Cardiovascular: Negative for chest pain, palpitations and leg swelling.   Gastrointestinal: Positive for abdominal distention, abdominal pain and diarrhea. Negative for anal bleeding, blood in stool, constipation, nausea, rectal pain and vomiting.   Endocrine: Negative for polydipsia, polyphagia and polyuria.   Musculoskeletal: Negative for gait problem.   Skin: Negative for rash and wound.   Allergic/Immunologic: Negative for food allergies.   Neurological: Negative for dizziness, speech difficulty and light-headedness.   Psychiatric/Behavioral: Negative for confusion, self-injury, sleep disturbance and suicidal ideas.       Vitals:    10/27/20 1416   Temp: 96.9 °F (36.1 °C)       Physical Exam  Constitutional:       General: She is not in acute distress.     Appearance: She is well-developed. She is not ill-appearing.   HENT:      Head: Normocephalic.   Eyes:      Pupils: Pupils are equal, round, and reactive to light.   Cardiovascular:      Rate and Rhythm: Normal rate and regular rhythm.      Heart sounds: Normal heart sounds.   Pulmonary:      Effort: Pulmonary effort is normal.      Breath sounds: Normal breath sounds.   Abdominal:      General: Bowel sounds are normal. There is distension.      Palpations: Abdomen is soft. There is no mass.      Tenderness: There is no abdominal tenderness. There is no guarding or rebound.      Hernia: No  hernia is present.   Musculoskeletal: Normal range of motion.   Skin:     General: Skin is warm and dry.   Neurological:      Mental Status: She is alert and oriented to person, place, and time.   Psychiatric:         Speech: Speech normal.         Behavior: Behavior normal.         Judgment: Judgment normal.         No radiology results for the last 7 days     Assessment & Plan     1. Microscopic colitis, unspecified microscopic colitis type  - Bismuth 262 MG chewable tablet; Chew 2 tablets 3 (Three) Times a Day.  Dispense: 175 tablet; Refill: 0    2. Diarrhea, unspecified type    3. Fecal urgency    4. Fatty liver    5. Elevated transaminase level    We had a long discussion today about the different treatment options we could try for her microscopic colitis.  She really wants to avoid prednisone again if at all possible.  Just due to the all the side effects and especially the weight gain.  She does not recall trying Pepto-Bismol chewables in the past.  So for now that is what were going to try.  She can try to chewables 3 times a day for 30 days.  I want her to give me an update in 2 weeks.  Patient to follow-up with me in 1 month.  Patient to call the office with any issues.

## 2020-11-02 ENCOUNTER — LAB (OUTPATIENT)
Dept: OTHER | Facility: HOSPITAL | Age: 40
End: 2020-11-02

## 2020-11-02 ENCOUNTER — OFFICE VISIT (OUTPATIENT)
Dept: ONCOLOGY | Facility: CLINIC | Age: 40
End: 2020-11-02

## 2020-11-02 VITALS
RESPIRATION RATE: 16 BRPM | HEIGHT: 60 IN | DIASTOLIC BLOOD PRESSURE: 69 MMHG | OXYGEN SATURATION: 100 % | BODY MASS INDEX: 25.91 KG/M2 | WEIGHT: 132 LBS | HEART RATE: 86 BPM | TEMPERATURE: 97.7 F | SYSTOLIC BLOOD PRESSURE: 93 MMHG

## 2020-11-02 DIAGNOSIS — C50.111 MALIGNANT NEOPLASM OF CENTRAL PORTION OF BOTH BREASTS IN FEMALE, ESTROGEN RECEPTOR POSITIVE (HCC): Primary | ICD-10-CM

## 2020-11-02 DIAGNOSIS — Z79.811 LONG TERM CURRENT USE OF AROMATASE INHIBITOR: ICD-10-CM

## 2020-11-02 DIAGNOSIS — Z15.09 BRCA1 GENETIC CARRIER: ICD-10-CM

## 2020-11-02 DIAGNOSIS — Z17.0 MALIGNANT NEOPLASM OF CENTRAL PORTION OF BOTH BREASTS IN FEMALE, ESTROGEN RECEPTOR POSITIVE (HCC): Primary | ICD-10-CM

## 2020-11-02 DIAGNOSIS — Z17.0 MALIGNANT NEOPLASM OF CENTRAL PORTION OF BOTH BREASTS IN FEMALE, ESTROGEN RECEPTOR POSITIVE (HCC): ICD-10-CM

## 2020-11-02 DIAGNOSIS — C50.112 MALIGNANT NEOPLASM OF CENTRAL PORTION OF BOTH BREASTS IN FEMALE, ESTROGEN RECEPTOR POSITIVE (HCC): Primary | ICD-10-CM

## 2020-11-02 DIAGNOSIS — C50.112 MALIGNANT NEOPLASM OF CENTRAL PORTION OF BOTH BREASTS IN FEMALE, ESTROGEN RECEPTOR POSITIVE (HCC): ICD-10-CM

## 2020-11-02 DIAGNOSIS — Z15.01 BRCA1 GENETIC CARRIER: ICD-10-CM

## 2020-11-02 DIAGNOSIS — C50.111 MALIGNANT NEOPLASM OF CENTRAL PORTION OF BOTH BREASTS IN FEMALE, ESTROGEN RECEPTOR POSITIVE (HCC): ICD-10-CM

## 2020-11-02 LAB
ALBUMIN SERPL-MCNC: 3.6 G/DL (ref 3.5–5.2)
ALBUMIN/GLOB SERPL: 1.4 G/DL
ALP SERPL-CCNC: 50 U/L (ref 39–117)
ALT SERPL W P-5'-P-CCNC: 32 U/L (ref 1–33)
ANION GAP SERPL CALCULATED.3IONS-SCNC: 7.2 MMOL/L (ref 5–15)
AST SERPL-CCNC: 43 U/L (ref 1–32)
BASOPHILS # BLD AUTO: 0.02 10*3/MM3 (ref 0–0.2)
BASOPHILS NFR BLD AUTO: 0.4 % (ref 0–1.5)
BILIRUB SERPL-MCNC: <0.2 MG/DL (ref 0–1.2)
BUN SERPL-MCNC: 7 MG/DL (ref 6–20)
BUN/CREAT SERPL: 14.9 (ref 7–25)
CALCIUM SPEC-SCNC: 8.2 MG/DL (ref 8.6–10.5)
CANCER AG125 SERPL QL: 3.4 U/ML (ref 0–38.1)
CHLORIDE SERPL-SCNC: 108 MMOL/L (ref 98–107)
CO2 SERPL-SCNC: 26.8 MMOL/L (ref 22–29)
CREAT SERPL-MCNC: 0.47 MG/DL (ref 0.57–1)
DEPRECATED RDW RBC AUTO: 47.4 FL (ref 37–54)
EOSINOPHIL # BLD AUTO: 0.08 10*3/MM3 (ref 0–0.4)
EOSINOPHIL NFR BLD AUTO: 1.4 % (ref 0.3–6.2)
ERYTHROCYTE [DISTWIDTH] IN BLOOD BY AUTOMATED COUNT: 15.5 % (ref 12.3–15.4)
GFR SERPL CREATININE-BSD FRML MDRD: 147 ML/MIN/1.73
GLOBULIN UR ELPH-MCNC: 2.6 GM/DL
GLUCOSE SERPL-MCNC: 93 MG/DL (ref 65–99)
HCT VFR BLD AUTO: 35.7 % (ref 34–46.6)
HGB BLD-MCNC: 11.5 G/DL (ref 12–15.9)
IMM GRANULOCYTES # BLD AUTO: 0.02 10*3/MM3 (ref 0–0.05)
IMM GRANULOCYTES NFR BLD AUTO: 0.4 % (ref 0–0.5)
LYMPHOCYTES # BLD AUTO: 1.06 10*3/MM3 (ref 0.7–3.1)
LYMPHOCYTES NFR BLD AUTO: 19 % (ref 19.6–45.3)
MCH RBC QN AUTO: 27.4 PG (ref 26.6–33)
MCHC RBC AUTO-ENTMCNC: 32.2 G/DL (ref 31.5–35.7)
MCV RBC AUTO: 85 FL (ref 79–97)
MONOCYTES # BLD AUTO: 0.52 10*3/MM3 (ref 0.1–0.9)
MONOCYTES NFR BLD AUTO: 9.3 % (ref 5–12)
NEUTROPHILS NFR BLD AUTO: 3.87 10*3/MM3 (ref 1.7–7)
NEUTROPHILS NFR BLD AUTO: 69.5 % (ref 42.7–76)
NRBC BLD AUTO-RTO: 0 /100 WBC (ref 0–0.2)
PLATELET # BLD AUTO: 329 10*3/MM3 (ref 140–450)
PMV BLD AUTO: 8.9 FL (ref 6–12)
POTASSIUM SERPL-SCNC: 4.3 MMOL/L (ref 3.5–5.2)
PROT SERPL-MCNC: 6.2 G/DL (ref 6–8.5)
RBC # BLD AUTO: 4.2 10*6/MM3 (ref 3.77–5.28)
SODIUM SERPL-SCNC: 142 MMOL/L (ref 136–145)
T4 FREE SERPL-MCNC: 0.92 NG/DL (ref 0.93–1.7)
TSH SERPL DL<=0.05 MIU/L-ACNC: 2.02 UIU/ML (ref 0.27–4.2)
WBC # BLD AUTO: 5.57 10*3/MM3 (ref 3.4–10.8)

## 2020-11-02 PROCEDURE — 80053 COMPREHEN METABOLIC PANEL: CPT | Performed by: INTERNAL MEDICINE

## 2020-11-02 PROCEDURE — 99214 OFFICE O/P EST MOD 30 MIN: CPT | Performed by: INTERNAL MEDICINE

## 2020-11-02 PROCEDURE — 36415 COLL VENOUS BLD VENIPUNCTURE: CPT

## 2020-11-02 PROCEDURE — 84439 ASSAY OF FREE THYROXINE: CPT | Performed by: INTERNAL MEDICINE

## 2020-11-02 PROCEDURE — 85025 COMPLETE CBC W/AUTO DIFF WBC: CPT | Performed by: INTERNAL MEDICINE

## 2020-11-02 PROCEDURE — 84443 ASSAY THYROID STIM HORMONE: CPT | Performed by: INTERNAL MEDICINE

## 2020-11-02 PROCEDURE — 86304 IMMUNOASSAY TUMOR CA 125: CPT | Performed by: INTERNAL MEDICINE

## 2020-11-03 ENCOUNTER — APPOINTMENT (OUTPATIENT)
Dept: PHYSICAL THERAPY | Facility: HOSPITAL | Age: 40
End: 2020-11-03

## 2020-11-03 ENCOUNTER — TELEPHONE (OUTPATIENT)
Dept: ONCOLOGY | Facility: CLINIC | Age: 40
End: 2020-11-03

## 2020-11-04 ENCOUNTER — HOSPITAL ENCOUNTER (OUTPATIENT)
Dept: PHYSICAL THERAPY | Facility: HOSPITAL | Age: 40
Setting detail: THERAPIES SERIES
Discharge: HOME | End: 2020-11-04

## 2020-11-04 DIAGNOSIS — Z90.13 S/P BILATERAL MASTECTOMY: ICD-10-CM

## 2020-11-04 DIAGNOSIS — C50.111 MALIGNANT NEOPLASM OF CENTRAL PORTION OF BOTH BREASTS IN FEMALE, ESTROGEN RECEPTOR POSITIVE (HCC): ICD-10-CM

## 2020-11-04 DIAGNOSIS — I97.2 POSTMASTECTOMY LYMPHEDEMA SYNDROME: Primary | ICD-10-CM

## 2020-11-04 DIAGNOSIS — Z15.01 BRCA1 GENETIC CARRIER: ICD-10-CM

## 2020-11-04 DIAGNOSIS — Z17.0 MALIGNANT NEOPLASM OF CENTRAL PORTION OF BOTH BREASTS IN FEMALE, ESTROGEN RECEPTOR POSITIVE (HCC): ICD-10-CM

## 2020-11-04 DIAGNOSIS — M85.869 OSTEOPENIA OF LOWER LEG, UNSPECIFIED LATERALITY: ICD-10-CM

## 2020-11-04 DIAGNOSIS — C50.112 MALIGNANT NEOPLASM OF CENTRAL PORTION OF BOTH BREASTS IN FEMALE, ESTROGEN RECEPTOR POSITIVE (HCC): ICD-10-CM

## 2020-11-04 DIAGNOSIS — Z15.09 BRCA1 GENETIC CARRIER: ICD-10-CM

## 2020-11-04 DIAGNOSIS — M79.641 RIGHT HAND PAIN: ICD-10-CM

## 2020-11-04 DIAGNOSIS — R60.0 EDEMA OF HAND: ICD-10-CM

## 2020-11-04 DIAGNOSIS — Z42.1 AFTERCARE POSTMASTECTOMY FOR BREAST RECONSTRUCTION: ICD-10-CM

## 2020-11-04 PROCEDURE — 97140 MANUAL THERAPY 1/> REGIONS: CPT | Performed by: PHYSICAL THERAPIST

## 2020-11-06 ENCOUNTER — APPOINTMENT (OUTPATIENT)
Dept: ULTRASOUND IMAGING | Facility: HOSPITAL | Age: 40
End: 2020-11-06

## 2020-11-10 ENCOUNTER — HOSPITAL ENCOUNTER (OUTPATIENT)
Dept: PHYSICAL THERAPY | Facility: HOSPITAL | Age: 40
Setting detail: THERAPIES SERIES
Discharge: HOME | End: 2020-11-10

## 2020-11-10 DIAGNOSIS — C50.111 MALIGNANT NEOPLASM OF CENTRAL PORTION OF BOTH BREASTS IN FEMALE, ESTROGEN RECEPTOR POSITIVE (HCC): ICD-10-CM

## 2020-11-10 DIAGNOSIS — R60.0 EDEMA OF HAND: ICD-10-CM

## 2020-11-10 DIAGNOSIS — M85.869 OSTEOPENIA OF LOWER LEG, UNSPECIFIED LATERALITY: ICD-10-CM

## 2020-11-10 DIAGNOSIS — I97.2 POSTMASTECTOMY LYMPHEDEMA SYNDROME: Primary | ICD-10-CM

## 2020-11-10 DIAGNOSIS — M79.641 RIGHT HAND PAIN: ICD-10-CM

## 2020-11-10 DIAGNOSIS — Z15.01 BRCA1 GENETIC CARRIER: ICD-10-CM

## 2020-11-10 DIAGNOSIS — Z15.09 BRCA1 GENETIC CARRIER: ICD-10-CM

## 2020-11-10 DIAGNOSIS — C50.112 MALIGNANT NEOPLASM OF CENTRAL PORTION OF BOTH BREASTS IN FEMALE, ESTROGEN RECEPTOR POSITIVE (HCC): ICD-10-CM

## 2020-11-10 DIAGNOSIS — Z17.0 MALIGNANT NEOPLASM OF CENTRAL PORTION OF BOTH BREASTS IN FEMALE, ESTROGEN RECEPTOR POSITIVE (HCC): ICD-10-CM

## 2020-11-10 DIAGNOSIS — Z42.1 AFTERCARE POSTMASTECTOMY FOR BREAST RECONSTRUCTION: ICD-10-CM

## 2020-11-10 DIAGNOSIS — Z90.13 S/P BILATERAL MASTECTOMY: ICD-10-CM

## 2020-11-10 PROCEDURE — 97110 THERAPEUTIC EXERCISES: CPT | Performed by: PHYSICAL THERAPIST

## 2020-11-10 PROCEDURE — 97140 MANUAL THERAPY 1/> REGIONS: CPT | Performed by: PHYSICAL THERAPIST

## 2020-11-10 NOTE — THERAPY TREATMENT NOTE
Outpatient Physical Therapy Lymphedema Treatment Note  [facility]     Patient Name: Sravani Sky  : 1980  MRN: [id number]  Today's Date: 11/10/2020        Visit Date: 11/10/2020    Visit Dx:    ICD-10-CM ICD-9-CM   1. Postmastectomy lymphedema syndrome  I97.2 457.0   2. Malignant neoplasm of central portion of both breasts in female, estrogen receptor positive (CMS/HCC)  C50.111 174.1    Z17.0 V86.0    C50.112    3. S/P bilateral mastectomy  Z90.13 V45.71   4. Aftercare postmastectomy for breast reconstruction  Z42.1 V51.0   5. Edema of hand  R60.0 782.3   6. Right hand pain  M79.641 729.5   7. Osteopenia of lower leg, unspecified laterality  M85.869 733.90   8. BRCA1 genetic carrier  Z15.01 V84.01    Z15.09        Patient Active Problem List   Diagnosis   • Malignant neoplasm of female breast (CMS/HCC)   • BRCA1 genetic carrier   • Anemia   • Long term current use of aromatase inhibitor   • Osteopenia   • Diarrhea of presumed infectious origin   • Elevated transaminase level   • Fatty liver   • Diarrhea due to malabsorption   • Lymphocytic colitis        Lymphedema     Row Name 11/10/20 0747             Subjective Pain    Able to rate subjective pain?  yes  -SC      Pre-Treatment Pain Level  0  -SC         Subjective Comments    Subjective Comments  Yesterday was not a good day. End of the day the back of my hand was swollen too. I feel like the daytime keeps it from getting worse but maybe doesn't make it better. The pump and the night garment really help. I am best first thing in the AM. I think yesterday I was actually less active, sitting more at work.   -SC         Manual Lymphatic Drainage    Manual Lymphatic Drainage  initial sequence;opened regional lymph nodes;opened anastamoses;extremity treatment  -SC      Initial Sequence  supraclavicular;shoulder collectors  -SC      Supraclavicular  right;left  -SC      Shoulder Collectors  right;left  -SC      Opened Regional Lymph Nodes   axillary;inguinal;ribs  -SC      Axillary  right;left  -SC      Inguinal  right  -SC      Opened Anastamoses  anterior axillo-axillary;axillo-inguinal  -SC      Axillo-Inguinal  right  -SC      Extremity Treatment  MLD to full limb;extremity treatment focus on  -SC      MLD to Full Limb  right  -SC      Extremity Treatment Focus On  right hand/fingers  -SC      Manual Therapy  compliant with self massage and pump  -SC         Compression/Skin Care    Compression Garment Comments  presents with custom glove donned properly, removed for session, redonned prior to leaving facility. compliant with night garment.  -SC        User Key  (r) = Recorded By, (t) = Taken By, (c) = Cosigned By    Initials Name Provider Type    Sonja Delgado, PT Physical Therapist                        PT Assessment/Plan     Row Name 11/10/20 0747          PT Assessment    Assessment Comments  updated HEP, to increase night garment use end of day to assist with symptoms.  -SC        PT Plan    PT Plan Comments  continue manual, circumferential, walking program, discussion of continuation of care or addition of more products.  -SC       User Key  (r) = Recorded By, (t) = Taken By, (c) = Cosigned By    Initials Name Provider Type    Sonja Delgado, PT Physical Therapist             OP Exercises     Row Name 11/10/20 5838             Subjective Comments    Subjective Comments  Yesterday was not a good day. End of the day the back of my hand was swollen too. I feel like the daytime keeps it from getting worse but maybe doesn't make it better. The pump and the night garment really help. I am best first thing in the AM. I think yesterday I was actually less active, sitting more at work.   -SC         Subjective Pain    Able to rate subjective pain?  yes  -SC      Pre-Treatment Pain Level  0  -SC         Exercise 1    Exercise Name 1  tendon glides R L, e, a  -SC      Sets 1  1  -SC      Reps 1  10  -SC      Additional Comments   3x/day  -SC         Exercise 2    Exercise Name 2  elevation position handouts  -SC      Additional Comments  PRN  -SC         Exercise 3    Exercise Name 3  stress ball  -SC      Additional Comments  PRN in elevated position  -SC        User Key  (r) = Recorded By, (t) = Taken By, (c) = Cosigned By    Initials Name Provider Type    Sonja Delgado, PT Physical Therapist                      PT OP Goals     Row Name 11/10/20 0747          PT Short Term Goals    STG 1  Patient independent and compliant with initial home exercise program focused on diaphragmatic breathing, range of motion, flexibility to decrease edema and improve lymphatic flow for decreased edema and decreased risk of infection.   -SC     STG 1 Progress  Met  -SC     STG 2  Patient independent and compliant with self-massage techniques with spouse/family member as needed for improved lymphatic drainage, decreased edema/symptoms, decreased risk of infection and improved transition to self-care of condition.   -SC     STG 2 Progress  Met  -SC     STG 3  Patient independent and compliant with self-wrapping techniques of compression bandages with spouse/family member as indicated for improved self-management of condition.   -SC     STG 3 Progress  Met  -SC        Long Term Goals    LTG Date to Achieve  12/24/20  -SC     LTG 1  Patient demonstrate decreased net edema of right upper extremity >/= 2-4 cm for decrease in edema, symptoms, decreased risk of infection and improved skin care/transition to self-care of condition.   -SC     LTG 1 Progress  Progressing  -SC     LTG 1 Progress Comments  negative visible edema today in hand, very mild at PIP joints of digits 2-4, no measureable difference  -SC     LTG 2  Patient independent and compliant with home pneumatic compression pump for transition to self-care of condition.  -SC     LTG 2 Progress  Met  -SC     LTG 3  Patient independent and compliant with daytime/night time OTS vs custom compression  garments as indicated for decreased risk of lymphedema and infection and safety with transition of self-care of condition.   -SC     LTG 3 Progress  Met  [facility]  Patient independent and compliant with advanced Home Exercise Program for self-management of condition.   [facility]  Progressing  -[facility] 4 Progress Comments  updated today  [facility]  Patient transition to cardiovascular exercise program (walking) >/=150 to 180 mins/week with moderate intensity for improved heart health, weight loss, decreased risk of osteoporosis and improved phase angle/metabolic rate.  -[facility] 5 Progress  Ongoing  -SC        Time Calculation    PT Goal Re-Cert Due Date  12/24/20  -SC       User Key  (r) = Recorded By, (t) = Taken By, (c) = Cosigned By    Initials Name Provider Type    SC Sonja Griffin, PT Physical Therapist          Therapy Education  Education Details: elevation, self massage during day, tendon glides  Given: HEP, Symptoms/condition management, Pain management, Mobility training, Edema management, Other (comment)  Program: Progressed, Modified  How Provided: Verbal, Demonstration, Written  Provided to: Patient  Level of Understanding: Teach back education performed, Verbalized, Demonstrated              Time Calculation:   Start Time: 0747  Stop Time: 0821  Time Calculation (min): 34 min   Therapy Charges for Today     Code Description Service Date Service Provider Modifiers Qty    16736390830 HC PT THER PROC EA 15 MIN 11/10/2020 Sonja Griffin, PT GP 1    [id number]  PT MANUAL THERAPY EA 15 MIN 11/10/2020 Sonja Griffin, PT GP 1                    Sonja William PT  11/10/2020

## 2020-11-13 ENCOUNTER — HOSPITAL ENCOUNTER (OUTPATIENT)
Dept: ULTRASOUND IMAGING | Facility: HOSPITAL | Age: 40
Discharge: HOME | End: 2020-11-13
Admitting: INTERNAL MEDICINE

## 2020-11-13 DIAGNOSIS — Z17.0 MALIGNANT NEOPLASM OF CENTRAL PORTION OF BOTH BREASTS IN FEMALE, ESTROGEN RECEPTOR POSITIVE (HCC): ICD-10-CM

## 2020-11-13 DIAGNOSIS — C50.112 MALIGNANT NEOPLASM OF CENTRAL PORTION OF BOTH BREASTS IN FEMALE, ESTROGEN RECEPTOR POSITIVE (HCC): ICD-10-CM

## 2020-11-13 DIAGNOSIS — C50.111 MALIGNANT NEOPLASM OF CENTRAL PORTION OF BOTH BREASTS IN FEMALE, ESTROGEN RECEPTOR POSITIVE (HCC): ICD-10-CM

## 2020-11-13 PROCEDURE — 76705 ECHO EXAM OF ABDOMEN: CPT

## 2020-11-20 ENCOUNTER — APPOINTMENT (OUTPATIENT)
Dept: MRI IMAGING | Facility: HOSPITAL | Age: 40
End: 2020-11-20

## 2020-12-01 ENCOUNTER — HOSPITAL ENCOUNTER (OUTPATIENT)
Dept: PHYSICAL THERAPY | Facility: HOSPITAL | Age: 40
Setting detail: THERAPIES SERIES
Discharge: HOME | End: 2020-12-01

## 2020-12-01 DIAGNOSIS — Z15.09 BRCA1 GENETIC CARRIER: ICD-10-CM

## 2020-12-01 DIAGNOSIS — Z15.01 BRCA1 GENETIC CARRIER: ICD-10-CM

## 2020-12-01 DIAGNOSIS — Z42.1 AFTERCARE POSTMASTECTOMY FOR BREAST RECONSTRUCTION: ICD-10-CM

## 2020-12-01 DIAGNOSIS — I97.2 POSTMASTECTOMY LYMPHEDEMA SYNDROME: Primary | ICD-10-CM

## 2020-12-01 DIAGNOSIS — M79.641 RIGHT HAND PAIN: ICD-10-CM

## 2020-12-01 DIAGNOSIS — C50.111 MALIGNANT NEOPLASM OF CENTRAL PORTION OF BOTH BREASTS IN FEMALE, ESTROGEN RECEPTOR POSITIVE (HCC): ICD-10-CM

## 2020-12-01 DIAGNOSIS — M85.869 OSTEOPENIA OF LOWER LEG, UNSPECIFIED LATERALITY: ICD-10-CM

## 2020-12-01 DIAGNOSIS — C50.112 MALIGNANT NEOPLASM OF CENTRAL PORTION OF BOTH BREASTS IN FEMALE, ESTROGEN RECEPTOR POSITIVE (HCC): ICD-10-CM

## 2020-12-01 DIAGNOSIS — Z90.13 S/P BILATERAL MASTECTOMY: ICD-10-CM

## 2020-12-01 DIAGNOSIS — R60.0 EDEMA OF HAND: ICD-10-CM

## 2020-12-01 DIAGNOSIS — Z17.0 MALIGNANT NEOPLASM OF CENTRAL PORTION OF BOTH BREASTS IN FEMALE, ESTROGEN RECEPTOR POSITIVE (HCC): ICD-10-CM

## 2020-12-01 PROCEDURE — 97110 THERAPEUTIC EXERCISES: CPT | Performed by: PHYSICAL THERAPIST

## 2020-12-07 ENCOUNTER — OFFICE VISIT (OUTPATIENT)
Dept: GASTROENTEROLOGY | Facility: CLINIC | Age: 40
End: 2020-12-07

## 2020-12-07 VITALS — WEIGHT: 132.4 LBS | HEIGHT: 60 IN | BODY MASS INDEX: 26 KG/M2

## 2020-12-07 DIAGNOSIS — K76.0 FATTY LIVER: ICD-10-CM

## 2020-12-07 DIAGNOSIS — K52.832 LYMPHOCYTIC COLITIS: Primary | ICD-10-CM

## 2020-12-07 DIAGNOSIS — R19.7 DIARRHEA OF PRESUMED INFECTIOUS ORIGIN: ICD-10-CM

## 2020-12-07 PROCEDURE — 99214 OFFICE O/P EST MOD 30 MIN: CPT | Performed by: NURSE PRACTITIONER

## 2020-12-07 NOTE — PROGRESS NOTES
Chief Complaint   Patient presents with   • Diarrhea improved       Sravani Sky is a  40 y.o. female here for a follow up visit for diarrhea.     HPI  41 yo f presents today for follow up visit for diarrhea. She is a patient of Dr. Sales. She was last seen by me in the office on 10/22/20. She has hx microcytic colitis. She admits she finished the pepto-bismol x 4 weeks. She admits the diarrhea has completely resolved. Her bowels are moving well now. She denies any dysphagia, reflux, abd pain, N&V, constipation, rectal bleeding or melena. She admits her appetite is good and her weight is stable. Her last colonoscopy was done . She does have hx fatty liver. She did US abd recently and it showed fatty liver. Otherwise normal.Most recent LFTs were better with only minimal elevated AST.     Past Medical History:   Diagnosis Date   • Cancer (CMS/HCC)     Bilateral breast cancer; diagnosed 2013   • Diarrhea     x3 weeks   • Heart burn    • History of radiation therapy    • Lymphedema     Right hand and wrist   • Slow to wake up after anesthesia     mother       Past Surgical History:   Procedure Laterality Date   • BREAST AUGMENTATION Right 2019    Procedure: RIGHT CAPSULOTOMY;  Surgeon: MADELYN Arreola MD;  Location: [facility];  Service: Plastics   • BREAST IMPLANT SURGERY Left    • BREAST SURGERY Bilateral 2014    Mastectomy   • BREAST TISSUE EXPANDER REMOVAL INSERTION OF IMPLANT     •  SECTION  , 2010    X2   • COLONOSCOPY N/A 2019    Hypertrophied anal papilla, NBIH   • FAT GRAFTING Right 2019    Procedure: RIGHT FAT GRAFTING WITH REVOLVE;  Surgeon: MADELYN Arreola MD;  Location: [facility];  Service: Plastics   • HYSTERECTOMY  2015    Total       Scheduled Meds:    Continuous Infusions:No current facility-administered medications for this visit.       PRN Meds:.    Allergies   Allergen Reactions   • No Known Drug Allergy        Social History     Socioeconomic History    • Marital status:      Spouse name: En   • Number of children: 2   • Years of education: College   • Highest education level: Not on file   Occupational History   • Occupation: Psychologist     Employer: [employer]   Tobacco Use   • Smoking status: Never Smoker   • Smokeless tobacco: Never Used   Substance and Sexual Activity   • Alcohol use: No   • Drug use: No   • Sexual activity: Defer     Partners: Male     Comment:        Family History   Problem Relation Age of Onset   • No Known Problems Mother    • No Known Problems Father    • Prostate cancer Maternal Uncle 54   • Breast cancer Paternal Aunt 40   • Breast cancer Maternal Grandmother 58   • Breast cancer Maternal Aunt         In her 50s   • Breast cancer Maternal Aunt         In her 50s   • Heart disease Other    • Hypertension Other    • Malig Hyperthermia Neg Hx        Review of Systems   Constitutional: Negative for appetite change, chills, diaphoresis, fatigue, fever and unexpected weight change.   HENT: Negative for nosebleeds, postnasal drip, sore throat, trouble swallowing and voice change.    Respiratory: Negative for cough, choking, chest tightness, shortness of breath and wheezing.    Cardiovascular: Negative for chest pain, palpitations and leg swelling.   Gastrointestinal: Negative for abdominal distention, abdominal pain, anal bleeding, blood in stool, constipation, diarrhea, nausea, rectal pain and vomiting.   Endocrine: Negative for polydipsia, polyphagia and polyuria.   Musculoskeletal: Negative for gait problem.   Skin: Negative for rash and wound.   Allergic/Immunologic: Negative for food allergies.   Neurological: Negative for dizziness, speech difficulty and light-headedness.   Psychiatric/Behavioral: Negative for confusion, self-injury, sleep disturbance and suicidal ideas.       There were no vitals filed for this visit.    Physical Exam  Constitutional:       General: She is not in acute  distress.     Appearance: She is well-developed. She is not ill-appearing.   HENT:      Head: Normocephalic.   Eyes:      Pupils: Pupils are equal, round, and reactive to light.   Cardiovascular:      Rate and Rhythm: Normal rate and regular rhythm.      Heart sounds: Normal heart sounds.   Pulmonary:      Effort: Pulmonary effort is normal.      Breath sounds: Normal breath sounds.   Abdominal:      General: Bowel sounds are normal. There is no distension.      Palpations: Abdomen is soft. There is no mass.      Tenderness: There is no abdominal tenderness. There is no guarding or rebound.      Hernia: No hernia is present.   Musculoskeletal: Normal range of motion.   Skin:     General: Skin is warm and dry.   Neurological:      Mental Status: She is alert and oriented to person, place, and time.   Psychiatric:         Speech: Speech normal.         Behavior: Behavior normal.         Judgment: Judgment normal.         No radiology results for the last 7 days     Assessment and plan     1. Lymphocytic colitis    2. Diarrhea of presumed infectious origin    3. Fatty liver    Reviewed most recent blood work with her today.  LFTs are improving.  Now she is only got a slightly elevated AST but it significantly better.  We had a long discussion today over fatty liver disease.  I reviewed an abdominal ultrasound which showed fatty liver disease.  Diarrhea has completely resolved since finishing the Pepto-Bismol x1 month.  Bowels are moving well.  Patient overall seems to be doing really well.  Patient to call the office with any issues.  Patient to follow-up with me in 6 months.

## 2020-12-14 ENCOUNTER — APPOINTMENT (OUTPATIENT)
Dept: PHYSICAL THERAPY | Facility: HOSPITAL | Age: 40
End: 2020-12-14

## 2020-12-14 RX ORDER — TAMOXIFEN CITRATE 20 MG/1
TABLET ORAL
Qty: 90 TABLET | Refills: 2 | Status: SHIPPED | OUTPATIENT
Start: 2020-12-14 | End: 2021-05-14

## 2021-01-26 ENCOUNTER — TELEPHONE (OUTPATIENT)
Dept: ONCOLOGY | Facility: CLINIC | Age: 41
End: 2021-01-26

## 2021-01-26 ENCOUNTER — TELEPHONE (OUTPATIENT)
Dept: ONCOLOGY | Facility: HOSPITAL | Age: 41
End: 2021-01-26

## 2021-02-03 RX ORDER — VENLAFAXINE HYDROCHLORIDE 37.5 MG/1
CAPSULE, EXTENDED RELEASE ORAL
Qty: 30 CAPSULE | Refills: 3 | Status: SHIPPED | OUTPATIENT
Start: 2021-02-03 | End: 2021-05-10

## 2021-04-02 ENCOUNTER — BULK ORDERING (OUTPATIENT)
Dept: CASE MANAGEMENT | Facility: OTHER | Age: 41
End: 2021-04-02

## 2021-04-02 DIAGNOSIS — Z23 IMMUNIZATION DUE: ICD-10-CM

## 2021-05-10 ENCOUNTER — LAB (OUTPATIENT)
Dept: OTHER | Facility: HOSPITAL | Age: 41
End: 2021-05-10

## 2021-05-10 ENCOUNTER — OFFICE VISIT (OUTPATIENT)
Dept: ONCOLOGY | Facility: CLINIC | Age: 41
End: 2021-05-10

## 2021-05-10 VITALS
HEART RATE: 97 BPM | HEIGHT: 60 IN | DIASTOLIC BLOOD PRESSURE: 77 MMHG | BODY MASS INDEX: 26.23 KG/M2 | OXYGEN SATURATION: 97 % | SYSTOLIC BLOOD PRESSURE: 116 MMHG | WEIGHT: 133.6 LBS | RESPIRATION RATE: 16 BRPM | TEMPERATURE: 97.3 F

## 2021-05-10 DIAGNOSIS — C50.111 MALIGNANT NEOPLASM OF CENTRAL PORTION OF BOTH BREASTS IN FEMALE, ESTROGEN RECEPTOR POSITIVE (HCC): ICD-10-CM

## 2021-05-10 DIAGNOSIS — C50.011 MALIGNANT NEOPLASM INVOLVING BOTH NIPPLE AND AREOLA OF RIGHT BREAST IN FEMALE, UNSPECIFIED ESTROGEN RECEPTOR STATUS (HCC): ICD-10-CM

## 2021-05-10 DIAGNOSIS — C50.112 MALIGNANT NEOPLASM OF CENTRAL PORTION OF BOTH BREASTS IN FEMALE, ESTROGEN RECEPTOR POSITIVE (HCC): ICD-10-CM

## 2021-05-10 DIAGNOSIS — Z15.09 BRCA1 GENETIC CARRIER: ICD-10-CM

## 2021-05-10 DIAGNOSIS — Z79.811 LONG TERM CURRENT USE OF AROMATASE INHIBITOR: Primary | ICD-10-CM

## 2021-05-10 DIAGNOSIS — Z15.01 BRCA1 GENETIC CARRIER: ICD-10-CM

## 2021-05-10 DIAGNOSIS — Z17.0 MALIGNANT NEOPLASM OF CENTRAL PORTION OF BOTH BREASTS IN FEMALE, ESTROGEN RECEPTOR POSITIVE (HCC): ICD-10-CM

## 2021-05-10 PROBLEM — T45.4X5A IRON ADVERSE REACTION: Status: ACTIVE | Noted: 2021-05-10

## 2021-05-10 LAB
ALBUMIN SERPL-MCNC: 3.5 G/DL (ref 3.5–5.2)
ALBUMIN/GLOB SERPL: 1.4 G/DL
ALP SERPL-CCNC: 62 U/L (ref 39–117)
ALT SERPL W P-5'-P-CCNC: 32 U/L (ref 1–33)
ANION GAP SERPL CALCULATED.3IONS-SCNC: 5.7 MMOL/L (ref 5–15)
AST SERPL-CCNC: 50 U/L (ref 1–32)
BASOPHILS # BLD AUTO: 0.03 10*3/MM3 (ref 0–0.2)
BASOPHILS NFR BLD AUTO: 0.5 % (ref 0–1.5)
BILIRUB SERPL-MCNC: <0.2 MG/DL (ref 0–1.2)
BUN SERPL-MCNC: 11 MG/DL (ref 6–20)
BUN/CREAT SERPL: 23.4 (ref 7–25)
CALCIUM SPEC-SCNC: 7.7 MG/DL (ref 8.6–10.5)
CHLORIDE SERPL-SCNC: 108 MMOL/L (ref 98–107)
CO2 SERPL-SCNC: 27.3 MMOL/L (ref 22–29)
CREAT SERPL-MCNC: 0.47 MG/DL (ref 0.57–1)
DEPRECATED RDW RBC AUTO: 47.9 FL (ref 37–54)
EOSINOPHIL # BLD AUTO: 0.07 10*3/MM3 (ref 0–0.4)
EOSINOPHIL NFR BLD AUTO: 1.1 % (ref 0.3–6.2)
ERYTHROCYTE [DISTWIDTH] IN BLOOD BY AUTOMATED COUNT: 14.8 % (ref 12.3–15.4)
FERRITIN SERPL-MCNC: 8.5 NG/ML (ref 13–150)
GFR SERPL CREATININE-BSD FRML MDRD: 146 ML/MIN/1.73
GLOBULIN UR ELPH-MCNC: 2.5 GM/DL
GLUCOSE SERPL-MCNC: 99 MG/DL (ref 65–99)
HCT VFR BLD AUTO: 37.6 % (ref 34–46.6)
HGB BLD-MCNC: 11.8 G/DL (ref 12–15.9)
IMM GRANULOCYTES # BLD AUTO: 0.03 10*3/MM3 (ref 0–0.05)
IMM GRANULOCYTES NFR BLD AUTO: 0.5 % (ref 0–0.5)
LYMPHOCYTES # BLD AUTO: 1.36 10*3/MM3 (ref 0.7–3.1)
LYMPHOCYTES NFR BLD AUTO: 21.6 % (ref 19.6–45.3)
MCH RBC QN AUTO: 27.7 PG (ref 26.6–33)
MCHC RBC AUTO-ENTMCNC: 31.4 G/DL (ref 31.5–35.7)
MCV RBC AUTO: 88.3 FL (ref 79–97)
MONOCYTES # BLD AUTO: 0.68 10*3/MM3 (ref 0.1–0.9)
MONOCYTES NFR BLD AUTO: 10.8 % (ref 5–12)
NEUTROPHILS NFR BLD AUTO: 4.13 10*3/MM3 (ref 1.7–7)
NEUTROPHILS NFR BLD AUTO: 65.5 % (ref 42.7–76)
NRBC BLD AUTO-RTO: 0 /100 WBC (ref 0–0.2)
PLATELET # BLD AUTO: 355 10*3/MM3 (ref 140–450)
PMV BLD AUTO: 9 FL (ref 6–12)
POTASSIUM SERPL-SCNC: 4.1 MMOL/L (ref 3.5–5.2)
PROT SERPL-MCNC: 6 G/DL (ref 6–8.5)
RBC # BLD AUTO: 4.26 10*6/MM3 (ref 3.77–5.28)
SODIUM SERPL-SCNC: 141 MMOL/L (ref 136–145)
WBC # BLD AUTO: 6.3 10*3/MM3 (ref 3.4–10.8)

## 2021-05-10 PROCEDURE — 82728 ASSAY OF FERRITIN: CPT | Performed by: INTERNAL MEDICINE

## 2021-05-10 PROCEDURE — 36415 COLL VENOUS BLD VENIPUNCTURE: CPT

## 2021-05-10 PROCEDURE — 85025 COMPLETE CBC W/AUTO DIFF WBC: CPT | Performed by: INTERNAL MEDICINE

## 2021-05-10 PROCEDURE — 99214 OFFICE O/P EST MOD 30 MIN: CPT | Performed by: INTERNAL MEDICINE

## 2021-05-10 PROCEDURE — 80053 COMPREHEN METABOLIC PANEL: CPT | Performed by: INTERNAL MEDICINE

## 2021-05-10 RX ORDER — PROCHLORPERAZINE MALEATE 10 MG
10 TABLET ORAL ONCE
Status: CANCELLED | OUTPATIENT
Start: 2021-05-24 | End: 2021-05-24

## 2021-05-10 RX ORDER — VENLAFAXINE 25 MG/1
25 TABLET ORAL 2 TIMES DAILY
Qty: 30 TABLET | Refills: 0 | Status: SHIPPED | OUTPATIENT
Start: 2021-05-10 | End: 2021-06-04 | Stop reason: SDUPTHER

## 2021-05-10 RX ORDER — PROCHLORPERAZINE MALEATE 10 MG
10 TABLET ORAL ONCE
Status: CANCELLED | OUTPATIENT
Start: 2021-05-17 | End: 2021-05-17

## 2021-05-10 RX ORDER — SODIUM CHLORIDE 9 MG/ML
250 INJECTION, SOLUTION INTRAVENOUS ONCE
Status: CANCELLED | OUTPATIENT
Start: 2021-05-24

## 2021-05-10 RX ORDER — SODIUM CHLORIDE 9 MG/ML
250 INJECTION, SOLUTION INTRAVENOUS ONCE
Status: CANCELLED | OUTPATIENT
Start: 2021-05-17

## 2021-05-14 ENCOUNTER — APPOINTMENT (OUTPATIENT)
Dept: ONCOLOGY | Facility: HOSPITAL | Age: 41
End: 2021-05-14

## 2021-05-14 ENCOUNTER — OFFICE VISIT (OUTPATIENT)
Dept: GASTROENTEROLOGY | Facility: CLINIC | Age: 41
End: 2021-05-14

## 2021-05-14 VITALS — HEIGHT: 60 IN | BODY MASS INDEX: 25.95 KG/M2 | WEIGHT: 132.2 LBS | TEMPERATURE: 98.1 F

## 2021-05-14 DIAGNOSIS — K52.839 MICROSCOPIC COLITIS, UNSPECIFIED MICROSCOPIC COLITIS TYPE: ICD-10-CM

## 2021-05-14 DIAGNOSIS — K76.0 FATTY LIVER: ICD-10-CM

## 2021-05-14 DIAGNOSIS — R79.0 LOW FERRITIN LEVEL: ICD-10-CM

## 2021-05-14 DIAGNOSIS — R19.7 DIARRHEA, UNSPECIFIED TYPE: Primary | ICD-10-CM

## 2021-05-14 DIAGNOSIS — Z85.3 HISTORY OF BREAST CANCER: ICD-10-CM

## 2021-05-14 PROCEDURE — 99214 OFFICE O/P EST MOD 30 MIN: CPT | Performed by: NURSE PRACTITIONER

## 2021-05-14 NOTE — PROGRESS NOTES
Chief Complaint   Patient presents with   • Follow-up   • Diarrhea       Sravani Sky is a  41 y.o. female here for a follow up visit for diarrhea.    HPI  41-year-old female presents today for follow-up visit for diarrhea.  She is a patient of Dr. Sales.  She was last seen in the office by me on 12/7/2020.  She has a history of diarrhea secondary to microscopic colitis.  She normally has several flareups of her colitis several times a year which requires her to take Pepto-Bismol chewables for about a month at a time and that seems to relieve the diarrhea.  She reports lately though that that she is learning more about the cycles of diarrhea and she says they start with constipation first.  She tells me she does not feel like her bowels are ever normal.  She tells me since her breast cancer she does feel like her gut has been so messed up.  Since she did have the right radiation and chemotherapy.  She tells me her gut just does not feel the same.  She has not had a normal bowel movement in years.  She will have a day or 2 of constipation she will also have worsening reflux at the same time and then she will have a lot of diarrhea which then she takes the Pepto-Bismol for.  She does not take anything routinely to stay regular no MiraLAX no fiber no probiotics.  She also has a history of fatty liver disease and her most recent labs show a slightly elevated AST at 50.  All other LFTs were normal.  She is happy to report that her PCP is taking her off the tamoxifen since she has been on it now for 7 years and plans to slowly wean the Effexor.  She is hoping these medications may have been contributing to her bowel issues so coming off of them may be helpful.  She did have her most recent blood work done just 4 days ago and she has a lower ferritin and a slightly lower hemoglobin.  She said this is a new finding for her.  She is never had an EGD before.  Her last colonoscopy was on 11/2019.  Not only does she have a  history of breast cancer diagnosed in  but she also has the BRCA1 gene mutation.  She tells me the reflux has been worse lately and she is been taking a lot of Tums.  She denies any dysphagia, abdominal pain, nausea, vomiting, rectal bleeding or melena.  She notes her appetite is okay and her weight is stable.  Past Medical History:   Diagnosis Date   • Cancer (CMS/HCC)     Bilateral breast cancer; diagnosed 2013   • Diarrhea     x3 weeks   • Heart burn    • History of radiation therapy    • Lymphedema     Right hand and wrist   • Slow to wake up after anesthesia     mother       Past Surgical History:   Procedure Laterality Date   • BREAST AUGMENTATION Right 2019    Procedure: RIGHT CAPSULOTOMY;  Surgeon: MADELYN Arreola MD;  Location: [facility];  Service: Plastics   • BREAST IMPLANT SURGERY Left    • BREAST SURGERY Bilateral 2014    Mastectomy   • BREAST TISSUE EXPANDER REMOVAL INSERTION OF IMPLANT     •  SECTION  , 2010    X2   • COLONOSCOPY N/A 2019    Hypertrophied anal papilla, NBIH   • FAT GRAFTING Right 2019    Procedure: RIGHT FAT GRAFTING WITH REVOLVE;  Surgeon: MADELYN Arreola MD;  Location: [facility];  Service: Plastics   • HYSTERECTOMY      Total       Scheduled Meds:    Continuous Infusions:No current facility-administered medications for this visit.      PRN Meds:.    Allergies   Allergen Reactions   • No Known Drug Allergy        Social History     Socioeconomic History   • Marital status:      Spouse name: En   • Number of children: 2   • Years of education: College   • Highest education level: Not on file   Tobacco Use   • Smoking status: Never Smoker   • Smokeless tobacco: Never Used   Substance and Sexual Activity   • Alcohol use: No   • Drug use: No   • Sexual activity: Defer     Partners: Male     Comment:        Family History   Problem Relation Age of Onset   • No Known Problems Mother    • No Known Problems Father    •  Prostate cancer Maternal Uncle 54   • Breast cancer Paternal Aunt 40   • Breast cancer Maternal Grandmother 58   • Breast cancer Maternal Aunt         In her 50s   • Breast cancer Maternal Aunt         In her 50s   • Heart disease Other    • Hypertension Other    • Malig Hyperthermia Neg Hx        Review of Systems   Constitutional: Negative for appetite change, chills, diaphoresis, fatigue, fever and unexpected weight change.   HENT: Negative for nosebleeds, postnasal drip, sore throat, trouble swallowing and voice change.    Respiratory: Negative for cough, choking, chest tightness, shortness of breath and wheezing.    Cardiovascular: Negative for chest pain, palpitations and leg swelling.   Gastrointestinal: Positive for constipation and diarrhea. Negative for abdominal distention, abdominal pain, anal bleeding, blood in stool, nausea, rectal pain and vomiting.   Endocrine: Negative for polydipsia, polyphagia and polyuria.   Musculoskeletal: Negative for gait problem.   Skin: Negative for rash and wound.   Allergic/Immunologic: Negative for food allergies.   Neurological: Negative for dizziness, speech difficulty and light-headedness.   Psychiatric/Behavioral: Negative for confusion, self-injury, sleep disturbance and suicidal ideas.       Vitals:    05/14/21 0927   Temp: 98.1 °F (36.7 °C)       Physical Exam  Constitutional:       General: She is not in acute distress.     Appearance: She is well-developed. She is not ill-appearing.   HENT:      Head: Normocephalic.   Eyes:      Pupils: Pupils are equal, round, and reactive to light.   Cardiovascular:      Rate and Rhythm: Normal rate and regular rhythm.      Heart sounds: Normal heart sounds.   Pulmonary:      Effort: Pulmonary effort is normal.      Breath sounds: Normal breath sounds.   Abdominal:      General: Bowel sounds are normal. There is no distension.      Palpations: Abdomen is soft. There is no mass.      Tenderness: There is no abdominal  tenderness. There is no guarding or rebound.      Hernia: No hernia is present.   Musculoskeletal:         General: Normal range of motion.   Skin:     General: Skin is warm and dry.   Neurological:      Mental Status: She is alert and oriented to person, place, and time.   Psychiatric:         Speech: Speech normal.         Behavior: Behavior normal.         Judgment: Judgment normal.         No radiology results for the last 7 days     Assessment and plan     1. Diarrhea, unspecified type    2. Microscopic colitis, unspecified microscopic colitis type    3. Fatty liver    4. Low ferritin level    5. History of breast cancer    Diarrhea seems to be back but she does have this cycle where she is first constipated and then the diarrhea comes.  So I am wondering if it is not really due to microscopic colitis and is more of an IBS situation?  For now I want her to continue the Pepto-Bismol chewables since it does seem to stop the diarrhea.  However once the diarrhea stops and she feels like her stools are back to being formed I would like her to trial some daily MiraLAX to see if we can prevent to the constipation which then prevents the diarrhea.  Because it sounds like even when her diarrhea is not occurring she is still irregular in her bowel movements.  I want her to increase her water and exercise as tolerated.  Most recent liver enzymes still show a slightly elevated AST at 50.  All other liver enzymes are normal.  She is now having a lower ferritin level and her hemoglobin is slightly on the lower side.  I will discuss the case further with Dr. Michael and see if she wants to do scopes at this time?  GERD is not well controlled either.  When she is having issues with constipation and diarrhea she seems to have reflux symptoms.  I want her to start Pepcid AC complete over-the-counter and see if that helps.  Continue GERD precautions.  Patient to go ahead make follow-up with Dr. Sales as planned.  Patient is  agreeable to the plan.

## 2021-05-17 ENCOUNTER — INFUSION (OUTPATIENT)
Dept: ONCOLOGY | Facility: HOSPITAL | Age: 41
End: 2021-05-17

## 2021-05-17 VITALS
RESPIRATION RATE: 18 BRPM | DIASTOLIC BLOOD PRESSURE: 61 MMHG | BODY MASS INDEX: 26.03 KG/M2 | TEMPERATURE: 97.3 F | WEIGHT: 132.6 LBS | OXYGEN SATURATION: 100 % | HEIGHT: 60 IN | SYSTOLIC BLOOD PRESSURE: 95 MMHG | HEART RATE: 83 BPM

## 2021-05-17 DIAGNOSIS — T45.4X5A ADVERSE EFFECT OF IRON, INITIAL ENCOUNTER: Primary | ICD-10-CM

## 2021-05-17 PROCEDURE — 96366 THER/PROPH/DIAG IV INF ADDON: CPT

## 2021-05-17 PROCEDURE — 96365 THER/PROPH/DIAG IV INF INIT: CPT

## 2021-05-17 PROCEDURE — 25010000002 IRON SUCROSE PER 1 MG: Performed by: NURSE PRACTITIONER

## 2021-05-17 PROCEDURE — 63710000001 DIPHENHYDRAMINE PER 50 MG: Performed by: NURSE PRACTITIONER

## 2021-05-17 RX ORDER — DIPHENHYDRAMINE HCL 25 MG
25 CAPSULE ORAL ONCE
Status: COMPLETED | OUTPATIENT
Start: 2021-05-17 | End: 2021-05-17

## 2021-05-17 RX ORDER — SODIUM CHLORIDE 9 MG/ML
250 INJECTION, SOLUTION INTRAVENOUS ONCE
Status: COMPLETED | OUTPATIENT
Start: 2021-05-17 | End: 2021-05-17

## 2021-05-17 RX ORDER — ACETAMINOPHEN 325 MG/1
650 TABLET ORAL ONCE
Status: COMPLETED | OUTPATIENT
Start: 2021-05-17 | End: 2021-05-17

## 2021-05-17 RX ADMIN — SODIUM CHLORIDE 250 ML: 9 INJECTION, SOLUTION INTRAVENOUS at 08:51

## 2021-05-17 RX ADMIN — SODIUM CHLORIDE 300 MG: 900 INJECTION, SOLUTION INTRAVENOUS at 09:22

## 2021-05-17 RX ADMIN — ACETAMINOPHEN 650 MG: 325 TABLET, FILM COATED ORAL at 08:50

## 2021-05-17 RX ADMIN — DIPHENHYDRAMINE HYDROCHLORIDE 25 MG: 25 CAPSULE ORAL at 08:50

## 2021-05-19 ENCOUNTER — TELEPHONE (OUTPATIENT)
Dept: GASTROENTEROLOGY | Facility: CLINIC | Age: 41
End: 2021-05-19

## 2021-05-19 DIAGNOSIS — R79.0 LOW FERRITIN LEVEL: ICD-10-CM

## 2021-05-19 DIAGNOSIS — R19.7 DIARRHEA, UNSPECIFIED TYPE: Primary | ICD-10-CM

## 2021-05-19 NOTE — TELEPHONE ENCOUNTER
Call to pt.  Advise per YOEL Mills and Dr Sales' notes.  Verb understanding.     States had first iron infusion with CBC on 5/17 and will have 2nd infusion 5/24.      Agreeable to EGD and c/s - advise Scheduling will contact to arrange.     Case request placed - message to Dr Sales.

## 2021-05-19 NOTE — TELEPHONE ENCOUNTER
----- Message from JEFFRY Armijo sent at 5/14/2021  3:48 PM EDT -----  Please call the patient with recommendations below from Dr. Sales.  If she is agreeable I can order either the EGD and colonoscopy or just the EGD.  And please make sure she is already on iron supplementation.  If she is not already on it she can take the ferrous sulfate 325 mg daily to start.  Make sure she takes the iron with vitamin C because it helps with better absorption.  ----- Message -----  From: Sonja Sales MD  Sent: 5/14/2021   3:44 PM EDT  To: JEFFRY Armijo    I definitely think an EGD is warranted.  If she is willing for the colonoscopy, we can do that as well but I got a really good look last time (less than 2 years ago) and it was pretty bland other than bx showing microscopic colitis.If she is not already on iron supplementation, needs to be on this.    Thx  ----- Message -----  From: Adrianna Mills APRN  Sent: 5/14/2021  10:16 AM EDT  To: Sonja Sales MD    She has a history of breast cancer with the BRCA1 mutation gene.  Last colonoscopy was on 11/2019.  Has history of microscopic colitis.  She does had her labs done and she has a lower ferritin and her hemoglobin slightly lower to.  Her PCP was wondering if we should do scopes on her?  She is also now having worsening reflux which is new.  The patient was want to know if she could do a double?  What are your thoughts?  Thanks

## 2021-05-21 ENCOUNTER — APPOINTMENT (OUTPATIENT)
Dept: ONCOLOGY | Facility: HOSPITAL | Age: 41
End: 2021-05-21

## 2021-05-24 ENCOUNTER — INFUSION (OUTPATIENT)
Dept: ONCOLOGY | Facility: HOSPITAL | Age: 41
End: 2021-05-24

## 2021-05-24 VITALS
RESPIRATION RATE: 18 BRPM | HEART RATE: 77 BPM | TEMPERATURE: 97.1 F | DIASTOLIC BLOOD PRESSURE: 70 MMHG | BODY MASS INDEX: 25.68 KG/M2 | HEIGHT: 60 IN | SYSTOLIC BLOOD PRESSURE: 102 MMHG | OXYGEN SATURATION: 100 % | WEIGHT: 130.8 LBS

## 2021-05-24 DIAGNOSIS — T45.4X5A ADVERSE EFFECT OF IRON, INITIAL ENCOUNTER: Primary | ICD-10-CM

## 2021-05-24 PROCEDURE — 25010000002 IRON SUCROSE PER 1 MG: Performed by: INTERNAL MEDICINE

## 2021-05-24 PROCEDURE — 96366 THER/PROPH/DIAG IV INF ADDON: CPT

## 2021-05-24 PROCEDURE — 96365 THER/PROPH/DIAG IV INF INIT: CPT

## 2021-05-24 RX ORDER — FAMOTIDINE 20 MG/1
20 TABLET, FILM COATED ORAL ONCE
Status: COMPLETED | OUTPATIENT
Start: 2021-05-24 | End: 2021-05-24

## 2021-05-24 RX ORDER — SODIUM CHLORIDE 9 MG/ML
250 INJECTION, SOLUTION INTRAVENOUS ONCE
Status: COMPLETED | OUTPATIENT
Start: 2021-05-24 | End: 2021-05-24

## 2021-05-24 RX ORDER — ACETAMINOPHEN 325 MG/1
650 TABLET ORAL ONCE
Status: COMPLETED | OUTPATIENT
Start: 2021-05-24 | End: 2021-05-24

## 2021-05-24 RX ADMIN — SODIUM CHLORIDE 300 MG: 900 INJECTION, SOLUTION INTRAVENOUS at 09:44

## 2021-05-24 RX ADMIN — FAMOTIDINE 20 MG: 20 TABLET, FILM COATED ORAL at 09:10

## 2021-05-24 RX ADMIN — ACETAMINOPHEN 650 MG: 325 TABLET ORAL at 09:10

## 2021-05-24 RX ADMIN — SODIUM CHLORIDE 250 ML: 900 INJECTION, SOLUTION INTRAVENOUS at 09:05

## 2021-05-24 NOTE — NURSING NOTE
Pt requested to not be given Benadryl with her venofer infusion if possible. Spoke with Rona Ibrahim, clinical nurse for Dr. Moreno and per Dr. Josh todd to not give benadryl today but to administer 20mg Pepcid. Rona adjusted plan.

## 2021-06-04 ENCOUNTER — TELEPHONE (OUTPATIENT)
Dept: PHARMACY | Facility: HOSPITAL | Age: 41
End: 2021-06-04

## 2021-06-04 RX ORDER — VENLAFAXINE HYDROCHLORIDE 37.5 MG/1
CAPSULE, EXTENDED RELEASE ORAL
Qty: 10 CAPSULE | Refills: 0 | Status: SHIPPED | OUTPATIENT
Start: 2021-06-04 | End: 2021-11-10

## 2021-06-04 RX ORDER — VENLAFAXINE 25 MG/1
TABLET ORAL
Qty: 30 TABLET | Refills: 0 | Status: SHIPPED | OUTPATIENT
Start: 2021-06-04 | End: 2021-12-06

## 2021-06-04 NOTE — TELEPHONE ENCOUNTER
Pt still needs a refill on the Effexor XR bc needs to complete a month of being off the tamoxifen. Also, she never received the taper dose of Effexor. Will send that in again as well.

## 2021-06-04 NOTE — TELEPHONE ENCOUNTER
PATIENT CALLED TO GET STATUS ON MED REFILL. HER PHARMACY HAS NOT RECEIVED IT YET AND SHE HAS NO PILLS LEFT.  PLEASE SEND TO PHARMACY.

## 2021-06-23 ENCOUNTER — TELEPHONE (OUTPATIENT)
Dept: GASTROENTEROLOGY | Facility: CLINIC | Age: 41
End: 2021-06-23

## 2021-06-23 PROBLEM — R79.0 LOW FERRITIN LEVEL: Status: ACTIVE | Noted: 2021-06-23

## 2021-06-23 PROBLEM — R19.7 DIARRHEA: Status: ACTIVE | Noted: 2021-06-23

## 2021-06-23 NOTE — TELEPHONE ENCOUNTER
spoke with pt scheduled at [facility] on aug 18 arrive at 1000 am john ----david--  
family member

## 2021-06-30 ENCOUNTER — TELEPHONE (OUTPATIENT)
Dept: ONCOLOGY | Facility: CLINIC | Age: 41
End: 2021-06-30

## 2021-06-30 NOTE — TELEPHONE ENCOUNTER
Called and talked with pt re: effexor weening. Pt has been feeling poorly since stopping her medication on Friday. At recommendation of pharmacist and Dr. Moreno, pt should resume the most recent dose she was on, which she states was 12.5mg daily. She then states that she may actually be feeling a bit better this afternoon than she thought and would like to try taking 12.5 every other day. Will report back if she has any issues.

## 2021-07-30 ENCOUNTER — TELEPHONE (OUTPATIENT)
Dept: GASTROENTEROLOGY | Facility: CLINIC | Age: 41
End: 2021-07-30

## 2021-07-30 NOTE — TELEPHONE ENCOUNTER
----- Message from Den Escobar sent at 7/30/2021 11:01 AM EDT -----  Regarding: CALLBACK SYMPTOMS  Contact: [phone]  PT called to speak with nurses about symptoms she is having. Can someone reach out to PT please, thank you!

## 2021-07-30 NOTE — TELEPHONE ENCOUNTER
Sounds like her microscopic colitis might not be well controlled on the Pepto-Bismol.  We could do a Medrol Dosepak or prednisone taper?  In the meantime she can also add Imodium OTC as needed to the Pepto-Bismol.

## 2021-08-02 RX ORDER — METHYLPREDNISOLONE 4 MG/1
TABLET ORAL
Qty: 1 EACH | Refills: 0 | Status: SHIPPED | OUTPATIENT
Start: 2021-08-02 | End: 2021-11-10

## 2021-08-11 ENCOUNTER — TRANSCRIBE ORDERS (OUTPATIENT)
Dept: SLEEP MEDICINE | Facility: HOSPITAL | Age: 41
End: 2021-08-11

## 2021-08-11 DIAGNOSIS — Z01.818 OTHER SPECIFIED PRE-OPERATIVE EXAMINATION: Primary | ICD-10-CM

## 2021-08-13 ENCOUNTER — TELEPHONE (OUTPATIENT)
Dept: GASTROENTEROLOGY | Facility: CLINIC | Age: 41
End: 2021-08-13

## 2021-08-13 NOTE — TELEPHONE ENCOUNTER
Yes, she still needs to take the prep.  She can stop when she is stooling clear yellow the morning of the procedure.

## 2021-08-13 NOTE — TELEPHONE ENCOUNTER
Called pt and pt reports that she completed steroid dose master last weekend. Pt states she is still having symptoms.  Still taking pepto 2 to 3 times a day.  Pt reports she is still having diarrhea 3-4 times in the am and then 3 other times through out the day. She states she has diarrhea every time she goes to bathroom. She states she is scheduled for c/s on 08/18.     She is asking if she should still follow the miralax prep instructions since she is already have diarrhea.  ADvised will send message to Dr Sales.

## 2021-08-13 NOTE — TELEPHONE ENCOUNTER
----- Message from Den Shrestha sent at 8/13/2021  9:00 AM EDT -----  Regarding: Issues  Contact: [phone]  Pt stated having lots of issues and had questions regarding Colonoscopy Prep in relations to issues having.  Please contact pt thank you.

## 2021-08-16 NOTE — TELEPHONE ENCOUNTER
Called pt and advised of Dr Sales note.   Verb understanding. Pt states she needs a copy of the diet instructions for tomorrow.  Advised pt will send this thru her my chart.  Pt verb understanding.       Instructions sent to pt thru sandra

## 2021-08-17 ENCOUNTER — LAB (OUTPATIENT)
Dept: LAB | Facility: HOSPITAL | Age: 41
End: 2021-08-17

## 2021-08-17 DIAGNOSIS — Z01.818 OTHER SPECIFIED PRE-OPERATIVE EXAMINATION: ICD-10-CM

## 2021-08-17 LAB — SARS-COV-2 ORF1AB RESP QL NAA+PROBE: NOT DETECTED

## 2021-08-17 PROCEDURE — U0004 COV-19 TEST NON-CDC HGH THRU: HCPCS

## 2021-08-17 PROCEDURE — C9803 HOPD COVID-19 SPEC COLLECT: HCPCS

## 2021-08-18 ENCOUNTER — ANESTHESIA (OUTPATIENT)
Dept: GASTROENTEROLOGY | Facility: HOSPITAL | Age: 41
End: 2021-08-18

## 2021-08-18 ENCOUNTER — ANESTHESIA EVENT (OUTPATIENT)
Dept: GASTROENTEROLOGY | Facility: HOSPITAL | Age: 41
End: 2021-08-18

## 2021-08-18 ENCOUNTER — HOSPITAL ENCOUNTER (OUTPATIENT)
Facility: HOSPITAL | Age: 41
Setting detail: HOSPITAL OUTPATIENT SURGERY
Discharge: HOME | End: 2021-08-18
Attending: INTERNAL MEDICINE | Admitting: INTERNAL MEDICINE

## 2021-08-18 VITALS
WEIGHT: 123.7 LBS | DIASTOLIC BLOOD PRESSURE: 69 MMHG | SYSTOLIC BLOOD PRESSURE: 111 MMHG | RESPIRATION RATE: 16 BRPM | HEART RATE: 67 BPM | HEIGHT: 60 IN | BODY MASS INDEX: 24.29 KG/M2 | OXYGEN SATURATION: 100 %

## 2021-08-18 DIAGNOSIS — R79.0 LOW FERRITIN LEVEL: ICD-10-CM

## 2021-08-18 DIAGNOSIS — R19.7 DIARRHEA, UNSPECIFIED TYPE: ICD-10-CM

## 2021-08-18 LAB
BASOPHILS # BLD AUTO: 0.02 10*3/MM3 (ref 0–0.2)
BASOPHILS NFR BLD AUTO: 0.6 % (ref 0–1.5)
DEPRECATED RDW RBC AUTO: 54.6 FL (ref 37–54)
EOSINOPHIL # BLD AUTO: 0.04 10*3/MM3 (ref 0–0.4)
EOSINOPHIL NFR BLD AUTO: 1.1 % (ref 0.3–6.2)
ERYTHROCYTE [DISTWIDTH] IN BLOOD BY AUTOMATED COUNT: 15.4 % (ref 12.3–15.4)
FERRITIN SERPL-MCNC: 114 NG/ML (ref 13–150)
HCT VFR BLD AUTO: 42.3 % (ref 34–46.6)
HGB BLD-MCNC: 13.9 G/DL (ref 12–15.9)
IGA1 MFR SER: 250 MG/DL (ref 70–400)
IMM GRANULOCYTES # BLD AUTO: 0.01 10*3/MM3 (ref 0–0.05)
IMM GRANULOCYTES NFR BLD AUTO: 0.3 % (ref 0–0.5)
IRON 24H UR-MRATE: 52 MCG/DL (ref 37–145)
IRON SATN MFR SERPL: 19 % (ref 20–50)
LYMPHOCYTES # BLD AUTO: 0.76 10*3/MM3 (ref 0.7–3.1)
LYMPHOCYTES NFR BLD AUTO: 21.5 % (ref 19.6–45.3)
MCH RBC QN AUTO: 31.9 PG (ref 26.6–33)
MCHC RBC AUTO-ENTMCNC: 32.9 G/DL (ref 31.5–35.7)
MCV RBC AUTO: 97 FL (ref 79–97)
MONOCYTES # BLD AUTO: 0.45 10*3/MM3 (ref 0.1–0.9)
MONOCYTES NFR BLD AUTO: 12.7 % (ref 5–12)
NEUTROPHILS NFR BLD AUTO: 2.25 10*3/MM3 (ref 1.7–7)
NEUTROPHILS NFR BLD AUTO: 63.8 % (ref 42.7–76)
NRBC BLD AUTO-RTO: 0 /100 WBC (ref 0–0.2)
PLATELET # BLD AUTO: 254 10*3/MM3 (ref 140–450)
PMV BLD AUTO: 9.3 FL (ref 6–12)
RBC # BLD AUTO: 4.36 10*6/MM3 (ref 3.77–5.28)
TIBC SERPL-MCNC: 276 MCG/DL (ref 298–536)
TRANSFERRIN SERPL-MCNC: 185 MG/DL (ref 200–360)
WBC # BLD AUTO: 3.53 10*3/MM3 (ref 3.4–10.8)

## 2021-08-18 PROCEDURE — 82728 ASSAY OF FERRITIN: CPT | Performed by: INTERNAL MEDICINE

## 2021-08-18 PROCEDURE — S0260 H&P FOR SURGERY: HCPCS | Performed by: INTERNAL MEDICINE

## 2021-08-18 PROCEDURE — 45385 COLONOSCOPY W/LESION REMOVAL: CPT | Performed by: INTERNAL MEDICINE

## 2021-08-18 PROCEDURE — 82784 ASSAY IGA/IGD/IGG/IGM EACH: CPT | Performed by: INTERNAL MEDICINE

## 2021-08-18 PROCEDURE — 83516 IMMUNOASSAY NONANTIBODY: CPT | Performed by: INTERNAL MEDICINE

## 2021-08-18 PROCEDURE — 45381 COLONOSCOPY SUBMUCOUS NJX: CPT | Performed by: INTERNAL MEDICINE

## 2021-08-18 PROCEDURE — 25010000002 PROPOFOL 10 MG/ML EMULSION: Performed by: NURSE ANESTHETIST, CERTIFIED REGISTERED

## 2021-08-18 PROCEDURE — 83540 ASSAY OF IRON: CPT | Performed by: INTERNAL MEDICINE

## 2021-08-18 PROCEDURE — 88305 TISSUE EXAM BY PATHOLOGIST: CPT | Performed by: INTERNAL MEDICINE

## 2021-08-18 PROCEDURE — 85025 COMPLETE CBC W/AUTO DIFF WBC: CPT | Performed by: INTERNAL MEDICINE

## 2021-08-18 PROCEDURE — 45380 COLONOSCOPY AND BIOPSY: CPT | Performed by: INTERNAL MEDICINE

## 2021-08-18 PROCEDURE — 84466 ASSAY OF TRANSFERRIN: CPT | Performed by: INTERNAL MEDICINE

## 2021-08-18 PROCEDURE — 43239 EGD BIOPSY SINGLE/MULTIPLE: CPT | Performed by: INTERNAL MEDICINE

## 2021-08-18 DEVICE — DEV CLIP ENDO RESOLUTION360 CONTRL ROT 235CM: Type: IMPLANTABLE DEVICE | Site: DESCENDING COLON | Status: FUNCTIONAL

## 2021-08-18 RX ORDER — SODIUM CHLORIDE 0.9 % (FLUSH) 0.9 %
3 SYRINGE (ML) INJECTION EVERY 12 HOURS SCHEDULED
Status: DISCONTINUED | OUTPATIENT
Start: 2021-08-18 | End: 2021-08-18 | Stop reason: HOSPADM

## 2021-08-18 RX ORDER — SODIUM CHLORIDE 0.9 % (FLUSH) 0.9 %
10 SYRINGE (ML) INJECTION AS NEEDED
Status: DISCONTINUED | OUTPATIENT
Start: 2021-08-18 | End: 2021-08-18 | Stop reason: HOSPADM

## 2021-08-18 RX ORDER — GLYCOPYRROLATE 0.2 MG/ML
INJECTION INTRAMUSCULAR; INTRAVENOUS AS NEEDED
Status: DISCONTINUED | OUTPATIENT
Start: 2021-08-18 | End: 2021-08-18 | Stop reason: SURG

## 2021-08-18 RX ORDER — PROPOFOL 10 MG/ML
VIAL (ML) INTRAVENOUS CONTINUOUS PRN
Status: DISCONTINUED | OUTPATIENT
Start: 2021-08-18 | End: 2021-08-18 | Stop reason: SURG

## 2021-08-18 RX ORDER — LIDOCAINE HYDROCHLORIDE 20 MG/ML
INJECTION, SOLUTION INFILTRATION; PERINEURAL AS NEEDED
Status: DISCONTINUED | OUTPATIENT
Start: 2021-08-18 | End: 2021-08-18 | Stop reason: SURG

## 2021-08-18 RX ORDER — PROPOFOL 10 MG/ML
VIAL (ML) INTRAVENOUS AS NEEDED
Status: DISCONTINUED | OUTPATIENT
Start: 2021-08-18 | End: 2021-08-18 | Stop reason: SURG

## 2021-08-18 RX ORDER — SODIUM CHLORIDE, SODIUM LACTATE, POTASSIUM CHLORIDE, CALCIUM CHLORIDE 600; 310; 30; 20 MG/100ML; MG/100ML; MG/100ML; MG/100ML
30 INJECTION, SOLUTION INTRAVENOUS CONTINUOUS PRN
Status: DISCONTINUED | OUTPATIENT
Start: 2021-08-18 | End: 2021-08-18 | Stop reason: HOSPADM

## 2021-08-18 RX ADMIN — GLYCOPYRROLATE 0.2 MG: 0.2 INJECTION INTRAMUSCULAR; INTRAVENOUS at 09:55

## 2021-08-18 RX ADMIN — LIDOCAINE HYDROCHLORIDE 100 MG: 20 INJECTION, SOLUTION INFILTRATION; PERINEURAL at 09:55

## 2021-08-18 RX ADMIN — PROPOFOL 100 MG: 10 INJECTION, EMULSION INTRAVENOUS at 09:55

## 2021-08-18 RX ADMIN — SODIUM CHLORIDE, POTASSIUM CHLORIDE, SODIUM LACTATE AND CALCIUM CHLORIDE: 600; 310; 30; 20 INJECTION, SOLUTION INTRAVENOUS at 09:49

## 2021-08-18 RX ADMIN — Medication 250 MCG/KG/MIN: at 09:56

## 2021-08-18 NOTE — ANESTHESIA PREPROCEDURE EVALUATION
Anesthesia Evaluation     Patient summary reviewed and Nursing notes reviewed   no history of anesthetic complications:               Airway   Mallampati: II  TM distance: >3 FB  Neck ROM: full  no difficulty expected  Dental - normal exam     Pulmonary - negative pulmonary ROS    breath sounds clear to auscultation  (-) shortness of breath, sleep apnea, decreased breath sounds, wheezes  Cardiovascular - normal exam  Exercise tolerance: good (4-7 METS)    Rhythm: regular  Rate: normal    (-) past MI, angina, CHF, orthopnea, PND, HOYT, PVD      Neuro/Psych- negative ROS  (-) seizures, neuromuscular disease, TIA, CVA, dizziness/light headedness, weakness, numbness  GI/Hepatic/Renal/Endo    (+)   liver disease fatty liver disease history of elevated LFT,   (-) diabetes    Musculoskeletal (-) negative ROS    Abdominal  - normal exam   Substance History - negative use  (-) alcohol use, drug use     OB/GYN negative ob/gyn ROS         Other   blood dyscrasia anemia,   history of cancer (breast) active                    Anesthesia Plan    ASA 3     MAC   (D/W pt. MAC and possible awareness intra op.  Pt understands MAC and GA are not the same and the possibility of GA being required for failed MAC)  intravenous induction     Anesthetic plan, all risks, benefits, and alternatives have been provided, discussed and informed consent has been obtained with: patient.

## 2021-08-18 NOTE — H&P
[facility]  Pre Procedure History & Physical    Chief Complaint: Diarrhea, microscopic colitis, GERD, iron deficiency anemia      HPI: 41-year-old woman with a past medical history as below with a history of microscopic colitis associated with diarrhea, also GERD.  Recent labs with her oncologist for history of breast cancer notable for iron deficiency anemia with low hemoglobin, and ferritin of 8.5.  She otherwise feels okay.    Past Medical History:   Past Medical History:   Diagnosis Date   • Cancer (CMS/HCC)     Bilateral breast cancer; diagnosed 12/2013   • Diarrhea     x3 weeks   • Heart burn    • History of radiation therapy    • Lymphedema     Right hand and wrist   • Slow to wake up after anesthesia     mother       Family History:  Family History   Problem Relation Age of Onset   • No Known Problems Mother    • No Known Problems Father    • Prostate cancer Maternal Uncle 54   • Breast cancer Paternal Aunt 40   • Breast cancer Maternal Grandmother 58   • Breast cancer Maternal Aunt         In her 50s   • Breast cancer Maternal Aunt         In her 50s   • Heart disease Other    • Hypertension Other    • Malig Hyperthermia Neg Hx        Social History:   reports that she has never smoked. She has never used smokeless tobacco. She reports that she does not drink alcohol and does not use drugs.    Medications:   No medications prior to admission.       Allergies:  No known drug allergy    ROS:    Pertinent items are noted in HPI     Objective     not currently breastfeeding.    Physical Exam   Constitutional: Pt is oriented to person, place, and time and well-developed, well-nourished, and in no distress.   HENT:   Mouth/Throat: Oropharynx is clear and moist.   Neck: Normal range of motion. Neck supple.   Cardiovascular: Normal rate, regular rhythm and normal heart sounds.    Pulmonary/Chest: Effort normal and breath sounds normal. No respiratory distress. No  wheezes.   Abdominal:  Soft. Bowel sounds are normal.   Skin: Skin is warm and dry.   Psychiatric: Mood, memory, affect and judgment normal.     Assessment/Plan     Diagnosis: Diarrhea, microscopic colitis, GERD, iron deficiency anemia      Anticipated Surgical Procedure:  EGD  Colonoscopy    The risks, benefits, and alternatives of this procedure have been discussed with the patient or the responsible party- the patient understands and agrees to proceed.

## 2021-08-18 NOTE — DISCHARGE INSTRUCTIONS
For the next 24 hours patient needs to be with a responsible adult.    For 24 hours DO NOT drive, operate machinery, appliances, drink alcohol, make important decisions or sign legal documents.    Start with a light or bland diet if you are feeling sick to your stomach otherwise advance to regular diet as tolerated.    Follow recommendations on procedure report if provided by your doctor.    Call Dr Sales for problems [phone]    Problems may include but not limited to: large amounts of bleeding, trouble breathing, repeated vomiting, severe unrelieved pain, fever or chills.

## 2021-08-18 NOTE — ANESTHESIA POSTPROCEDURE EVALUATION
"Patient: Sravani Sky    Procedure Summary     Date: 08/18/21 Room / Location: [facility] ENDOSCOPY 4 /  [facility]    Anesthesia Start: 0949 Anesthesia Stop: 1044    Procedures:       ESOPHAGOGASTRODUODENOSCOPY WITH BIOPSY (N/A Esophagus)      COLONOSCOPY TO CECUM/TI WITH POLYPECTOMY ( HOT SNARE) AND BIOPSY;SALINELIFT; RESOLUTION CLIP X2 (N/A ) Diagnosis:       Diarrhea, unspecified type      Low ferritin level      (Diarrhea, unspecified type [R19.7])      (Low ferritin level [R79.0])    Surgeons: Sonja Sales MD Provider: Marcos Nichols MD    Anesthesia Type: MAC ASA Status: 3          Anesthesia Type: MAC    Vitals  Vitals Value Taken Time   /69 08/18/21 1106   Temp     Pulse 67 08/18/21 1106   Resp 16 08/18/21 1106   SpO2 100 % 08/18/21 1106           Post Anesthesia Care and Evaluation    Patient location during evaluation: bedside  Patient participation: complete - patient participated  Level of consciousness: awake and alert  Pain management: adequate  Airway patency: patent  Anesthetic complications: No anesthetic complications    Cardiovascular status: acceptable  Respiratory status: acceptable  Hydration status: acceptable    Comments: /69 (BP Location: Right leg, Patient Position: Sitting)   Pulse 67   Resp 16   Ht 152.4 cm (60\")   Wt 56.1 kg (123 lb 11.2 oz)   SpO2 100%   BMI 24.16 kg/m²     Patient is stable postoperatively and has adequately recovered from anesthesia as described above unless otherwise noted      "

## 2021-08-19 ENCOUNTER — TELEPHONE (OUTPATIENT)
Dept: GASTROENTEROLOGY | Facility: CLINIC | Age: 41
End: 2021-08-19

## 2021-08-19 LAB
CYTO UR: NORMAL
LAB AP CASE REPORT: NORMAL
PATH REPORT.FINAL DX SPEC: NORMAL
PATH REPORT.GROSS SPEC: NORMAL
TTG IGA SER-ACNC: >100 U/ML (ref 0–3)
TTG IGG SER-ACNC: 20 U/ML (ref 0–5)

## 2021-08-20 ENCOUNTER — TELEPHONE (OUTPATIENT)
Dept: GASTROENTEROLOGY | Facility: CLINIC | Age: 41
End: 2021-08-20

## 2021-08-20 NOTE — TELEPHONE ENCOUNTER
Called pt and advised of Dr Sales note. Verb understanding.     Pt reports getting online and looking at gluten free diets. She reports that she read she not even eat anything if it has touched something with gluten in it.  Pt is asking how strict does she have to be with her gluten free diet.     Also pt is asking how soon before she see results. Advised will send Dr Sales.    Verb understanding.

## 2021-08-20 NOTE — TELEPHONE ENCOUNTER
----- Message from Sonja Sales MD sent at 8/20/2021  2:18 PM EDT -----  Her colon polyp was a sessile serrated polyp.  Interestingly, biopsies this time not consistent with microscopic colitis.     Biopsies from her EGD show mild gastritis.  Changes of the small bowel consistent with celiac disease as suspected.    Her lab antibodies do confirm celiac disease.  Also her ferritin/iron stores and iron saturation have improved    She does need to go on a gluten-free diet.  I will follow her response to the gluten-free diet by following her antibodies.  I suspect all of her diarrhea symptoms will improve with a gluten-free diet along with her hemoglobin level.    Please place in 5-year colonoscopy recall.    Has follow-up scheduled for November, she should keep that appointment.

## 2021-08-20 NOTE — PROGRESS NOTES
Her colon polyp was a sessile serrated polyp.  Interestingly, biopsies this time not consistent with microscopic colitis.     Biopsies from her EGD show mild gastritis.  Changes of the small bowel consistent with celiac disease as suspected.    Her lab antibodies do confirm celiac disease.  Also her ferritin/iron stores and iron saturation have improved    She does need to go on a gluten-free diet.  I will follow her response to the gluten-free diet by following her antibodies.  I suspect all of her diarrhea symptoms will improve with a gluten-free diet along with her hemoglobin level.    Please place in 5-year colonoscopy recall.    Has follow-up scheduled for November, she should keep that appointment.

## 2021-08-20 NOTE — TELEPHONE ENCOUNTER
It is most reasonable to not eat any gluten-containing products.  I think for now, okay with that only, rather than worrying about if her food has touched something containing gluten.  She should start to see results in terms of her symptoms within a few days of a gluten-free diet.  Will see results with her lab testing usually in a few months.

## 2021-11-10 ENCOUNTER — OFFICE VISIT (OUTPATIENT)
Dept: GASTROENTEROLOGY | Facility: CLINIC | Age: 41
End: 2021-11-10

## 2021-11-10 VITALS
HEIGHT: 60 IN | SYSTOLIC BLOOD PRESSURE: 112 MMHG | DIASTOLIC BLOOD PRESSURE: 65 MMHG | WEIGHT: 134 LBS | BODY MASS INDEX: 26.31 KG/M2

## 2021-11-10 DIAGNOSIS — R19.7 DIARRHEA DUE TO MALABSORPTION: ICD-10-CM

## 2021-11-10 DIAGNOSIS — R79.0 LOW FERRITIN LEVEL: Chronic | ICD-10-CM

## 2021-11-10 DIAGNOSIS — K90.0 CELIAC DISEASE: Primary | Chronic | ICD-10-CM

## 2021-11-10 DIAGNOSIS — R74.01 ELEVATED TRANSAMINASE LEVEL: Chronic | ICD-10-CM

## 2021-11-10 DIAGNOSIS — K90.9 DIARRHEA DUE TO MALABSORPTION: ICD-10-CM

## 2021-11-10 DIAGNOSIS — D50.9 IRON DEFICIENCY ANEMIA, UNSPECIFIED IRON DEFICIENCY ANEMIA TYPE: Chronic | ICD-10-CM

## 2021-11-10 PROCEDURE — 99214 OFFICE O/P EST MOD 30 MIN: CPT | Performed by: INTERNAL MEDICINE

## 2021-11-10 NOTE — PROGRESS NOTES
Chief Complaint   Patient presents with   • Microscopic Colitis   • Diarrhea   • Anemia       Subjective     HPI    Sravani Sky is a 41 y.o. female with a past medical history noted below who presents for follow-up of diarrhea, history of microscopic colitis, iron deficiency anemia and new findings of celiac disease.    She had EGD and colonoscopy in August due to new findings of iron deficiency anemia. This actually confirmed celiac disease. There is no further evidence of lymphocytic colitis which had been diagnosed on colonoscopy 2 years previously.    She has been on a gluten free diet.  Diarrhea has stopped.  However has been having some constipation and bloating.  This is occasional and she doesn't have to take much for this.    She says she is feeling very good with being on a gluten-free diet though it is hard at times.      Today's visit was in the office.  Both the patient and I were wearing face masks and proper hand hygiene was performed before and after the physical exam.           Current Outpatient Medications:   •  Bismuth 262 MG chewable tablet, Chew 2 tablets 3 (Three) Times a Day., Disp: 175 tablet, Rfl: 0  •  Calcium Citrate-Vitamin D (CALCIUM + D PO), Take  by mouth Every Morning., Disp: , Rfl:   •  ibandronate (BONIVA) 150 MG tablet, Take 1 tablet by mouth Every 30 (Thirty) Days., Disp: 3 tablet, Rfl: 3  •  MULTIPLE VITAMINS PO, Take 1 tablet by mouth Every Morning., Disp: , Rfl:   •  venlafaxine (EFFEXOR) 25 MG tablet, One tablet twice daily X 1 week Half tablet twice daily x 1 week 1 tablet daily x 1 week 1 tablet every other day x 1 week then discontinue, Disp: 30 tablet, Rfl: 0      Objective     Vitals:    11/10/21 1512   BP: 112/65         11/10/21  1512   Weight: 60.8 kg (134 lb)     Body mass index is 26.17 kg/m².    Physical Exam  Constitutional:       General: She is not in acute distress.  Pulmonary:      Effort: Pulmonary effort is normal.   Abdominal:      Palpations: Abdomen is  soft.      Tenderness: There is no abdominal tenderness.   Neurological:      Mental Status: She is alert and oriented to person, place, and time.   Psychiatric:         Mood and Affect: Mood normal.         Behavior: Behavior normal.         Thought Content: Thought content normal.         Judgment: Judgment normal.             WBC   Date Value Ref Range Status   08/18/2021 3.53 3.40 - 10.80 10*3/mm3 Final     RBC   Date Value Ref Range Status   08/18/2021 4.36 3.77 - 5.28 10*6/mm3 Final     Hemoglobin   Date Value Ref Range Status   08/18/2021 13.9 12.0 - 15.9 g/dL Final     Hematocrit   Date Value Ref Range Status   08/18/2021 42.3 34.0 - 46.6 % Final     MCV   Date Value Ref Range Status   08/18/2021 97.0 79.0 - 97.0 fL Final     MCH   Date Value Ref Range Status   08/18/2021 31.9 26.6 - 33.0 pg Final     MCHC   Date Value Ref Range Status   08/18/2021 32.9 31.5 - 35.7 g/dL Final     RDW   Date Value Ref Range Status   08/18/2021 15.4 12.3 - 15.4 % Final     RDW-SD   Date Value Ref Range Status   08/18/2021 54.6 (H) 37.0 - 54.0 fl Final     MPV   Date Value Ref Range Status   08/18/2021 9.3 6.0 - 12.0 fL Final     Platelets   Date Value Ref Range Status   08/18/2021 254 140 - 450 10*3/mm3 Final     Neutrophil %   Date Value Ref Range Status   08/18/2021 63.8 42.7 - 76.0 % Final     Lymphocyte %   Date Value Ref Range Status   08/18/2021 21.5 19.6 - 45.3 % Final     Monocyte %   Date Value Ref Range Status   08/18/2021 12.7 (H) 5.0 - 12.0 % Final     Eosinophil %   Date Value Ref Range Status   08/18/2021 1.1 0.3 - 6.2 % Final     Basophil %   Date Value Ref Range Status   08/18/2021 0.6 0.0 - 1.5 % Final     Immature Grans %   Date Value Ref Range Status   08/18/2021 0.3 0.0 - 0.5 % Final     Neutrophils, Absolute   Date Value Ref Range Status   08/18/2021 2.25 1.70 - 7.00 10*3/mm3 Final     Lymphocytes, Absolute   Date Value Ref Range Status   08/18/2021 0.76 0.70 - 3.10 10*3/mm3 Final     Monocytes, Absolute    Date Value Ref Range Status   08/18/2021 0.45 0.10 - 0.90 10*3/mm3 Final     Eosinophils, Absolute   Date Value Ref Range Status   08/18/2021 0.04 0.00 - 0.40 10*3/mm3 Final     Basophils, Absolute   Date Value Ref Range Status   08/18/2021 0.02 0.00 - 0.20 10*3/mm3 Final     Immature Grans, Absolute   Date Value Ref Range Status   08/18/2021 0.01 0.00 - 0.05 10*3/mm3 Final     nRBC   Date Value Ref Range Status   08/18/2021 0.0 0.0 - 0.2 /100 WBC Final       Lab Results   Component Value Date    GLUCOSE 99 05/10/2021    BUN 11 05/10/2021    CREATININE 0.47 (L) 05/10/2021    EGFRIFNONA 146 05/10/2021    BCR 23.4 05/10/2021    CO2 27.3 05/10/2021    CALCIUM 7.7 (L) 05/10/2021    ALBUMIN 3.50 05/10/2021    AST 50 (H) 05/10/2021    ALT 32 05/10/2021         Imaging Results (Last 7 Days)     ** No results found for the last 168 hours. **          I personally reviewed data as detailed below:     The labs listed above.      Office notes from: 5/10/21 heme onc note    Endoscopy procedures and pathology from: 8/18/21 EGD and colonoscopy      No notes on file    Assessment/Plan    1.  Celiac disease: This does explain a lot including both symptoms and lab abnormalities with her anemia and elevated transaminases.  She is now on a gluten-free diet    2.  Iron deficiency anemia/low ferritin: Suspected with above    3.  Elevated transaminase level: Suspected with #1    4.  Diarrhea: I suspect this all along was related to celiac disease though biopsies from 2019 indicated that she had some lymphocytic colitis.  Perhaps this was just related to a reaction to gluten    Plan  Given that she has been on a gluten-free diet for over 2 months, we will go ahead and update her celiac antibodies  We will check her CBC as well as CMP  Encouraged her to continue adherence to a gluten-free diet.  We discussed long-term benefits for her overall health as well as preventing rare intestinal lymphoma from uncontrolled celiac  disease.    Diagnoses and all orders for this visit:    1. Celiac disease (Primary)  -     Tissue Transglutaminase, IgA  -     Tissue Transglutaminase, IgG  -     Gliadin Antibody, IgG  -     IgA  -     CBC & Differential  -     Comprehensive Metabolic Panel    2. Low ferritin level  -     Tissue Transglutaminase, IgA  -     Tissue Transglutaminase, IgG  -     Gliadin Antibody, IgG  -     IgA  -     CBC & Differential  -     Comprehensive Metabolic Panel    3. Iron deficiency anemia, unspecified iron deficiency anemia type  -     Tissue Transglutaminase, IgA  -     Tissue Transglutaminase, IgG  -     Gliadin Antibody, IgG  -     IgA  -     CBC & Differential  -     Comprehensive Metabolic Panel    4. Elevated transaminase level  -     Tissue Transglutaminase, IgA  -     Tissue Transglutaminase, IgG  -     Gliadin Antibody, IgG  -     IgA  -     CBC & Differential  -     Comprehensive Metabolic Panel    5. Diarrhea due to malabsorption        I have discussed the above plan with the patient.  They verbalize understanding and are in agreement with the plan.  They have been advised to contact the office for any questions, concerns, or changes related to their health.    Dictated utilizing Dragon dictation

## 2021-11-11 LAB
ALBUMIN SERPL-MCNC: 5 G/DL (ref 3.8–4.8)
ALBUMIN/GLOB SERPL: 1.9 {RATIO} (ref 1.2–2.2)
ALP SERPL-CCNC: 102 IU/L (ref 44–121)
ALT SERPL-CCNC: 47 IU/L (ref 0–32)
AST SERPL-CCNC: 42 IU/L (ref 0–40)
BASOPHILS # BLD AUTO: 0 X10E3/UL (ref 0–0.2)
BASOPHILS NFR BLD AUTO: 1 %
BILIRUB SERPL-MCNC: 0.3 MG/DL (ref 0–1.2)
BUN SERPL-MCNC: 19 MG/DL (ref 6–24)
BUN/CREAT SERPL: 31 (ref 9–23)
CALCIUM SERPL-MCNC: 9.8 MG/DL (ref 8.7–10.2)
CHLORIDE SERPL-SCNC: 101 MMOL/L (ref 96–106)
CO2 SERPL-SCNC: 20 MMOL/L (ref 20–29)
CREAT SERPL-MCNC: 0.62 MG/DL (ref 0.57–1)
EOSINOPHIL # BLD AUTO: 0.1 X10E3/UL (ref 0–0.4)
EOSINOPHIL NFR BLD AUTO: 2 %
ERYTHROCYTE [DISTWIDTH] IN BLOOD BY AUTOMATED COUNT: 12.2 % (ref 11.7–15.4)
GLIADIN PEPTIDE IGG SER-ACNC: 55 UNITS (ref 0–19)
GLOBULIN SER CALC-MCNC: 2.7 G/DL (ref 1.5–4.5)
GLUCOSE SERPL-MCNC: 93 MG/DL (ref 65–99)
HCT VFR BLD AUTO: 43 % (ref 34–46.6)
HGB BLD-MCNC: 14.9 G/DL (ref 11.1–15.9)
IGA SERPL-MCNC: 293 MG/DL (ref 87–352)
IMM GRANULOCYTES # BLD AUTO: 0 X10E3/UL (ref 0–0.1)
IMM GRANULOCYTES NFR BLD AUTO: 0 %
LYMPHOCYTES # BLD AUTO: 1.3 X10E3/UL (ref 0.7–3.1)
LYMPHOCYTES NFR BLD AUTO: 22 %
MCH RBC QN AUTO: 33.6 PG (ref 26.6–33)
MCHC RBC AUTO-ENTMCNC: 34.7 G/DL (ref 31.5–35.7)
MCV RBC AUTO: 97 FL (ref 79–97)
MONOCYTES # BLD AUTO: 0.5 X10E3/UL (ref 0.1–0.9)
MONOCYTES NFR BLD AUTO: 9 %
NEUTROPHILS # BLD AUTO: 4 X10E3/UL (ref 1.4–7)
NEUTROPHILS NFR BLD AUTO: 66 %
PLATELET # BLD AUTO: 346 X10E3/UL (ref 150–450)
POTASSIUM SERPL-SCNC: 4.8 MMOL/L (ref 3.5–5.2)
PROT SERPL-MCNC: 7.7 G/DL (ref 6–8.5)
RBC # BLD AUTO: 4.44 X10E6/UL (ref 3.77–5.28)
SODIUM SERPL-SCNC: 141 MMOL/L (ref 134–144)
TTG IGA SER-ACNC: 11 U/ML (ref 0–3)
TTG IGG SER-ACNC: 15 U/ML (ref 0–5)
WBC # BLD AUTO: 5.9 X10E3/UL (ref 3.4–10.8)

## 2021-11-12 DIAGNOSIS — R74.01 ELEVATED TRANSAMINASE LEVEL: Primary | ICD-10-CM

## 2021-11-12 NOTE — PROGRESS NOTES
Celiac antibodies much improved, still elevated but down to 11 and previously over 100 so she is doing a good job with her gluten free diet.    Hb improved at 14.9    Liver labs still slightly elevated. This can certainly be associated with celiac disease.   Imaging has shown hepatic steatosis previously    I have ordered a complete liver serologic workup to further assess the liver enzymes to make sure nothing is missed.  She can schedule to have that drawn at her convenience in the next 4-6 weeks.

## 2021-11-15 ENCOUNTER — TELEPHONE (OUTPATIENT)
Dept: GASTROENTEROLOGY | Facility: CLINIC | Age: 41
End: 2021-11-15

## 2021-11-15 NOTE — TELEPHONE ENCOUNTER
----- Message from Sonja Sales MD sent at 11/12/2021  4:08 PM EST -----  Celiac antibodies much improved, still elevated but down to 11 and previously over 100 so she is doing a good job with her gluten free diet.    Hb improved at 14.9    Liver labs still slightly elevated. This can certainly be associated with celiac disease.   Imaging has shown hepatic steatosis previously    I have ordered a complete liver serologic workup to further assess the liver enzymes to make sure nothing is missed.  She can schedule to have that drawn at her convenience in the next 4-6 weeks.

## 2021-11-15 NOTE — TELEPHONE ENCOUNTER
Call to pt.  Advise per DR Sales note  Verb understanding.    Lab appt scheduled for 12/17 @ 9am.

## 2021-11-29 ENCOUNTER — HOSPITAL ENCOUNTER (OUTPATIENT)
Dept: BONE DENSITY | Facility: HOSPITAL | Age: 41
Discharge: HOME | End: 2021-11-29
Admitting: INTERNAL MEDICINE

## 2021-11-29 DIAGNOSIS — Z79.811 LONG TERM CURRENT USE OF AROMATASE INHIBITOR: ICD-10-CM

## 2021-11-29 PROCEDURE — 77080 DXA BONE DENSITY AXIAL: CPT

## 2021-12-06 ENCOUNTER — OFFICE VISIT (OUTPATIENT)
Dept: ONCOLOGY | Facility: CLINIC | Age: 41
End: 2021-12-06

## 2021-12-06 ENCOUNTER — LAB (OUTPATIENT)
Dept: OTHER | Facility: HOSPITAL | Age: 41
End: 2021-12-06

## 2021-12-06 VITALS
DIASTOLIC BLOOD PRESSURE: 82 MMHG | SYSTOLIC BLOOD PRESSURE: 130 MMHG | WEIGHT: 141 LBS | OXYGEN SATURATION: 100 % | HEIGHT: 60 IN | HEART RATE: 76 BPM | RESPIRATION RATE: 16 BRPM | TEMPERATURE: 98.2 F | BODY MASS INDEX: 27.68 KG/M2

## 2021-12-06 DIAGNOSIS — Z15.01 BRCA1 GENETIC CARRIER: Primary | ICD-10-CM

## 2021-12-06 DIAGNOSIS — Z79.811 LONG TERM CURRENT USE OF AROMATASE INHIBITOR: ICD-10-CM

## 2021-12-06 DIAGNOSIS — C50.012 BILATERAL MALIGNANT NEOPLASM INVOLVING BOTH NIPPLE AND AREOLA IN FEMALE, UNSPECIFIED ESTROGEN RECEPTOR STATUS (HCC): ICD-10-CM

## 2021-12-06 DIAGNOSIS — C50.011 BILATERAL MALIGNANT NEOPLASM INVOLVING BOTH NIPPLE AND AREOLA IN FEMALE, UNSPECIFIED ESTROGEN RECEPTOR STATUS (HCC): ICD-10-CM

## 2021-12-06 DIAGNOSIS — C50.011 MALIGNANT NEOPLASM INVOLVING BOTH NIPPLE AND AREOLA OF RIGHT BREAST IN FEMALE, UNSPECIFIED ESTROGEN RECEPTOR STATUS (HCC): ICD-10-CM

## 2021-12-06 DIAGNOSIS — Z15.09 BRCA1 GENETIC CARRIER: Primary | ICD-10-CM

## 2021-12-06 DIAGNOSIS — M81.8 OTHER OSTEOPOROSIS WITHOUT CURRENT PATHOLOGICAL FRACTURE: ICD-10-CM

## 2021-12-06 LAB
ALBUMIN SERPL-MCNC: 4.7 G/DL (ref 3.5–5.2)
ALBUMIN/GLOB SERPL: 1.5 G/DL
ALP SERPL-CCNC: 88 U/L (ref 39–117)
ALT SERPL W P-5'-P-CCNC: 51 U/L (ref 1–33)
ANION GAP SERPL CALCULATED.3IONS-SCNC: 9.5 MMOL/L (ref 5–15)
AST SERPL-CCNC: 42 U/L (ref 1–32)
BASOPHILS # BLD AUTO: 0.06 10*3/MM3 (ref 0–0.2)
BASOPHILS NFR BLD AUTO: 1.2 % (ref 0–1.5)
BILIRUB SERPL-MCNC: 0.3 MG/DL (ref 0–1.2)
BUN SERPL-MCNC: 18 MG/DL (ref 6–20)
BUN/CREAT SERPL: 33.3 (ref 7–25)
CALCIUM SPEC-SCNC: 9.7 MG/DL (ref 8.6–10.5)
CANCER AG125 SERPL QL: 2.9 U/ML (ref 0–38.1)
CHLORIDE SERPL-SCNC: 103 MMOL/L (ref 98–107)
CO2 SERPL-SCNC: 27.5 MMOL/L (ref 22–29)
CREAT SERPL-MCNC: 0.54 MG/DL (ref 0.57–1)
DEPRECATED RDW RBC AUTO: 42.2 FL (ref 37–54)
EOSINOPHIL # BLD AUTO: 0.33 10*3/MM3 (ref 0–0.4)
EOSINOPHIL NFR BLD AUTO: 6.6 % (ref 0.3–6.2)
ERYTHROCYTE [DISTWIDTH] IN BLOOD BY AUTOMATED COUNT: 11.8 % (ref 12.3–15.4)
GFR SERPL CREATININE-BSD FRML MDRD: 124 ML/MIN/1.73
GLOBULIN UR ELPH-MCNC: 3.1 GM/DL
GLUCOSE SERPL-MCNC: 102 MG/DL (ref 65–99)
HCT VFR BLD AUTO: 42.7 % (ref 34–46.6)
HGB BLD-MCNC: 14.7 G/DL (ref 12–15.9)
IMM GRANULOCYTES # BLD AUTO: 0.02 10*3/MM3 (ref 0–0.05)
IMM GRANULOCYTES NFR BLD AUTO: 0.4 % (ref 0–0.5)
LYMPHOCYTES # BLD AUTO: 1.15 10*3/MM3 (ref 0.7–3.1)
LYMPHOCYTES NFR BLD AUTO: 23 % (ref 19.6–45.3)
MCH RBC QN AUTO: 33.4 PG (ref 26.6–33)
MCHC RBC AUTO-ENTMCNC: 34.4 G/DL (ref 31.5–35.7)
MCV RBC AUTO: 97 FL (ref 79–97)
MONOCYTES # BLD AUTO: 0.5 10*3/MM3 (ref 0.1–0.9)
MONOCYTES NFR BLD AUTO: 10 % (ref 5–12)
NEUTROPHILS NFR BLD AUTO: 2.95 10*3/MM3 (ref 1.7–7)
NEUTROPHILS NFR BLD AUTO: 58.8 % (ref 42.7–76)
NRBC BLD AUTO-RTO: 0 /100 WBC (ref 0–0.2)
PLATELET # BLD AUTO: 275 10*3/MM3 (ref 140–450)
PMV BLD AUTO: 9.4 FL (ref 6–12)
POTASSIUM SERPL-SCNC: 4.7 MMOL/L (ref 3.5–5.2)
PROT SERPL-MCNC: 7.8 G/DL (ref 6–8.5)
RBC # BLD AUTO: 4.4 10*6/MM3 (ref 3.77–5.28)
SODIUM SERPL-SCNC: 140 MMOL/L (ref 136–145)
WBC NRBC COR # BLD: 5.01 10*3/MM3 (ref 3.4–10.8)

## 2021-12-06 PROCEDURE — 36415 COLL VENOUS BLD VENIPUNCTURE: CPT

## 2021-12-06 PROCEDURE — 85025 COMPLETE CBC W/AUTO DIFF WBC: CPT | Performed by: INTERNAL MEDICINE

## 2021-12-06 PROCEDURE — 86304 IMMUNOASSAY TUMOR CA 125: CPT | Performed by: INTERNAL MEDICINE

## 2021-12-06 PROCEDURE — 99214 OFFICE O/P EST MOD 30 MIN: CPT | Performed by: INTERNAL MEDICINE

## 2021-12-06 PROCEDURE — 80053 COMPREHEN METABOLIC PANEL: CPT | Performed by: INTERNAL MEDICINE

## 2021-12-06 NOTE — PROGRESS NOTES
Subjective      EASONS FOR FOLLOWUP:   1. [id number] triple negative right breast cancer.   2. T1Nx left breast cancer invasive ductal carcinoma, ER/[place] positive, HER2 negative.   3. BRCA 1 positive.   4. Right internal mammary node positive on CT scan.   5. Neoadjuvant dose dense Adriamycin/Cytoxan, weekly Taxol started on 01/20/2014 with plan for axillary dissection on the right and sentinel node on left, bilateral mastectomies plus radiation to the right breast and internal mammary nodes.   6. Addition of carboplatin with weekly Taxol to start on 03/24/2014.   7. Carboplatin held on 05/05/2014 due to thrombocytopenia.   8. The patient was seen on 08/05/2014, postoperatively; bilateral mastectomies and sentinel nodes with a 5 mm residua l grade 3 tumor in the right breast with two negative sentinel nodes (cannot tell if the node with a clip was removed). Complete response in the left breast with two negative sentinel nodes. The decision was made to consider radiation because of her pret reatment internal mammary node on the right and tamoxifen therapy for the hormone positive breast cancer on right. Clinical trials are being looked into.   9. Additional surgery to remove the lymph node with the clip in the right axilla, 3 nodes removed and negative for metastatic disease. Tamoxifen started September 2014.   10. Radiation completed in October 2014. Tamoxifen on hold because of some neurological signs.   11. Not eligible for NSABP B-55 because of tamoxifen as of 10/20/2014.   12. Tamoxifen resumed on 11/18/2014 on every other day dose.   13. CT scans done for right neck discomfort with negative scans except for radiation scarring in the right lung apex.   14. Post total abdominal hysterectomy and BSO in 01/2015. Tamoxifen resumed with switch to an AI in 7/15  15. Lymphedema, right arm.                   16.  Iron deficiency anemia    History of Present Illness  patient is a 40-year-old white female with BRCA1  positivity and bilateral breast cancers currently off antihormonal therapy after 7 years of treatment (on Arimidex for the last 5 years and  2year of tamoxifen )      She has been found to have celiac disease which explains all her GI symptoms and since going on a gluten-free diet GI symptoms are significantly improved-  Recent GI upper and lower endoscopy were negative except for villous blunting from celiac disease and multiple colon polyps for which follow-up as needed      She is off her Effexor and feels better overall but she continues to have some discomfort in the left lower quadrant and since her colonoscopy was totally benign I think we need to do a CAT scan to evaluate this further.    Her bone density shows worsening osteoporosis and I think we can stop her Boniva and switch to Prolia    Active Ambulatory Problems     Diagnosis Date Noted   • Malignant neoplasm of female breast (HCC) 04/15/2016   • BRCA1 genetic carrier 04/15/2016   • Anemia 08/04/2016   • Long term current use of aromatase inhibitor 08/04/2016   • Osteopenia 08/04/2016   • Diarrhea of presumed infectious origin 08/06/2019   • Elevated transaminase level 08/07/2019   • Fatty liver 08/19/2019   • Diarrhea due to malabsorption 09/12/2019   • Lymphocytic colitis 09/12/2019   • Iron adverse reaction 05/10/2021   • Diarrhea 06/23/2021   • Low ferritin level 06/23/2021   • Celiac disease 11/10/2021     Resolved Ambulatory Problems     Diagnosis Date Noted   • No Resolved Ambulatory Problems     Past Medical History:   Diagnosis Date   • Cancer (HCC)    • Heart burn    • History of radiation therapy    • Lymphedema    • Slow to wake up after anesthesia      Past Surgical History:   Procedure Laterality Date   • BREAST AUGMENTATION Right 11/5/2019    Procedure: RIGHT CAPSULOTOMY;  Surgeon: MADELYN Arreola MD;  Location: [facility];  Service: Plastics   • BREAST IMPLANT SURGERY Left    • BREAST SURGERY Bilateral 2014    Mastectomy   • BREAST  TISSUE EXPANDER REMOVAL INSERTION OF IMPLANT     •  SECTION  2007, 2010    X2   • COLONOSCOPY N/A 2019    Hypertrophied anal papilla, NBIH   • COLONOSCOPY N/A 2021    Procedure: COLONOSCOPY TO CECUM/TI WITH POLYPECTOMY ( HOT SNARE) AND BIOPSY;SALINELIFT; RESOLUTION CLIP X2;  Surgeon: Sonja Sales MD;  Location: [facility] ENDOSCOPY;  Service: Gastroenterology;  Laterality: N/A;  microscopic colitis; diarrhea; iron def anemia  POST: COLON POLYP;  HEMORRHOIDS   • ENDOSCOPY N/A 2021    Procedure: ESOPHAGOGASTRODUODENOSCOPY WITH BIOPSY;  Surgeon: Sonja Sales MD;  Location: [facility] ENDOSCOPY;  Service: Gastroenterology;  Laterality: N/A;  microscopic colitis; diarrhea; iron def anemia  POST:DUODENITIS  R/O CELIAC DISEASE; GASTRITIS   • FAT GRAFTING Right 2019    Procedure: RIGHT FAT GRAFTING WITH REVOLVE;  Surgeon: MADELYN Arreola MD;  Location: [facility] MAIN OR;  Service: Plastics   • HYSTERECTOMY      Total   • MASTECTOMY           OB/GYN HISTORY: She is  2, para 2. Menarche at age 10. First childbirth wa s at age 26. She breast-fed her second child for 6 weeks. She was on birth control until her first childbirth and then used a Mirena IUD which she just had removed 3 months ago.     FAMILY HISTORY:  Parents are in their late 50s and are healthy. She has no siblings. She has a paternal aunt with breast cancer in 40s, maternal grandmother with breast cancer at age 58, and both of the grandmother'  s sisters had breast cancer in their 50s. She has a maternal uncle with prostate cancer at age 54. There is no ovarian, uterine or pancreatic cancer in the family that she is aware of.      HEMATOLOGIC/ONCOLOGIC HISTORY:   The patient was seen on 2014, having had bilateral mastectomies. Right breast had a 5 mm residual invasive tumor, grade 3, with clear margins, two negative sentinel nodes (I cannot tell if the lymph node with the clip was remove d and we will have to assess  this). Left breast shows a complete pathological response with two negative sentinel nodes. The decision was made to review her for radiation therapy because of enlarged right internal mammary node, pretreatment, and to star t tamoxifen. She is going to have her ovaries removed later in the year when she has expanders put in and we can switch to an AI at that point. I am looking into clinical trials for BCRA positive patients with residual disease and for triple negative canc ers with residual disease but the 5 mm tumor may disqualify her.        The patient was seen on 09/02/2014 with additional surgery to remove the lymph node in her axilla with the clip in it which was thankfully benign in addition to 3 other nodes which were nega tive. Radiation to the internal mammary chain planned plus tamoxifen for 5 years. The NSABP B55 study is expected to have an amendment including hormone positive patients and we will revisit this issue after her radiation. Her port has been removed.        NSA BP amendment has not yet gone through to include patients on tamoxifen, therefore, she is not eligible for NSABP B-55 as of 10/21/2014. Tamoxifen held for 1 month because of some dizzy spells and hysterectomy and oophorectomy planned for early 2015, at wh ich point we will switch to Arimidex.        The patient was seen on 02/25/2014 having CT scans because a preop chest x-ray before hysterectomy showed a shadow in the right apex. CT scans showed what seemed to be radiation change in the apex of the right lung. No abnormality in the neck or abdomen and a residual small seroma in the right axilla. Path reports on her hysterectomy and BSO were benign and because of her intolerance of tamoxifen, we started her on Effexor 12.5 b.i.d. to be increased as tolerated and t hen to resume tamoxifen every other day for a month and then go to daily if possible. Once she is stable at this, we will try and switch over to an AI if she can  tolerate this.        The patient was seen on 04/21/2015 with a CT scan done to followup on changes noted on a previous CT scans, which were felt to be radiation-related. Thankfully, the scan shows diminished consolidation of the pleural surface and a small subpleural air space disease, stable at 8 x 4 mm and images through the upper abdomen and the re s t of the lung were negative. She is up to 37.5 of Effexor with good control of her hot flashes and taking her tamoxifen every other day and we have asked her to increase her tamoxifen to daily and we will see her back in 3 months and if she is tolerating this well, consider switching to an AI at that time.        Patient seen on 07/21/2015, tolerating tamoxifen well at full dose for the last 3 months since her hysterectomy. Plans to switch to Femara in 2-3 months. Nocturnal cough, which I suspect is allergic or reflux related. Consider a steroid inhaler.        7/17 Arimidex since July 2015.  Overall she is tolerating Arimidex well with very few hot flashes because of the Effexor but she is clearly not herself due to the postmenopausal state and has less energy low libido and I think some amount of depression which is not unusual in this situation.  Her mother had osteoporosis and her baseline bone density is osteopenia which is fairly severe and I added Boniva monthly in addition to calcium and vitamin D    .Her neck pain is resolved she is sleeping okay appetite is fair and apart from fatigue which is related to her postmenopausal state she appears to be doing okay.  She is a little more irritable than usual but realizes this is part of the postmenopausal state  Complains of some irritable bowel type symptoms with constipation alternating with diarrhea and she is a little more anemic than usual and her ferritin was low and she was given oral iron with good improvement in her hemoglobin.  I'm reluctant to add another test with colonoscopy for at this point and we  will wait and see how she does off iron and if her hemoglobin drops and her iron is low again the colonoscopy and EGD will be scheduled .  Her lymphedema is a little better this summer    She continues to have discomfort over the right chest wall where she received radiation and does not feel that the implant is his flexible and stiffer and because of the radiation fibrosis on CAT scan has been concerned about issues here.    Her liver function tests have been barely up on and off and therefore we will do another ultrasound to evaluate the liver and gallbladder.  2/19  she continues to have some tenderness in the rib cage below the right implant and is worried about this.  We did a bone scan in November which was benign but her bone density shows osteoporosis and she has been on Boniva more regularly now but I still want to repeat the bone density in November and add Prolia if it is not improved  8/19  CAT scan of the chest abdomen pelvis showed no evidence of recurrent cancer but a fatty liver was noted  I told her to follow-up with the gastroenterologist to ask how best to deal with her fatty liver and also how long to stay on the steroids as she has no follow-up appointment with them-lymphocytic colitis diagnosed and treated with steroids?  Related to Arimidex    10/19  Switch to tamoxifen again because bone density shows osteoporosis in her spine and both hips patient declined Prolia will add Boniva    5/21  She is doing well overall but had a recurrence of her lymphocytic colitis and Initially we thought maybe Arimidex was causing this colitis so we switch her to tamoxifen she still had a recurrence therefore I do not think this is drug related-she has not been taking the Boniva so this is not the cause but she is on Effexor and apparently this may cause this so I think it is time to get her off the Effexor which she can only do if she comes off the tamoxifen.  She does complain of some left lower quadrant  discomfort on and off and I think she needs repeat colonoscopy because her iron stores are low and she is iron deficient    At this point she has been on hormonal blockade for almost 7 years for small tumor which was completely gone at the time of surgery after neoadjuvant chemotherapy and I think this is adequate treatment although she is very scared to stop the tamoxifen      She has had worsening fatigue and I think this is due to the iron deficiency.   she always has a little discomfort below the right implant which is stable    She is very concerned because of significant weight gain of 20 pounds in the last year some of which is from prednisone and some of which is due to the coronavirus pandemic and lack of exercise and some because of her postmenopausal state we discussed strategies for diet and exercise but hopefully after she stops her tamoxifen weans off the Effexor and this will improve    Ca1 25 is negative -we did an ultrasound of the pancreas last year because insurance would not approve her MRI because of her age    We discussed that this is a good time point and she has had no recurrence of her triple negative breast cancer that had residual disease after neoadjuvant chemo at 5 years.  For left breast cancer with which is hormone positive she had a path CR but we have opted to continue hormonal blockade for total of 7 years  which would be up in September but because of her GI complaints and weight gain I think it is reasonable to stop      SOCIAL HISTORY: She is , lives with her . She is school psychologist. She does not smoke. She drinks very minimally. No trouble with drug addiction.    FAMILY HISTORY: Parents are in their late 50s and are healthy. She has no siblings. She has a paternal aunt with breast cancer in 40s, maternal grandmother with breast cancer at age 58,   and both of the grandmother' s sisters had breast cancer in their 50s. She has a maternal uncle with prostate  "cancer at age 54. There is no ovarian, uterine or pancreatic cancer in the family that she is aware of. BRCA 1 positive    Review of Systems   Constitutional: Positive for unexpected weight change (Weight gain). Negative for fatigue (Worse).   HENT: Negative.    Cardiovascular: Negative.    Gastrointestinal: Positive for abdominal pain (Left lower quadrant discomfort on and off). Negative for diarrhea (stable ).   Endocrine: Positive for heat intolerance (same ).   Genitourinary: Negative.    Neurological: Negative.    Psychiatric/Behavioral: Positive for decreased concentration (same ), dysphoric mood (same ) and sleep disturbance (little better ). The patient is nervous/anxious (better ).    All other systems reviewed and are negative.     A comprehensive 14 point review of systems was performed and was negative except as mentioned.    Medications:  The current medication list was reviewed in the EMR    ALLERGIES:    Allergies   Allergen Reactions   • No Known Drug Allergy        Objective      Vitals:    12/06/21 0913   BP: 130/82   Pulse: 76   Resp: 16   Temp: 98.2 °F (36.8 °C)   TempSrc: Temporal   SpO2: 100%   Weight: 64 kg (141 lb)   Height: 152.4 cm (60\")   PainSc: 0-No pain     Video visit no vitals  Current Status 5/10/2021   ECOG score 0       Physical Exam   Pulmonary/Chest:           GENERAL:  Well-developed, well-nourished in no acute distress.   SKIN:  Warm, dry without rashes, purpura or petechiae.  HEAD:  Normocephalic.  EYES:  Pupils equal, round and reactive to light.  EOMs intact.  Conjunctivae normal.  EARS:  Hearing intact.  NOSE:  Septum midline.  No excoriations or nasal discharge.  MOUTH:  Tongue is well-papillated; no stomatitis or ulcers.  Lips normal.  THROAT:  Oropharynx without lesions or exudates.  NECK:  Supple with good range of motion; no thyromegaly or masses, no JVD.  LYMPHATICS:  No cervical, supraclavicular, axillary or inguinal adenopathy.  CHEST:  Lungs clear to percussion and " auscultation. Good airflow.  BREASTS: Bilateral implants benign there is no axillary adenopathy.Tenderness in the subcostal and intramammary rib cage right greater than left -point tenderness in the costochondral junction below the right breast-stable   CARDIAC:  Regular rate and rhythm without murmurs, rubs or gallops. Normal S1,S2.  ABDOMEN:  Soft, nontender with no organomegaly or masses.  No mass in the left lower quadrant  EXTREMITIES:  No clubbing, cyanosis or edema.  NEUROLOGICAL:  Cranial Nerves II-XII grossly intact.  No focal neurological deficits.  PSYCHIATRIC:  Normal affect and mood.     I have reexamined the patient and the results are consistent with the previously documented exam. Shiv Moreno MD       RECENT LABS:  Hematology WBC   Date Value Ref Range Status   12/06/2021 5.01 3.40 - 10.80 10*3/mm3 Final   11/10/2021 5.9 3.4 - 10.8 x10E3/uL Final     RBC   Date Value Ref Range Status   12/06/2021 4.40 3.77 - 5.28 10*6/mm3 Final   11/10/2021 4.44 3.77 - 5.28 x10E6/uL Final     Hemoglobin   Date Value Ref Range Status   12/06/2021 14.7 12.0 - 15.9 g/dL Final     Hematocrit   Date Value Ref Range Status   12/06/2021 42.7 34.0 - 46.6 % Final     Platelets   Date Value Ref Range Status   12/06/2021 275 140 - 450 10*3/mm3 Final      FINDINGS: Sonographic evaluation of the liver and selective structures  of the right upper quadrant was performed. The right kidney has a normal  appearance. No abnormality of the liver is appreciated. The liver  measures on the order of 17.5 cm in anterior to posterior dimension. The  gallbladder has a normal appearance. The common bile duct measures 0.4  cm in diameter. Per the sonographer, the patient was nontender in the  right upper quadrant during the examination. Visualized portion of the  pancreas appears normal.      IMPRESSION:  Negative liver sonogram.      This report was finalized on 4/14/2017       NUCLEAR MEDICINE WHOLE BODY BONE SCAN     IMPRESSION:  No  scintigraphic evidence for bony metastatic disease.     This report was finalized on 11/17/2018     CT CHEST, ABDOMEN, AND PELVIS WITH IV CONTRAST  IMPRESSION:  1. There is no convincing evidence for metastatic disease.  2. Paucity of formed stool within the colon. There is no colonic  thickening to suggest colitis.  3. Hepatic steatosis.         Assessment/Plan    1.[id number] triple negative right breast cancer. [id number] dose dense Adriamycin Cytoxan and weekly carbotaxol-on tamoxifen-switch to Arimidex after oophorectomy in July 2015    2.T1Nx left breast cancer invasive ductal carcinoma, ER/[place] positive, HER2 negative. [id number]  · 5 years of Arimidex +2 years of tamoxifen given adjuvant  3. BRCA 1 positive. Post hysterectomy and oophorectomy                   4.  Lymphedema right arm improved with a sleeve                  5.  Mild depression declines any treatment for this now                  6.  Mild anemia due to iron deficiency responding to oral iron                  7.  Osteopenia moderately severe-declined Prolia -Boniva  was started   Osteoporosis documented in 11/21 Prolia to start Boniva to stop                  8.  Mildly elevated LFTs with negative liver and pancreas ultrasound -                     CAT scan consistent with fatty liver                   9.  Exacerbation of hemorrhoidal bleeding due to diarrhea                           10.  Lymphocytic colitis on budesonide  Celiac disease diagnosed by biopsy and serology in 10/21    Plan  1  Check Ca1 25 annually  2.. CT abdomen for left lower quadrant discomfort with negative colonoscopy call for results-with her age and negative family history MRCP will be delayed till age 45  3. Prolia for osteoporosis in 2 weeks  3. return in 6 months for follow-up.               12/6/2021      CC:

## 2021-12-15 ENCOUNTER — INFUSION (OUTPATIENT)
Dept: ONCOLOGY | Facility: HOSPITAL | Age: 41
End: 2021-12-15

## 2021-12-15 ENCOUNTER — APPOINTMENT (OUTPATIENT)
Dept: OTHER | Facility: HOSPITAL | Age: 41
End: 2021-12-15

## 2021-12-15 ENCOUNTER — HOSPITAL ENCOUNTER (OUTPATIENT)
Dept: CT IMAGING | Facility: HOSPITAL | Age: 41
Discharge: HOME | End: 2021-12-15
Admitting: INTERNAL MEDICINE

## 2021-12-15 DIAGNOSIS — Z15.09 BRCA1 GENETIC CARRIER: ICD-10-CM

## 2021-12-15 DIAGNOSIS — M81.8 OTHER OSTEOPOROSIS WITHOUT CURRENT PATHOLOGICAL FRACTURE: ICD-10-CM

## 2021-12-15 DIAGNOSIS — M81.8 OTHER OSTEOPOROSIS WITHOUT CURRENT PATHOLOGICAL FRACTURE: Primary | ICD-10-CM

## 2021-12-15 DIAGNOSIS — Z15.01 BRCA1 GENETIC CARRIER: ICD-10-CM

## 2021-12-15 LAB
ALBUMIN SERPL-MCNC: 5 G/DL (ref 3.5–5.2)
ALBUMIN/GLOB SERPL: 1.4 G/DL
ALP SERPL-CCNC: 96 U/L (ref 39–117)
ALT SERPL W P-5'-P-CCNC: 44 U/L (ref 1–33)
ANION GAP SERPL CALCULATED.3IONS-SCNC: 11.4 MMOL/L (ref 5–15)
AST SERPL-CCNC: 40 U/L (ref 1–32)
BASOPHILS # BLD AUTO: 0.04 10*3/MM3 (ref 0–0.2)
BASOPHILS NFR BLD AUTO: 0.8 % (ref 0–1.5)
BILIRUB SERPL-MCNC: 0.3 MG/DL (ref 0–1.2)
BUN SERPL-MCNC: 17 MG/DL (ref 6–20)
BUN/CREAT SERPL: 32.1 (ref 7–25)
CALCIUM SPEC-SCNC: 10.2 MG/DL (ref 8.6–10.5)
CANCER AG125 SERPL QL: 3.3 U/ML (ref 0–38.1)
CHLORIDE SERPL-SCNC: 101 MMOL/L (ref 98–107)
CO2 SERPL-SCNC: 27.6 MMOL/L (ref 22–29)
CREAT SERPL-MCNC: 0.53 MG/DL (ref 0.57–1)
DEPRECATED RDW RBC AUTO: 40.5 FL (ref 37–54)
EOSINOPHIL # BLD AUTO: 0.69 10*3/MM3 (ref 0–0.4)
EOSINOPHIL NFR BLD AUTO: 14.2 % (ref 0.3–6.2)
ERYTHROCYTE [DISTWIDTH] IN BLOOD BY AUTOMATED COUNT: 11.6 % (ref 12.3–15.4)
GFR SERPL CREATININE-BSD FRML MDRD: 127 ML/MIN/1.73
GLOBULIN UR ELPH-MCNC: 3.7 GM/DL
GLUCOSE SERPL-MCNC: 96 MG/DL (ref 65–99)
HCT VFR BLD AUTO: 45.4 % (ref 34–46.6)
HGB BLD-MCNC: 15.7 G/DL (ref 12–15.9)
IMM GRANULOCYTES # BLD AUTO: 0.01 10*3/MM3 (ref 0–0.05)
IMM GRANULOCYTES NFR BLD AUTO: 0.2 % (ref 0–0.5)
LYMPHOCYTES # BLD AUTO: 1.14 10*3/MM3 (ref 0.7–3.1)
LYMPHOCYTES NFR BLD AUTO: 23.5 % (ref 19.6–45.3)
MAGNESIUM SERPL-MCNC: 2.3 MG/DL (ref 1.6–2.6)
MCH RBC QN AUTO: 32.8 PG (ref 26.6–33)
MCHC RBC AUTO-ENTMCNC: 34.6 G/DL (ref 31.5–35.7)
MCV RBC AUTO: 94.8 FL (ref 79–97)
MONOCYTES # BLD AUTO: 0.43 10*3/MM3 (ref 0.1–0.9)
MONOCYTES NFR BLD AUTO: 8.9 % (ref 5–12)
NEUTROPHILS NFR BLD AUTO: 2.54 10*3/MM3 (ref 1.7–7)
NEUTROPHILS NFR BLD AUTO: 52.4 % (ref 42.7–76)
NRBC BLD AUTO-RTO: 0 /100 WBC (ref 0–0.2)
PHOSPHATE SERPL-MCNC: 4.6 MG/DL (ref 2.5–4.5)
PLATELET # BLD AUTO: 288 10*3/MM3 (ref 140–450)
PMV BLD AUTO: 9.1 FL (ref 6–12)
POTASSIUM SERPL-SCNC: 4.8 MMOL/L (ref 3.5–5.2)
PROT SERPL-MCNC: 8.7 G/DL (ref 6–8.5)
RBC # BLD AUTO: 4.79 10*6/MM3 (ref 3.77–5.28)
SODIUM SERPL-SCNC: 140 MMOL/L (ref 136–145)
WBC NRBC COR # BLD: 4.85 10*3/MM3 (ref 3.4–10.8)

## 2021-12-15 PROCEDURE — 86304 IMMUNOASSAY TUMOR CA 125: CPT | Performed by: INTERNAL MEDICINE

## 2021-12-15 PROCEDURE — 96372 THER/PROPH/DIAG INJ SC/IM: CPT

## 2021-12-15 PROCEDURE — 85025 COMPLETE CBC W/AUTO DIFF WBC: CPT | Performed by: INTERNAL MEDICINE

## 2021-12-15 PROCEDURE — 36415 COLL VENOUS BLD VENIPUNCTURE: CPT | Performed by: INTERNAL MEDICINE

## 2021-12-15 PROCEDURE — 83735 ASSAY OF MAGNESIUM: CPT | Performed by: INTERNAL MEDICINE

## 2021-12-15 PROCEDURE — 84100 ASSAY OF PHOSPHORUS: CPT | Performed by: INTERNAL MEDICINE

## 2021-12-15 PROCEDURE — 25010000002 DENOSUMAB 60 MG/ML SOLUTION PREFILLED SYRINGE: Performed by: INTERNAL MEDICINE

## 2021-12-15 PROCEDURE — 74177 CT ABD & PELVIS W/CONTRAST: CPT

## 2021-12-15 PROCEDURE — 0 DIATRIZOATE MEGLUMINE & SODIUM PER 1 ML: Performed by: INTERNAL MEDICINE

## 2021-12-15 PROCEDURE — 80053 COMPREHEN METABOLIC PANEL: CPT | Performed by: INTERNAL MEDICINE

## 2021-12-15 PROCEDURE — 25010000002 IOPAMIDOL 61 % SOLUTION: Performed by: INTERNAL MEDICINE

## 2021-12-15 RX ADMIN — IOPAMIDOL 85 ML: 612 INJECTION, SOLUTION INTRAVENOUS at 09:14

## 2021-12-15 RX ADMIN — DENOSUMAB 60 MG: 60 INJECTION SUBCUTANEOUS at 09:40

## 2021-12-15 RX ADMIN — DIATRIZOATE MEGLUMINE AND DIATRIZOATE SODIUM 30 ML: 660; 100 LIQUID ORAL; RECTAL at 08:20

## 2021-12-15 NOTE — NURSING NOTE
Arrived ambulatory for prolia injection. Indication and side effects reviewed. Denies recent dental work. Labs and medications verified. Prolia administered in left arm without incidence. Instructed to call for any concerns or questions.  Pt vu and discharged ambulatory.

## 2021-12-17 ENCOUNTER — APPOINTMENT (OUTPATIENT)
Dept: GENERAL RADIOLOGY | Facility: HOSPITAL | Age: 41
End: 2021-12-17

## 2021-12-17 PROCEDURE — 73562 X-RAY EXAM OF KNEE 3: CPT | Performed by: EMERGENCY MEDICINE

## 2022-05-10 ENCOUNTER — OFFICE VISIT (OUTPATIENT)
Dept: GASTROENTEROLOGY | Facility: CLINIC | Age: 42
End: 2022-05-10

## 2022-05-10 VITALS
DIASTOLIC BLOOD PRESSURE: 72 MMHG | HEIGHT: 60 IN | SYSTOLIC BLOOD PRESSURE: 132 MMHG | TEMPERATURE: 97.3 F | BODY MASS INDEX: 23.36 KG/M2 | WEIGHT: 119 LBS

## 2022-05-10 DIAGNOSIS — R74.01 ELEVATED TRANSAMINASE LEVEL: Chronic | ICD-10-CM

## 2022-05-10 DIAGNOSIS — K90.9 DIARRHEA DUE TO MALABSORPTION: ICD-10-CM

## 2022-05-10 DIAGNOSIS — R19.7 DIARRHEA DUE TO MALABSORPTION: ICD-10-CM

## 2022-05-10 DIAGNOSIS — Z85.3 HISTORY OF BREAST CANCER: ICD-10-CM

## 2022-05-10 DIAGNOSIS — K90.0 CELIAC DISEASE: Primary | Chronic | ICD-10-CM

## 2022-05-10 DIAGNOSIS — K76.0 FATTY LIVER: ICD-10-CM

## 2022-05-10 PROCEDURE — 99214 OFFICE O/P EST MOD 30 MIN: CPT | Performed by: INTERNAL MEDICINE

## 2022-05-10 NOTE — PROGRESS NOTES
Chief Complaint   Patient presents with   • Celiac Disease       Subjective     HPI    Sravani Sky is a 42 y.o. female with a past medical history noted below who presents for for follow-up of diarrhea, history of microscopic colitis, iron deficiency anemia celiac disease, elevated liver enzymes and imaging showing fatty liver.      She has lost about 15# -- cut carbs, intermittent fasting.    No diarrhea.  Goes days between BMs.  No pain in between BMs, not hard or difficult to have a BM but does feel better after she has a BM.      Doing well from a gluten free diet.  Does know if she gets cross contamination, gets heartburn and diarrhea.     Does have a vague left lower quadrant uncomfortable sensation.  She is not quite clear if it is related to bowel movements or not.  Reassuring CT scan findings and reassuring recent colonoscopy.    She is drinking up to 2 glasses of wine most nights of the week.     She had EGD and colonoscopy in August 2021 due to new findings of iron deficiency anemia. This actually confirmed celiac disease. There is no further evidence of lymphocytic colitis which had been diagnosed on colonoscopy 2 years previously.    Today's visit was in the office.  Both the patient and I were wearing face masks and proper hand hygiene was performed before and after the physical exam.           Current Outpatient Medications:   •  Calcium Citrate-Vitamin D (CALCIUM + D PO), Take  by mouth Every Morning., Disp: , Rfl:   •  MULTIPLE VITAMINS PO, Take 1 tablet by mouth Every Morning., Disp: , Rfl:   •  naproxen sodium (ANAPROX) 550 MG tablet, Take 1 tablet by mouth 2 (Two) Times a Day With Meals., Disp: 15 tablet, Rfl: 0      Objective     Vitals:    05/10/22 1531   BP: 132/72   Temp: 97.3 °F (36.3 °C)         05/10/22  1531   Weight: 54 kg (119 lb)     Body mass index is 23.24 kg/m².    Physical Exam        WBC   Date Value Ref Range Status   12/15/2021 4.85 3.40 - 10.80 10*3/mm3 Final   11/10/2021  5.9 3.4 - 10.8 x10E3/uL Final     RBC   Date Value Ref Range Status   12/15/2021 4.79 3.77 - 5.28 10*6/mm3 Final   11/10/2021 4.44 3.77 - 5.28 x10E6/uL Final     Hemoglobin   Date Value Ref Range Status   12/15/2021 15.7 12.0 - 15.9 g/dL Final     Hematocrit   Date Value Ref Range Status   12/15/2021 45.4 34.0 - 46.6 % Final     MCV   Date Value Ref Range Status   12/15/2021 94.8 79.0 - 97.0 fL Final     MCH   Date Value Ref Range Status   12/15/2021 32.8 26.6 - 33.0 pg Final     MCHC   Date Value Ref Range Status   12/15/2021 34.6 31.5 - 35.7 g/dL Final     RDW   Date Value Ref Range Status   12/15/2021 11.6 (L) 12.3 - 15.4 % Final     RDW-SD   Date Value Ref Range Status   12/15/2021 40.5 37.0 - 54.0 fl Final     MPV   Date Value Ref Range Status   12/15/2021 9.1 6.0 - 12.0 fL Final     Platelets   Date Value Ref Range Status   12/15/2021 288 140 - 450 10*3/mm3 Final     Neutrophil %   Date Value Ref Range Status   12/15/2021 52.4 42.7 - 76.0 % Final     Lymphocyte %   Date Value Ref Range Status   12/15/2021 23.5 19.6 - 45.3 % Final     Monocyte %   Date Value Ref Range Status   12/15/2021 8.9 5.0 - 12.0 % Final     Eosinophil %   Date Value Ref Range Status   12/15/2021 14.2 (H) 0.3 - 6.2 % Final     Basophil %   Date Value Ref Range Status   12/15/2021 0.8 0.0 - 1.5 % Final     Immature Grans %   Date Value Ref Range Status   12/15/2021 0.2 0.0 - 0.5 % Final     Neutrophils, Absolute   Date Value Ref Range Status   12/15/2021 2.54 1.70 - 7.00 10*3/mm3 Final     Lymphocytes, Absolute   Date Value Ref Range Status   12/15/2021 1.14 0.70 - 3.10 10*3/mm3 Final     Monocytes, Absolute   Date Value Ref Range Status   12/15/2021 0.43 0.10 - 0.90 10*3/mm3 Final     Eosinophils, Absolute   Date Value Ref Range Status   12/15/2021 0.69 (H) 0.00 - 0.40 10*3/mm3 Final     Basophils, Absolute   Date Value Ref Range Status   12/15/2021 0.04 0.00 - 0.20 10*3/mm3 Final     Immature Grans, Absolute   Date Value Ref Range Status    12/15/2021 0.01 0.00 - 0.05 10*3/mm3 Final     nRBC   Date Value Ref Range Status   12/15/2021 0.0 0.0 - 0.2 /100 WBC Final       Lab Results   Component Value Date    GLUCOSE 96 12/15/2021    BUN 17 12/15/2021    CREATININE 0.53 (L) 12/15/2021    EGFRIFNONA 127 12/15/2021    EGFRIFAFRI 130 11/10/2021    BCR 32.1 (H) 12/15/2021    CO2 27.6 12/15/2021    CALCIUM 10.2 12/15/2021    PROTENTOTREF 7.7 11/10/2021    ALBUMIN 5.00 12/15/2021    LABIL2 1.9 11/10/2021    AST 40 (H) 12/15/2021    ALT 44 (H) 12/15/2021        CT a/p  IMPRESSION:  1. No acute abnormality within the abdomen and pelvis.  2. Diffuse hepatic steatosis.     Radiation dose reduction techniques were utilized, including automated  exposure control and exposure modulation based on body size.     This report was finalized on 12/15/2021 3:13 PM by Dr. Maggie Antonio M.D.         I personally reviewed data as detailed below:     The labs listed above.    The radiology studies listed above.    Office notes from: 12/6/21 oncology note    Assessment and Plan    1. Celiac disease    2. Diarrhea due to malabsorption: Resolved.  Now little more constipated due to dietary changes    3. Elevated transaminase level    4. Fatty liver    5. History of breast cancer    Plan  Update celiac serologies today  Check her CBC, iron stores  Repeat her CMP along with GGT  Check vitamin D levels  If elevated transaminase levels persist, will need full liver serologic work-up.  I also discussed that her liver will benefit significantly from limiting alcohol consumption  Recommended daily fiber supplementation with FiberCon to facilitate bowel movements.  MiraLAX if needed         Diagnoses and all orders for this visit:    1. Celiac disease (Primary)  -     Tissue Transglutaminase, IgA  -     Tissue Transglutaminase, IgG  -     IgA  -     Gliadin Antibody, IgG  -     Comprehensive Metabolic Panel  -     CBC & Differential  -     Vitamin D 25 Hydroxy  -     Iron Profile  -      Ferritin  -     Gamma GT    2. Diarrhea due to malabsorption    3. Elevated transaminase level  -     Tissue Transglutaminase, IgA  -     Tissue Transglutaminase, IgG  -     IgA  -     Gliadin Antibody, IgG  -     Comprehensive Metabolic Panel  -     CBC & Differential  -     Vitamin D 25 Hydroxy  -     Iron Profile  -     Ferritin  -     Gamma GT    4. Fatty liver  -     Tissue Transglutaminase, IgA  -     Tissue Transglutaminase, IgG  -     IgA  -     Gliadin Antibody, IgG  -     Comprehensive Metabolic Panel  -     CBC & Differential  -     Vitamin D 25 Hydroxy  -     Iron Profile  -     Ferritin  -     Gamma GT    5. History of breast cancer          I have discussed the above plan with the patient.  They verbalize understanding and are in agreement with the plan.  They have been advised to contact the office for any questions, concerns, or changes related to their health.    Dictated utilizing Dragon dictation

## 2022-05-11 LAB
25(OH)D3+25(OH)D2 SERPL-MCNC: 54.9 NG/ML (ref 30–100)
ALBUMIN SERPL-MCNC: 4.7 G/DL (ref 3.8–4.8)
ALBUMIN/GLOB SERPL: 1.8 {RATIO} (ref 1.2–2.2)
ALP SERPL-CCNC: 66 IU/L (ref 44–121)
ALT SERPL-CCNC: 15 IU/L (ref 0–32)
AST SERPL-CCNC: 21 IU/L (ref 0–40)
BASOPHILS # BLD AUTO: 0 X10E3/UL (ref 0–0.2)
BASOPHILS NFR BLD AUTO: 1 %
BILIRUB SERPL-MCNC: 0.3 MG/DL (ref 0–1.2)
BUN SERPL-MCNC: 16 MG/DL (ref 6–24)
BUN/CREAT SERPL: 24 (ref 9–23)
CALCIUM SERPL-MCNC: 9.5 MG/DL (ref 8.7–10.2)
CHLORIDE SERPL-SCNC: 103 MMOL/L (ref 96–106)
CO2 SERPL-SCNC: 21 MMOL/L (ref 20–29)
CREAT SERPL-MCNC: 0.68 MG/DL (ref 0.57–1)
EGFRCR SERPLBLD CKD-EPI 2021: 111 ML/MIN/1.73
EOSINOPHIL # BLD AUTO: 0.1 X10E3/UL (ref 0–0.4)
EOSINOPHIL NFR BLD AUTO: 2 %
ERYTHROCYTE [DISTWIDTH] IN BLOOD BY AUTOMATED COUNT: 12.4 % (ref 11.7–15.4)
FERRITIN SERPL-MCNC: 70 NG/ML (ref 15–150)
GGT SERPL-CCNC: 26 IU/L (ref 0–60)
GLIADIN PEPTIDE IGG SER-ACNC: 14 UNITS (ref 0–19)
GLOBULIN SER CALC-MCNC: 2.6 G/DL (ref 1.5–4.5)
GLUCOSE SERPL-MCNC: 89 MG/DL (ref 65–99)
HCT VFR BLD AUTO: 41.6 % (ref 34–46.6)
HGB BLD-MCNC: 14.3 G/DL (ref 11.1–15.9)
IGA SERPL-MCNC: 288 MG/DL (ref 87–352)
IMM GRANULOCYTES # BLD AUTO: 0 X10E3/UL (ref 0–0.1)
IMM GRANULOCYTES NFR BLD AUTO: 0 %
IRON SATN MFR SERPL: 19 % (ref 15–55)
IRON SERPL-MCNC: 65 UG/DL (ref 27–159)
LYMPHOCYTES # BLD AUTO: 1 X10E3/UL (ref 0.7–3.1)
LYMPHOCYTES NFR BLD AUTO: 15 %
MCH RBC QN AUTO: 32.8 PG (ref 26.6–33)
MCHC RBC AUTO-ENTMCNC: 34.4 G/DL (ref 31.5–35.7)
MCV RBC AUTO: 95 FL (ref 79–97)
MONOCYTES # BLD AUTO: 0.7 X10E3/UL (ref 0.1–0.9)
MONOCYTES NFR BLD AUTO: 10 %
NEUTROPHILS # BLD AUTO: 4.8 X10E3/UL (ref 1.4–7)
NEUTROPHILS NFR BLD AUTO: 72 %
PLATELET # BLD AUTO: 257 X10E3/UL (ref 150–450)
POTASSIUM SERPL-SCNC: 4.6 MMOL/L (ref 3.5–5.2)
PROT SERPL-MCNC: 7.3 G/DL (ref 6–8.5)
RBC # BLD AUTO: 4.36 X10E6/UL (ref 3.77–5.28)
SODIUM SERPL-SCNC: 140 MMOL/L (ref 134–144)
TIBC SERPL-MCNC: 341 UG/DL (ref 250–450)
TTG IGA SER-ACNC: 4 U/ML (ref 0–3)
TTG IGG SER-ACNC: 5 U/ML (ref 0–5)
UIBC SERPL-MCNC: 276 UG/DL (ref 131–425)
WBC # BLD AUTO: 6.6 X10E3/UL (ref 3.4–10.8)

## 2022-05-13 ENCOUNTER — TELEPHONE (OUTPATIENT)
Dept: GASTROENTEROLOGY | Facility: CLINIC | Age: 42
End: 2022-05-13

## 2022-05-13 NOTE — TELEPHONE ENCOUNTER
----- Message from Sonja Sales MD sent at 5/13/2022  1:00 PM EDT -----  Celiac antibodies are much improved, down to 4 (normal is less than 3)    Liver labs back into normal range    Keep up the good work with the gluten free diet!

## 2022-05-13 NOTE — PROGRESS NOTES
Celiac antibodies are much improved, down to 4 (normal is less than 3)    Liver labs back into normal range    Keep up the good work with the gluten free diet!

## 2022-07-25 ENCOUNTER — LAB (OUTPATIENT)
Dept: OTHER | Facility: HOSPITAL | Age: 42
End: 2022-07-25

## 2022-07-25 ENCOUNTER — OFFICE VISIT (OUTPATIENT)
Dept: ONCOLOGY | Facility: CLINIC | Age: 42
End: 2022-07-25

## 2022-07-25 ENCOUNTER — INFUSION (OUTPATIENT)
Dept: ONCOLOGY | Facility: HOSPITAL | Age: 42
End: 2022-07-25

## 2022-07-25 VITALS
DIASTOLIC BLOOD PRESSURE: 76 MMHG | TEMPERATURE: 97.3 F | HEART RATE: 71 BPM | OXYGEN SATURATION: 100 % | WEIGHT: 122.7 LBS | BODY MASS INDEX: 24.09 KG/M2 | RESPIRATION RATE: 18 BRPM | SYSTOLIC BLOOD PRESSURE: 107 MMHG | HEIGHT: 60 IN

## 2022-07-25 DIAGNOSIS — Z15.09 BRCA1 GENETIC CARRIER: Primary | ICD-10-CM

## 2022-07-25 DIAGNOSIS — Z15.01 BRCA1 GENETIC CARRIER: ICD-10-CM

## 2022-07-25 DIAGNOSIS — Z15.09 BRCA1 GENETIC CARRIER: ICD-10-CM

## 2022-07-25 DIAGNOSIS — M81.8 OTHER OSTEOPOROSIS WITHOUT CURRENT PATHOLOGICAL FRACTURE: ICD-10-CM

## 2022-07-25 DIAGNOSIS — C50.011 BILATERAL MALIGNANT NEOPLASM INVOLVING BOTH NIPPLE AND AREOLA IN FEMALE, UNSPECIFIED ESTROGEN RECEPTOR STATUS: ICD-10-CM

## 2022-07-25 DIAGNOSIS — Z15.01 BRCA1 GENETIC CARRIER: Primary | ICD-10-CM

## 2022-07-25 DIAGNOSIS — C50.012 BILATERAL MALIGNANT NEOPLASM INVOLVING BOTH NIPPLE AND AREOLA IN FEMALE, UNSPECIFIED ESTROGEN RECEPTOR STATUS: ICD-10-CM

## 2022-07-25 DIAGNOSIS — M81.8 OTHER OSTEOPOROSIS WITHOUT CURRENT PATHOLOGICAL FRACTURE: Primary | ICD-10-CM

## 2022-07-25 LAB
ALBUMIN SERPL-MCNC: 4.6 G/DL (ref 3.5–5.2)
ALBUMIN/GLOB SERPL: 1.7 G/DL
ALP SERPL-CCNC: 77 U/L (ref 39–117)
ALT SERPL W P-5'-P-CCNC: 22 U/L (ref 1–33)
ANION GAP SERPL CALCULATED.3IONS-SCNC: 9.9 MMOL/L (ref 5–15)
AST SERPL-CCNC: 27 U/L (ref 1–32)
BASOPHILS # BLD AUTO: 0.02 10*3/MM3 (ref 0–0.2)
BASOPHILS NFR BLD AUTO: 0.4 % (ref 0–1.5)
BILIRUB SERPL-MCNC: 0.3 MG/DL (ref 0–1.2)
BUN SERPL-MCNC: 15 MG/DL (ref 6–20)
BUN/CREAT SERPL: 27.8 (ref 7–25)
CALCIUM SPEC-SCNC: 9.6 MG/DL (ref 8.6–10.5)
CHLORIDE SERPL-SCNC: 104 MMOL/L (ref 98–107)
CO2 SERPL-SCNC: 27.1 MMOL/L (ref 22–29)
CREAT SERPL-MCNC: 0.54 MG/DL (ref 0.57–1)
DEPRECATED RDW RBC AUTO: 44.9 FL (ref 37–54)
EGFRCR SERPLBLD CKD-EPI 2021: 118.1 ML/MIN/1.73
EOSINOPHIL # BLD AUTO: 0.12 10*3/MM3 (ref 0–0.4)
EOSINOPHIL NFR BLD AUTO: 2.5 % (ref 0.3–6.2)
ERYTHROCYTE [DISTWIDTH] IN BLOOD BY AUTOMATED COUNT: 12.7 % (ref 12.3–15.4)
GLOBULIN UR ELPH-MCNC: 2.7 GM/DL
GLUCOSE SERPL-MCNC: 99 MG/DL (ref 65–99)
HCT VFR BLD AUTO: 41.8 % (ref 34–46.6)
HGB BLD-MCNC: 14.2 G/DL (ref 12–15.9)
IMM GRANULOCYTES # BLD AUTO: 0.01 10*3/MM3 (ref 0–0.05)
IMM GRANULOCYTES NFR BLD AUTO: 0.2 % (ref 0–0.5)
LYMPHOCYTES # BLD AUTO: 1.01 10*3/MM3 (ref 0.7–3.1)
LYMPHOCYTES NFR BLD AUTO: 21.1 % (ref 19.6–45.3)
MAGNESIUM SERPL-MCNC: 2.1 MG/DL (ref 1.6–2.6)
MCH RBC QN AUTO: 33.3 PG (ref 26.6–33)
MCHC RBC AUTO-ENTMCNC: 34 G/DL (ref 31.5–35.7)
MCV RBC AUTO: 97.9 FL (ref 79–97)
MONOCYTES # BLD AUTO: 0.39 10*3/MM3 (ref 0.1–0.9)
MONOCYTES NFR BLD AUTO: 8.1 % (ref 5–12)
NEUTROPHILS NFR BLD AUTO: 3.24 10*3/MM3 (ref 1.7–7)
NEUTROPHILS NFR BLD AUTO: 67.7 % (ref 42.7–76)
NRBC BLD AUTO-RTO: 0 /100 WBC (ref 0–0.2)
PHOSPHATE SERPL-MCNC: 4 MG/DL (ref 2.5–4.5)
PLATELET # BLD AUTO: 250 10*3/MM3 (ref 140–450)
PMV BLD AUTO: 9.5 FL (ref 6–12)
POTASSIUM SERPL-SCNC: 5.2 MMOL/L (ref 3.5–5.2)
PROT SERPL-MCNC: 7.3 G/DL (ref 6–8.5)
RBC # BLD AUTO: 4.27 10*6/MM3 (ref 3.77–5.28)
SODIUM SERPL-SCNC: 141 MMOL/L (ref 136–145)
WBC NRBC COR # BLD: 4.79 10*3/MM3 (ref 3.4–10.8)

## 2022-07-25 PROCEDURE — 85025 COMPLETE CBC W/AUTO DIFF WBC: CPT | Performed by: INTERNAL MEDICINE

## 2022-07-25 PROCEDURE — 25010000002 DENOSUMAB 60 MG/ML SOLUTION PREFILLED SYRINGE: Performed by: INTERNAL MEDICINE

## 2022-07-25 PROCEDURE — 36415 COLL VENOUS BLD VENIPUNCTURE: CPT

## 2022-07-25 PROCEDURE — 99214 OFFICE O/P EST MOD 30 MIN: CPT | Performed by: INTERNAL MEDICINE

## 2022-07-25 PROCEDURE — 84100 ASSAY OF PHOSPHORUS: CPT | Performed by: INTERNAL MEDICINE

## 2022-07-25 PROCEDURE — 83735 ASSAY OF MAGNESIUM: CPT | Performed by: INTERNAL MEDICINE

## 2022-07-25 PROCEDURE — 96372 THER/PROPH/DIAG INJ SC/IM: CPT

## 2022-07-25 PROCEDURE — 80053 COMPREHEN METABOLIC PANEL: CPT | Performed by: INTERNAL MEDICINE

## 2022-07-25 RX ADMIN — DENOSUMAB 60 MG: 60 INJECTION SUBCUTANEOUS at 11:47

## 2022-07-25 NOTE — PROGRESS NOTES
Subjective      EASONS FOR FOLLOWUP:   1. [id number] triple negative right breast cancer.   2. T1Nx left breast cancer invasive ductal carcinoma, ER/[place] positive, HER2 negative.   3. BRCA 1 positive.   4. Right internal mammary node positive on CT scan.   5. Neoadjuvant dose dense Adriamycin/Cytoxan, weekly Taxol started on 01/20/2014 with plan for axillary dissection on the right and sentinel node on left, bilateral mastectomies plus radiation to the right breast and internal mammary nodes.   6. Addition of carboplatin with weekly Taxol to start on 03/24/2014.   7. The patient was seen on 08/05/2014, postoperatively; bilateral mastectomies and sentinel nodes with a 5 mm residua l grade 3 tumor in the right breast with two negative sentinel nodes (cannot tell if the node with a clip was removed). Complete response in the left breast with two negative sentinel nodes. The decision was made to consider radiation because of her pret reatment internal mammary node on the right and tamoxifen therapy for the hormone positive breast cancer on right. Clinical trials are being looked into.   8. Additional surgery to remove the lymph node with the clip in the right axilla, 3 nodes removed and negative for metastatic disease. Tamoxifen started September 2014.   9. Radiation completed in October 2014. Tamoxifen on hold because of some neurological signs.   10. Not eligible for NSABP B-55 because of tamoxifen as of 10/20/2014.   11. Tamoxifen resumed on 11/18/2014 on every other day dose.   12. CT scans done for right neck discomfort with negative scans except for radiation scarring in the right lung apex.   13. Post total abdominal hysterectomy and BSO in 01/2015. Tamoxifen resumed with switch to an AI in 7/15  14. Lymphedema, right arm.                   16.  Iron deficiency anemia due to celiac disease    History of Present Illness  patient is a 42-year-old white female with BRCA1 positivity and bilateral breast cancers  currently off antihormonal therapy after 7 years of treatment (on Arimidex for the last 5 years and  2year of tamoxifen )      She has been found to have celiac disease which explains all her GI symptoms and since going on a gluten-free diet GI symptoms are significantly improved-      She is off her Effexor and feels better overall but she continues to have some discomfort in the left lower quadrant and since her colonoscopy was totally benign - CAT scan to evaluate this was negative and her symptoms improved with some fiber and constipation measures.     Her bone density shows worsening osteoporosis and I think we can stop her Boniva and switch to Prolia    She is here for her second dose of Prolia    Active Ambulatory Problems     Diagnosis Date Noted   • Malignant neoplasm of female breast (HCC) 04/15/2016   • BRCA1 genetic carrier 04/15/2016   • Anemia 08/04/2016   • Long term current use of aromatase inhibitor 08/04/2016   • Osteopenia 08/04/2016   • Diarrhea of presumed infectious origin 08/06/2019   • Elevated transaminase level 08/07/2019   • Fatty liver 08/19/2019   • Diarrhea due to malabsorption 09/12/2019   • Lymphocytic colitis 09/12/2019   • Iron adverse reaction 05/10/2021   • Diarrhea 06/23/2021   • Low ferritin level 06/23/2021   • Celiac disease 11/10/2021   • Other osteoporosis without current pathological fracture 12/06/2021     Resolved Ambulatory Problems     Diagnosis Date Noted   • No Resolved Ambulatory Problems     Past Medical History:   Diagnosis Date   • Cancer (HCC)    • Heart burn    • History of radiation therapy    • Lymphedema    • Slow to wake up after anesthesia      Past Surgical History:   Procedure Laterality Date   • BREAST AUGMENTATION Right 11/5/2019    Procedure: RIGHT CAPSULOTOMY;  Surgeon: MADELYN Arreola MD;  Location: [facility]  Service: Plastics   • BREAST IMPLANT SURGERY Left    • BREAST SURGERY Bilateral 2014    Mastectomy   • BREAST TISSUE EXPANDER REMOVAL  INSERTION OF IMPLANT     •  SECTION  2007, 2010    X2   • COLONOSCOPY N/A 2019    Hypertrophied anal papilla, NBIH   • COLONOSCOPY N/A 2021    Procedure: COLONOSCOPY TO CECUM/TI WITH POLYPECTOMY ( HOT SNARE) AND BIOPSY;SALINELIFT; RESOLUTION CLIP X2;  Surgeon: Sonja Sales MD;  Location: [facility] ENDOSCOPY;  Service: Gastroenterology;  Laterality: N/A;  microscopic colitis; diarrhea; iron def anemia  POST: COLON POLYP;  HEMORRHOIDS   • ENDOSCOPY N/A 2021    Procedure: ESOPHAGOGASTRODUODENOSCOPY WITH BIOPSY;  Surgeon: Sonja Sales MD;  Location: [facility] ENDOSCOPY;  Service: Gastroenterology;  Laterality: N/A;  microscopic colitis; diarrhea; iron def anemia  POST:DUODENITIS  R/O CELIAC DISEASE; GASTRITIS   • FAT GRAFTING Right 2019    Procedure: RIGHT FAT GRAFTING WITH REVOLVE;  Surgeon: MADELYN Arreola MD;  Location: [facility] OR;  Service: Plastics   • HYSTERECTOMY      Total   • MASTECTOMY           OB/GYN HISTORY: She is  2, para 2. Menarche at age 10. First childbirth wa s at age 26. She breast-fed her second child for 6 weeks. She was on birth control until her first childbirth and then used a Mirena IUD which she just had removed 3 months ago.     FAMILY HISTORY:  Parents are in their late 50s and are healthy. She has no siblings. She has a paternal aunt with breast cancer in 40s, maternal grandmother with breast cancer at age 58, and both of the grandmother'  s sisters had breast cancer in their 50s. She has a maternal uncle with prostate cancer at age 54. There is no ovarian, uterine or pancreatic cancer in the family that she is aware of.      HEMATOLOGIC/ONCOLOGIC HISTORY:   The patient was seen on 2014, having had bilateral mastectomies. Right breast had a 5 mm residual invasive tumor, grade 3, with clear margins, two negative sentinel nodes (I cannot tell if the lymph node with the clip was remove d and we will have to assess this). Left breast shows a  complete pathological response with two negative sentinel nodes. The decision was made to review her for radiation therapy because of enlarged right internal mammary node, pretreatment, and to star t tamoxifen. She is going to have her ovaries removed later in the year when she has expanders put in and we can switch to an AI at that point. I am looking into clinical trials for BCRA positive patients with residual disease and for triple negative canc ers with residual disease but the 5 mm tumor may disqualify her.        The patient was seen on 09/02/2014 with additional surgery to remove the lymph node in her axilla with the clip in it which was thankfully benign in addition to 3 other nodes which were nega tive. Radiation to the internal mammary chain planned plus tamoxifen for 5 years. The NSABP B55 study is expected to have an amendment including hormone positive patients and we will revisit this issue after her radiation. Her port has been removed.        NSA BP amendment has not yet gone through to include patients on tamoxifen, therefore, she is not eligible for NSABP B-55 as of 10/21/2014. Tamoxifen held for 1 month because of some dizzy spells and hysterectomy and oophorectomy planned for early 2015, at wh ich point we will switch to Arimidex.        The patient was seen on 02/25/2014 having CT scans because a preop chest x-ray before hysterectomy showed a shadow in the right apex. CT scans showed what seemed to be radiation change in the apex of the right lung. No abnormality in the neck or abdomen and a residual small seroma in the right axilla. Path reports on her hysterectomy and BSO were benign and because of her intolerance of tamoxifen, we started her on Effexor 12.5 b.i.d. to be increased as tolerated and t hen to resume tamoxifen every other day for a month and then go to daily if possible. Once she is stable at this, we will try and switch over to an AI if she can tolerate this.        The patient  was seen on 04/21/2015 with a CT scan done to followup on changes noted on a previous CT scans, which were felt to be radiation-related. Thankfully, the scan shows diminished consolidation of the pleural surface and a small subpleural air space disease, stable at 8 x 4 mm and images through the upper abdomen and the re s t of the lung were negative. She is up to 37.5 of Effexor with good control of her hot flashes and taking her tamoxifen every other day and we have asked her to increase her tamoxifen to daily and we will see her back in 3 months and if she is tolerating this well, consider switching to an AI at that time.        Patient seen on 07/21/2015, tolerating tamoxifen well at full dose for the last 3 months since her hysterectomy. Plans to switch to Femara in 2-3 months. Nocturnal cough, which I suspect is allergic or reflux related. Consider a steroid inhaler.        7/17 Arimidex since July 2015.  Overall she is tolerating Arimidex well with very few hot flashes because of the Effexor but she is clearly not herself due to the postmenopausal state and has less energy low libido and I think some amount of depression which is not unusual in this situation.  Her mother had osteoporosis and her baseline bone density is osteopenia which is fairly severe and I added Boniva monthly in addition to calcium and vitamin D    .Her neck pain is resolved she is sleeping okay appetite is fair and apart from fatigue which is related to her postmenopausal state she appears to be doing okay.  She is a little more irritable than usual but realizes this is part of the postmenopausal state  Complains of some irritable bowel type symptoms with constipation alternating with diarrhea and she is a little more anemic than usual and her ferritin was low and she was given oral iron with good improvement in her hemoglobin.  I'm reluctant to add another test with colonoscopy for at this point and we will wait and see how she does off  iron and if her hemoglobin drops and her iron is low again the colonoscopy and EGD will be scheduled .  Her lymphedema is a little better this summer    She continues to have discomfort over the right chest wall where she received radiation and does not feel that the implant is his flexible and stiffer and because of the radiation fibrosis on CAT scan has been concerned about issues here.    Her liver function tests have been barely up on and off and therefore we will do another ultrasound to evaluate the liver and gallbladder.  2/19  she continues to have some tenderness in the rib cage below the right implant and is worried about this.  We did a bone scan in November which was benign but her bone density shows osteoporosis and she has been on Boniva more regularly now but I still want to repeat the bone density in November and add Prolia if it is not improved  8/19  CAT scan of the chest abdomen pelvis showed no evidence of recurrent cancer but a fatty liver was noted  I told her to follow-up with the gastroenterologist to ask how best to deal with her fatty liver and also how long to stay on the steroids as she has no follow-up appointment with them-lymphocytic colitis diagnosed and treated with steroids?  Related to Arimidex    10/19  Switch to tamoxifen again because bone density shows osteoporosis in her spine and both hips patient declined Prolia will add Boniva    5/21  She is doing well overall but had a recurrence of her lymphocytic colitis and Initially we thought maybe Arimidex was causing this colitis so we switch her to tamoxifen she still had a recurrence therefore I do not think this is drug related-she has not been taking the Boniva so this is not the cause but she is on Effexor and apparently this may cause this so I think it is time to get her off the Effexor which she can only do if she comes off the tamoxifen.  She does complain of some left lower quadrant discomfort on and off and I think she  needs repeat colonoscopy because her iron stores are low and she is iron deficient    At this point she has been on hormonal blockade for almost 7 years for small tumor which was completely gone at the time of surgery after neoadjuvant chemotherapy and I think this is adequate treatment although she is very scared to stop the tamoxifen      She has had worsening fatigue and I think this is due to the iron deficiency.   she always has a little discomfort below the right implant which is stable    She is very concerned because of significant weight gain of 20 pounds in the last year some of which is from prednisone and some of which is due to the coronavirus pandemic and lack of exercise and some because of her postmenopausal state we discussed strategies for diet and exercise but hopefully after she stops her tamoxifen weans off the Effexor and this will improve    Ca1 25 is negative -we did an ultrasound of the pancreas last year because insurance would not approve her MRI because of her age    We discussed that this is a good time point and she has had no recurrence of her triple negative breast cancer that had residual disease after neoadjuvant chemo at 5 years.  For left breast cancer with which is hormone positive she had a path CR but we have opted to continue hormonal blockade for total of 7 years  which would be up in September but because of her GI complaints and weight gain I think it is reasonable to stop      SOCIAL HISTORY: She is , lives with her . She is school psychologist. She does not smoke. She drinks very minimally. No trouble with drug addiction.    FAMILY HISTORY: Parents are in their late 50s and are healthy. She has no siblings. She has a paternal aunt with breast cancer in 40s, maternal grandmother with breast cancer at age 58,   and both of the grandmother' s sisters had breast cancer in their 50s. She has a maternal uncle with prostate cancer at age 54. There is no ovarian,  "uterine or pancreatic cancer in the family that she is aware of. BRCA 1 positive    Review of Systems   Constitutional: Positive for unexpected weight change (Weight gain). Negative for fatigue (Worse).   HENT: Negative.    Cardiovascular: Negative.    Gastrointestinal: Positive for abdominal pain (Left lower quadrant discomfort on and off). Negative for diarrhea (stable ).   Endocrine: Positive for heat intolerance (same ).   Genitourinary: Negative.    Neurological: Negative.    Psychiatric/Behavioral: Positive for decreased concentration (same ), dysphoric mood (same ) and sleep disturbance (little better ). The patient is nervous/anxious (better ).    All other systems reviewed and are negative.     A comprehensive 14 point review of systems was performed and was negative except as mentioned.    Medications:  The current medication list was reviewed in the EMR    ALLERGIES:    No Known Allergies    Objective      Vitals:    07/25/22 1021   BP: 107/76   Pulse: 71   Resp: 18   Temp: 97.3 °F (36.3 °C)   TempSrc: Temporal   SpO2: 100%   Weight: 55.7 kg (122 lb 11.2 oz)   Height: 152.4 cm (60\")   PainSc: 0-No pain     Video visit no vitals  Current Status 7/25/2022   ECOG score 0       Physical Exam   Pulmonary/Chest:           GENERAL:  Well-developed, well-nourished in no acute distress.   SKIN:  Warm, dry without rashes, purpura or petechiae.  HEAD:  Normocephalic.  EYES:  Pupils equal, round and reactive to light.  EOMs intact.  Conjunctivae normal.  EARS:  Hearing intact.  NOSE:  Septum midline.  No excoriations or nasal discharge.  MOUTH:  Tongue is well-papillated; no stomatitis or ulcers.  Lips normal.  THROAT:  Oropharynx without lesions or exudates.  NECK:  Supple with good range of motion; no thyromegaly or masses, no JVD.  LYMPHATICS:  No cervical, supraclavicular, axillary or inguinal adenopathy.  CHEST:  Lungs clear to percussion and auscultation. Good airflow.  BREASTS: Bilateral implants benign there " is no axillary adenopathy.Tenderness in the subcostal and intramammary rib cage right greater than left -point tenderness in the costochondral junction below the right breast-stable   CARDIAC:  Regular rate and rhythm without murmurs, rubs or gallops. Normal S1,S2.  ABDOMEN:  Soft, nontender with no organomegaly or masses.  No mass in the left lower quadrant  EXTREMITIES:  No clubbing, cyanosis or edema.  NEUROLOGICAL:  Cranial Nerves II-XII grossly intact.  No focal neurological deficits.  PSYCHIATRIC:  Normal affect and mood.     I have reexamined the patient and the results are consistent with the previously documented exam. Shiv Moreno MD       RECENT LABS:   WBC   Date Value Ref Range Status   07/25/2022 4.79 3.40 - 10.80 10*3/mm3 Final   05/10/2022 6.6 3.4 - 10.8 x10E3/uL Final     RBC   Date Value Ref Range Status   07/25/2022 4.27 3.77 - 5.28 10*6/mm3 Final   05/10/2022 4.36 3.77 - 5.28 x10E6/uL Final     Hemoglobin   Date Value Ref Range Status   07/25/2022 14.2 12.0 - 15.9 g/dL Final     Hematocrit   Date Value Ref Range Status   07/25/2022 41.8 34.0 - 46.6 % Final     Platelets   Date Value Ref Range Status   07/25/2022 250 140 - 450 10*3/mm3 Final      FINDINGS: Sonographic evaluation of the liver and selective structures  of the right upper quadrant was performed. The right kidney has a normal  appearance. No abnormality of the liver is appreciated. The liver  measures on the order of 17.5 cm in anterior to posterior dimension. The  gallbladder has a normal appearance. The common bile duct measures 0.4  cm in diameter. Per the sonographer, the patient was nontender in the  right upper quadrant during the examination. Visualized portion of the  pancreas appears normal.      IMPRESSION:  Negative liver sonogram.      This report was finalized on 4/14/2017       NUCLEAR MEDICINE WHOLE BODY BONE SCAN     IMPRESSION:  No scintigraphic evidence for bony metastatic disease.     This report was  finalized on 11/17/2018     CT CHEST, ABDOMEN, AND PELVIS WITH IV CONTRAST  IMPRESSION:  1. There is no convincing evidence for metastatic disease.  2. Paucity of formed stool within the colon. There is no colonic  thickening to suggest colitis.  3. Hepatic steatosis.         Assessment & Plan    1.[id number] triple negative right breast cancer. [id number] dose dense Adriamycin Cytoxan and weekly carbotaxol-on tamoxifen-switch to Arimidex after oophorectomy in July 2015    2.T1Nx left breast cancer invasive ductal carcinoma, ER/[place] positive, HER2 negative. [id number]  · 5 years of Arimidex +2 years of tamoxifen given adjuvant  · 3. BRCA 1 positive. Post hysterectomy and oophorectomy                   4.  Lymphedema right arm improved with a sleeve                  5.  Mild depression declines any treatment for this now                  6.  Mild anemia due to iron deficiency responding to oral iron                  7.  Osteopenia moderately severe-declined Prolia -Boniva  was started   Osteoporosis documented in 11/21 Prolia to start Boniva to stop                  8.  Mildly elevated LFTs with negative liver and pancreas ultrasound -                     CAT scan consistent with fatty liver                   9.  Exacerbation of hemorrhoidal bleeding due to diarrhea                           10.  Lymphocytic colitis on budesonide  Celiac disease diagnosed by biopsy and serology in 10/21    Plan  1  Check Ca1 25 annually  2..  MRCP will be delayed till age 45  3. Prolia for osteoporosis today  3. return in 6 months for follow-up.  For Prolia with bone density              7/25/2022      CC:

## 2022-07-25 NOTE — NURSING NOTE
Seen today per Dr. Moreno then to injection chair for prolia. This was her 2nd dose and states that she had no concerns after the first dose. Denies recent dental work. Lab results reviewed and wnl for Prolia.Prolia was administered in the left arm without incident; bandaid to site. She has her next prolia injection scheduled. Discharged stable.

## 2022-11-10 ENCOUNTER — OFFICE VISIT (OUTPATIENT)
Dept: GASTROENTEROLOGY | Facility: CLINIC | Age: 42
End: 2022-11-10

## 2022-11-10 VITALS
SYSTOLIC BLOOD PRESSURE: 125 MMHG | BODY MASS INDEX: 25.11 KG/M2 | DIASTOLIC BLOOD PRESSURE: 83 MMHG | WEIGHT: 127.9 LBS | TEMPERATURE: 97.1 F | HEART RATE: 73 BPM | HEIGHT: 60 IN

## 2022-11-10 DIAGNOSIS — K90.0 CELIAC DISEASE: Primary | Chronic | ICD-10-CM

## 2022-11-10 DIAGNOSIS — R10.32 LLQ PAIN: Chronic | ICD-10-CM

## 2022-11-10 DIAGNOSIS — K59.09 OTHER CONSTIPATION: Chronic | ICD-10-CM

## 2022-11-10 PROCEDURE — 99214 OFFICE O/P EST MOD 30 MIN: CPT | Performed by: INTERNAL MEDICINE

## 2022-11-10 NOTE — PROGRESS NOTES
Chief Complaint   Patient presents with   • Celiac Disease   • Diarrhea       Subjective     HPI    Sravani Sky is a 42 y.o. female with a past medical history noted below who presents for follow-up of history of diarrhea, history of iron deficiency anemia, celiac disease, and elevated liver enzymes and imaging showing fatty liver    She is doing well.  She is adherent to a gluten free diet, maybe small amounts here and there but otherwise very strict.  With this, no more diarrhea. In fact, she is now constipated.  She is taking 2 fiber tabs daily and does well with this.      May 2022  labs showed improvement in her celiac antibodies.    Still with LLQ discomfort, feels pressure, like something is there, not painful per se.  She has had a total hysterectomy for BRCA 1 gene.  Imaging from December of last year showed no abnormality in this area.    Otherwise feeling pretty good.  No heartburn, reflux, nausea, vomiting, rash.    Today's visit was in the office.  Both the patient and I were wearing face masks and proper hand hygiene was performed before and after the physical exam.           Current Outpatient Medications:   •  Calcium Citrate-Vitamin D (CALCIUM + D PO), Take  by mouth Every Morning., Disp: , Rfl:   •  MULTIPLE VITAMINS PO, Take 1 tablet by mouth Every Morning., Disp: , Rfl:       Objective     Vitals:    11/10/22 0833   BP: 125/83   Pulse: 73   Temp: 97.1 °F (36.2 °C)         11/10/22  0833   Weight: 58 kg (127 lb 14.4 oz)     Body mass index is 24.98 kg/m².    Physical Exam        WBC   Date Value Ref Range Status   07/25/2022 4.79 3.40 - 10.80 10*3/mm3 Final   05/10/2022 6.6 3.4 - 10.8 x10E3/uL Final     RBC   Date Value Ref Range Status   07/25/2022 4.27 3.77 - 5.28 10*6/mm3 Final   05/10/2022 4.36 3.77 - 5.28 x10E6/uL Final     Hemoglobin   Date Value Ref Range Status   07/25/2022 14.2 12.0 - 15.9 g/dL Final     Hematocrit   Date Value Ref Range Status   07/25/2022 41.8 34.0 - 46.6 % Final      MCV   Date Value Ref Range Status   07/25/2022 97.9 (H) 79.0 - 97.0 fL Final     MCH   Date Value Ref Range Status   07/25/2022 33.3 (H) 26.6 - 33.0 pg Final     MCHC   Date Value Ref Range Status   07/25/2022 34.0 31.5 - 35.7 g/dL Final     RDW   Date Value Ref Range Status   07/25/2022 12.7 12.3 - 15.4 % Final     RDW-SD   Date Value Ref Range Status   07/25/2022 44.9 37.0 - 54.0 fl Final     MPV   Date Value Ref Range Status   07/25/2022 9.5 6.0 - 12.0 fL Final     Platelets   Date Value Ref Range Status   07/25/2022 250 140 - 450 10*3/mm3 Final     Neutrophil %   Date Value Ref Range Status   07/25/2022 67.7 42.7 - 76.0 % Final     Lymphocyte %   Date Value Ref Range Status   07/25/2022 21.1 19.6 - 45.3 % Final     Monocyte %   Date Value Ref Range Status   07/25/2022 8.1 5.0 - 12.0 % Final     Eosinophil %   Date Value Ref Range Status   07/25/2022 2.5 0.3 - 6.2 % Final     Basophil %   Date Value Ref Range Status   07/25/2022 0.4 0.0 - 1.5 % Final     Immature Grans %   Date Value Ref Range Status   07/25/2022 0.2 0.0 - 0.5 % Final     Neutrophils, Absolute   Date Value Ref Range Status   07/25/2022 3.24 1.70 - 7.00 10*3/mm3 Final     Lymphocytes, Absolute   Date Value Ref Range Status   07/25/2022 1.01 0.70 - 3.10 10*3/mm3 Final     Monocytes, Absolute   Date Value Ref Range Status   07/25/2022 0.39 0.10 - 0.90 10*3/mm3 Final     Eosinophils, Absolute   Date Value Ref Range Status   07/25/2022 0.12 0.00 - 0.40 10*3/mm3 Final     Basophils, Absolute   Date Value Ref Range Status   07/25/2022 0.02 0.00 - 0.20 10*3/mm3 Final     Immature Grans, Absolute   Date Value Ref Range Status   07/25/2022 0.01 0.00 - 0.05 10*3/mm3 Final     nRBC   Date Value Ref Range Status   07/25/2022 0.0 0.0 - 0.2 /100 WBC Final       Lab Results   Component Value Date    GLUCOSE 99 07/25/2022    BUN 15 07/25/2022    CREATININE 0.54 (L) 07/25/2022    EGFRIFNONA 127 12/15/2021    EGFRIFAFRI 130 11/10/2021    BCR 27.8 (H) 07/25/2022     CO2 27.1 07/25/2022    CALCIUM 9.6 07/25/2022    PROTENTOTREF 7.3 05/10/2022    ALBUMIN 4.60 07/25/2022    LABIL2 1.8 05/10/2022    AST 27 07/25/2022    ALT 22 07/25/2022         Imaging Results (Last 7 Days)     ** No results found for the last 168 hours. **          I personally reviewed data as detailed below:     The labs listed above.    The radiology studies listed above.    Office notes from:7/25/22 hematology note    Endoscopy procedures and pathology from:    Assessment and Plan  1.  Celiac disease: She has done well on a gluten-free diet.  This was the  of her diarrhea, anemia and elevated liver enzymes, all which have improved with gluten-free diet.    2.  Constipation: Chronic issue, better with fiber    3. Left lower quadrant pain: Imaging from December of last year was pretty benign.  I think this is related to above    Plan  Update celiac labs today along with CMP  Continue fiber  Add in one half dose MiraLAX daily, increase or decrease as needed  KUB to assess stool burden  If persistent left lower quadrant issues, then we could update the CT imaging  Continue gluten-free diet         Diagnoses and all orders for this visit:    1. Celiac disease (Primary)  -     Tissue Transglutaminase, IgA  -     Tissue Transglutaminase, IgG  -     Gliadin Antibody, IgG  -     Comprehensive Metabolic Panel  -     XR Abdomen KUB; Future    2. Other constipation  -     Tissue Transglutaminase, IgA  -     Tissue Transglutaminase, IgG  -     Gliadin Antibody, IgG  -     Comprehensive Metabolic Panel  -     XR Abdomen KUB; Future    3. LLQ pain  -     XR Abdomen KUB; Future          I have discussed the above plan with the patient.  They verbalize understanding and are in agreement with the plan.  They have been advised to contact the office for any questions, concerns, or changes related to their health.    Dictated utilizing Dragon dictation

## 2022-11-11 LAB
ALBUMIN SERPL-MCNC: 4.8 G/DL (ref 3.5–5.2)
ALBUMIN/GLOB SERPL: 1.9 G/DL
ALP SERPL-CCNC: 66 U/L (ref 39–117)
ALT SERPL-CCNC: 20 U/L (ref 1–33)
AST SERPL-CCNC: 24 U/L (ref 1–32)
BILIRUB SERPL-MCNC: 0.4 MG/DL (ref 0–1.2)
BUN SERPL-MCNC: 15 MG/DL (ref 6–20)
BUN/CREAT SERPL: 30 (ref 7–25)
CALCIUM SERPL-MCNC: 9.7 MG/DL (ref 8.6–10.5)
CHLORIDE SERPL-SCNC: 100 MMOL/L (ref 98–107)
CO2 SERPL-SCNC: 28.1 MMOL/L (ref 22–29)
CREAT SERPL-MCNC: 0.5 MG/DL (ref 0.57–1)
EGFRCR SERPLBLD CKD-EPI 2021: 120.3 ML/MIN/1.73
GLIADIN PEPTIDE IGG SER-ACNC: 10 UNITS (ref 0–19)
GLOBULIN SER CALC-MCNC: 2.5 GM/DL
GLUCOSE SERPL-MCNC: 87 MG/DL (ref 65–99)
POTASSIUM SERPL-SCNC: 4.7 MMOL/L (ref 3.5–5.2)
PROT SERPL-MCNC: 7.3 G/DL (ref 6–8.5)
SODIUM SERPL-SCNC: 142 MMOL/L (ref 136–145)
TTG IGA SER-ACNC: 4 U/ML (ref 0–3)
TTG IGG SER-ACNC: 5 U/ML (ref 0–5)

## 2022-11-11 NOTE — PROGRESS NOTES
Her tissue transglutaminase IgA is still slightly elevated at 4 (1 point over normal), this is the same as 6 months ago.  Other labs are either stable or lower and in normal range.    Continue gluten-free diet.

## 2022-11-14 ENCOUNTER — TELEPHONE (OUTPATIENT)
Dept: GASTROENTEROLOGY | Facility: CLINIC | Age: 42
End: 2022-11-14

## 2022-11-14 NOTE — TELEPHONE ENCOUNTER
Her tissue transglutaminase IgA is still slightly elevated at 4 (1 point over normal), this is the same as 6 months ago.  Other labs are either stable or lower and in normal range.     Continue gluten-free diet.   Written by Sonja Sales MD on 11/11/2022  4:31 PM EST  Seen by patient Sravani AAYUSH Sky on 11/11/2022  9:00 PM    
1-2 drinks

## 2022-12-02 ENCOUNTER — HOSPITAL ENCOUNTER (OUTPATIENT)
Dept: BONE DENSITY | Facility: HOSPITAL | Age: 42
Discharge: HOME | End: 2022-12-02
Admitting: INTERNAL MEDICINE

## 2022-12-02 DIAGNOSIS — Z15.09 BRCA1 GENETIC CARRIER: ICD-10-CM

## 2022-12-02 DIAGNOSIS — C50.011 BILATERAL MALIGNANT NEOPLASM INVOLVING BOTH NIPPLE AND AREOLA IN FEMALE, UNSPECIFIED ESTROGEN RECEPTOR STATUS: ICD-10-CM

## 2022-12-02 DIAGNOSIS — Z15.01 BRCA1 GENETIC CARRIER: ICD-10-CM

## 2022-12-02 DIAGNOSIS — M81.8 OTHER OSTEOPOROSIS WITHOUT CURRENT PATHOLOGICAL FRACTURE: ICD-10-CM

## 2022-12-02 DIAGNOSIS — C50.012 BILATERAL MALIGNANT NEOPLASM INVOLVING BOTH NIPPLE AND AREOLA IN FEMALE, UNSPECIFIED ESTROGEN RECEPTOR STATUS: ICD-10-CM

## 2022-12-02 PROCEDURE — 77080 DXA BONE DENSITY AXIAL: CPT

## 2023-01-30 ENCOUNTER — INFUSION (OUTPATIENT)
Dept: ONCOLOGY | Facility: HOSPITAL | Age: 43
End: 2023-01-30
Payer: COMMERCIAL

## 2023-01-30 ENCOUNTER — OFFICE VISIT (OUTPATIENT)
Dept: ONCOLOGY | Facility: CLINIC | Age: 43
End: 2023-01-30
Payer: COMMERCIAL

## 2023-01-30 ENCOUNTER — LAB (OUTPATIENT)
Dept: OTHER | Facility: HOSPITAL | Age: 43
End: 2023-01-30
Payer: COMMERCIAL

## 2023-01-30 VITALS
HEIGHT: 60 IN | SYSTOLIC BLOOD PRESSURE: 133 MMHG | BODY MASS INDEX: 26.82 KG/M2 | RESPIRATION RATE: 16 BRPM | DIASTOLIC BLOOD PRESSURE: 87 MMHG | TEMPERATURE: 97.1 F | HEART RATE: 77 BPM | WEIGHT: 136.6 LBS | OXYGEN SATURATION: 100 %

## 2023-01-30 DIAGNOSIS — M81.8 OTHER OSTEOPOROSIS WITHOUT CURRENT PATHOLOGICAL FRACTURE: ICD-10-CM

## 2023-01-30 DIAGNOSIS — Z15.09 BRCA1 GENETIC CARRIER: ICD-10-CM

## 2023-01-30 DIAGNOSIS — M81.8 OTHER OSTEOPOROSIS WITHOUT CURRENT PATHOLOGICAL FRACTURE: Primary | ICD-10-CM

## 2023-01-30 DIAGNOSIS — Z15.01 BRCA1 GENETIC CARRIER: ICD-10-CM

## 2023-01-30 LAB
ALBUMIN SERPL-MCNC: 4.8 G/DL (ref 3.5–5.2)
ALBUMIN/GLOB SERPL: 1.4 G/DL
ALP SERPL-CCNC: 102 U/L (ref 39–117)
ALT SERPL W P-5'-P-CCNC: 31 U/L (ref 1–33)
ANION GAP SERPL CALCULATED.3IONS-SCNC: 9.3 MMOL/L (ref 5–15)
AST SERPL-CCNC: 27 U/L (ref 1–32)
BASOPHILS # BLD AUTO: 0.04 10*3/MM3 (ref 0–0.2)
BASOPHILS NFR BLD AUTO: 0.7 % (ref 0–1.5)
BILIRUB SERPL-MCNC: 0.3 MG/DL (ref 0–1.2)
BUN SERPL-MCNC: 13 MG/DL (ref 6–20)
BUN/CREAT SERPL: 26 (ref 7–25)
CALCIUM SPEC-SCNC: 10.2 MG/DL (ref 8.6–10.5)
CANCER AG125 SERPL QL: 2.6 U/ML (ref 0–38.1)
CHLORIDE SERPL-SCNC: 103 MMOL/L (ref 98–107)
CO2 SERPL-SCNC: 29.7 MMOL/L (ref 22–29)
CREAT SERPL-MCNC: 0.5 MG/DL (ref 0.57–1)
DEPRECATED RDW RBC AUTO: 43.8 FL (ref 37–54)
EGFRCR SERPLBLD CKD-EPI 2021: 120.3 ML/MIN/1.73
EOSINOPHIL # BLD AUTO: 0.21 10*3/MM3 (ref 0–0.4)
EOSINOPHIL NFR BLD AUTO: 3.9 % (ref 0.3–6.2)
ERYTHROCYTE [DISTWIDTH] IN BLOOD BY AUTOMATED COUNT: 12.2 % (ref 12.3–15.4)
GLOBULIN UR ELPH-MCNC: 3.5 GM/DL
GLUCOSE SERPL-MCNC: 118 MG/DL (ref 65–99)
HCT VFR BLD AUTO: 42.4 % (ref 34–46.6)
HGB BLD-MCNC: 14 G/DL (ref 12–15.9)
IMM GRANULOCYTES # BLD AUTO: 0.05 10*3/MM3 (ref 0–0.05)
IMM GRANULOCYTES NFR BLD AUTO: 0.9 % (ref 0–0.5)
LYMPHOCYTES # BLD AUTO: 1.06 10*3/MM3 (ref 0.7–3.1)
LYMPHOCYTES NFR BLD AUTO: 19.7 % (ref 19.6–45.3)
MAGNESIUM SERPL-MCNC: 2.2 MG/DL (ref 1.6–2.6)
MCH RBC QN AUTO: 32.3 PG (ref 26.6–33)
MCHC RBC AUTO-ENTMCNC: 33 G/DL (ref 31.5–35.7)
MCV RBC AUTO: 97.9 FL (ref 79–97)
MONOCYTES # BLD AUTO: 0.32 10*3/MM3 (ref 0.1–0.9)
MONOCYTES NFR BLD AUTO: 5.9 % (ref 5–12)
NEUTROPHILS NFR BLD AUTO: 3.7 10*3/MM3 (ref 1.7–7)
NEUTROPHILS NFR BLD AUTO: 68.9 % (ref 42.7–76)
NRBC BLD AUTO-RTO: 0 /100 WBC (ref 0–0.2)
PHOSPHATE SERPL-MCNC: 4.3 MG/DL (ref 2.5–4.5)
PLATELET # BLD AUTO: 296 10*3/MM3 (ref 140–450)
PMV BLD AUTO: 9.2 FL (ref 6–12)
POTASSIUM SERPL-SCNC: 4.3 MMOL/L (ref 3.5–5.2)
PROT SERPL-MCNC: 8.3 G/DL (ref 6–8.5)
RBC # BLD AUTO: 4.33 10*6/MM3 (ref 3.77–5.28)
SODIUM SERPL-SCNC: 142 MMOL/L (ref 136–145)
WBC NRBC COR # BLD: 5.38 10*3/MM3 (ref 3.4–10.8)

## 2023-01-30 PROCEDURE — 85025 COMPLETE CBC W/AUTO DIFF WBC: CPT | Performed by: INTERNAL MEDICINE

## 2023-01-30 PROCEDURE — 86304 IMMUNOASSAY TUMOR CA 125: CPT | Performed by: INTERNAL MEDICINE

## 2023-01-30 PROCEDURE — 80053 COMPREHEN METABOLIC PANEL: CPT | Performed by: INTERNAL MEDICINE

## 2023-01-30 PROCEDURE — 36415 COLL VENOUS BLD VENIPUNCTURE: CPT

## 2023-01-30 PROCEDURE — 99214 OFFICE O/P EST MOD 30 MIN: CPT | Performed by: INTERNAL MEDICINE

## 2023-01-30 PROCEDURE — 25010000002 DENOSUMAB 60 MG/ML SOLUTION PREFILLED SYRINGE: Performed by: INTERNAL MEDICINE

## 2023-01-30 PROCEDURE — 83735 ASSAY OF MAGNESIUM: CPT | Performed by: INTERNAL MEDICINE

## 2023-01-30 PROCEDURE — 84100 ASSAY OF PHOSPHORUS: CPT | Performed by: INTERNAL MEDICINE

## 2023-01-30 PROCEDURE — 96372 THER/PROPH/DIAG INJ SC/IM: CPT

## 2023-01-30 RX ADMIN — DENOSUMAB 60 MG: 60 INJECTION SUBCUTANEOUS at 12:16

## 2023-01-30 NOTE — PROGRESS NOTES
Subjective      REASONS FOR FOLLOWUP:   1.  Bilateral breast cancer ER/AL positive left and triple negative on the right  2/BRCA1 positive          History of Present Illness  patient is a 42-year-old white female with BRCA1 positivity and bilateral breast cancers currently off antihormonal therapy after 7 years of treatment (on Arimidex for the last 5 years and  2year of tamoxifen ) 9 years from diagnosis      She has been found to have celiac disease which explains all her GI symptoms and since going on a gluten-free diet GI symptoms are significantly improved-      She is off her Effexor and feels better overall but she continues to have some discomfort in the left lower quadrant and since her colonoscopy was totally benign - CAT scan to evaluate this was negative and her symptoms improved with some fiber and constipation measures.     Her bone density shows mild improvement in the lumbar bone density and stable osteoporosis in the hips and we will continue Prolia for at least 6 doses    She is here for her fourth dose of Prolia  Pancreatic screening will start at age 45    Active Ambulatory Problems     Diagnosis Date Noted   • Malignant neoplasm of female breast (HCC) 04/15/2016   • BRCA1 genetic carrier 04/15/2016   • Anemia 08/04/2016   • Long term current use of aromatase inhibitor 08/04/2016   • Osteopenia 08/04/2016   • Diarrhea of presumed infectious origin 08/06/2019   • Elevated transaminase level 08/07/2019   • Fatty liver 08/19/2019   • Diarrhea due to malabsorption 09/12/2019   • Lymphocytic colitis 09/12/2019   • Iron adverse reaction 05/10/2021   • Diarrhea 06/23/2021   • Low ferritin level 06/23/2021   • Celiac disease 11/10/2021   • Other osteoporosis without current pathological fracture 12/06/2021     Resolved Ambulatory Problems     Diagnosis Date Noted   • No Resolved Ambulatory Problems     Past Medical History:   Diagnosis Date   • Cancer (HCC)    • Heart burn    • History of radiation  therapy    • Lymphedema    • Slow to wake up after anesthesia      Past Surgical History:   Procedure Laterality Date   • BREAST AUGMENTATION Right 2019    Procedure: RIGHT CAPSULOTOMY;  Surgeon: MADELYN Arreola MD;  Location: [facility] OR;  Service: Plastics   • BREAST IMPLANT SURGERY Left    • BREAST SURGERY Bilateral 2014    Mastectomy   • BREAST TISSUE EXPANDER REMOVAL INSERTION OF IMPLANT     •  SECTION  , 2010    X2   • COLONOSCOPY N/A 2019    Hypertrophied anal papilla, NBIH   • COLONOSCOPY N/A 2021    Procedure: COLONOSCOPY TO CECUM/TI WITH POLYPECTOMY ( HOT SNARE) AND BIOPSY;SALINELIFT; RESOLUTION CLIP X2;  Surgeon: Sonja Sales MD;  Location: [facility] ENDOSCOPY;  Service: Gastroenterology;  Laterality: N/A;  microscopic colitis; diarrhea; iron def anemia  POST: COLON POLYP;  HEMORRHOIDS   • ENDOSCOPY N/A 2021    Procedure: ESOPHAGOGASTRODUODENOSCOPY WITH BIOPSY;  Surgeon: Sonja Sales MD;  Location: [facility] ENDOSCOPY;  Service: Gastroenterology;  Laterality: N/A;  microscopic colitis; diarrhea; iron def anemia  POST:DUODENITIS  R/O CELIAC DISEASE; GASTRITIS   • FAT GRAFTING Right 2019    Procedure: RIGHT FAT GRAFTING WITH REVOLVE;  Surgeon: MADELYN Arreola MD;  Location: [facility] OR;  Service: Plastics   • HYSTERECTOMY      Total   • MASTECTOMY           OB/GYN HISTORY: She is  2, para 2. Menarche at age 10. First childbirth wa s at age 26. She breast-fed her second child for 6 weeks. She was on birth control until her first childbirth and then used a Mirena IUD which she just had removed 3 months ago.     FAMILY HISTORY:  Parents are in their late 50s and are healthy. She has no siblings. She has a paternal aunt with breast cancer in 40s, maternal grandmother with breast cancer at age 58, and both of the grandmother'  s sisters had breast cancer in their 50s. She has a maternal uncle with prostate cancer at age 54. There is no ovarian, uterine or  pancreatic cancer in the family that she is aware of.      HEMATOLOGIC/ONCOLOGIC HISTORY:   The patient was seen on 08/05/2014, having had bilateral mastectomies. Right breast had a 5 mm residual invasive tumor, grade 3, with clear margins, two negative sentinel nodes (I cannot tell if the lymph node with the clip was remove d and we will have to assess this). Left breast shows a complete pathological response with two negative sentinel nodes. The decision was made to review her for radiation therapy because of enlarged right internal mammary node, pretreatment, and to star t tamoxifen. She is going to have her ovaries removed later in the year when she has expanders put in and we can switch to an AI at that point. I am looking into clinical trials for BCRA positive patients with residual disease and for triple negative canc ers with residual disease but the 5 mm tumor may disqualify her.        The patient was seen on 09/02/2014 with additional surgery to remove the lymph node in her axilla with the clip in it which was thankfully benign in addition to 3 other nodes which were nega tive. Radiation to the internal mammary chain planned plus tamoxifen for 5 years. The NSABP B55 study is expected to have an amendment including hormone positive patients and we will revisit this issue after her radiation. Her port has been removed.        NSA BP amendment has not yet gone through to include patients on tamoxifen, therefore, she is not eligible for NSABP B-55 as of 10/21/2014. Tamoxifen held for 1 month because of some dizzy spells and hysterectomy and oophorectomy planned for early 2015, at wh ich point we will switch to Arimidex.        The patient was seen on 02/25/2014 having CT scans because a preop chest x-ray before hysterectomy showed a shadow in the right apex. CT scans showed what seemed to be radiation change in the apex of the right lung. No abnormality in the neck or abdomen and a residual small seroma in  the right axilla. Path reports on her hysterectomy and BSO were benign and because of her intolerance of tamoxifen, we started her on Effexor 12.5 b.i.d. to be increased as tolerated and t hen to resume tamoxifen every other day for a month and then go to daily if possible. Once she is stable at this, we will try and switch over to an AI if she can tolerate this.        The patient was seen on 04/21/2015 with a CT scan done to followup on changes noted on a previous CT scans, which were felt to be radiation-related. Thankfully, the scan shows diminished consolidation of the pleural surface and a small subpleural air space disease, stable at 8 x 4 mm and images through the upper abdomen and the re s t of the lung were negative. She is up to 37.5 of Effexor with good control of her hot flashes and taking her tamoxifen every other day and we have asked her to increase her tamoxifen to daily and we will see her back in 3 months and if she is tolerating this well, consider switching to an AI at that time.        Patient seen on 07/21/2015, tolerating tamoxifen well at full dose for the last 3 months since her hysterectomy. Plans to switch to Femara in 2-3 months. Nocturnal cough, which I suspect is allergic or reflux related. Consider a steroid inhaler.        7/17 Arimidex since July 2015.  Overall she is tolerating Arimidex well with very few hot flashes because of the Effexor but she is clearly not herself due to the postmenopausal state and has less energy low libido and I think some amount of depression which is not unusual in this situation.  Her mother had osteoporosis and her baseline bone density is osteopenia which is fairly severe and I added Boniva monthly in addition to calcium and vitamin D    .Her neck pain is resolved she is sleeping okay appetite is fair and apart from fatigue which is related to her postmenopausal state she appears to be doing okay.  She is a little more irritable than usual but  realizes this is part of the postmenopausal state  Complains of some irritable bowel type symptoms with constipation alternating with diarrhea and she is a little more anemic than usual and her ferritin was low and she was given oral iron with good improvement in her hemoglobin.  I'm reluctant to add another test with colonoscopy for at this point and we will wait and see how she does off iron and if her hemoglobin drops and her iron is low again the colonoscopy and EGD will be scheduled .  Her lymphedema is a little better this summer    She continues to have discomfort over the right chest wall where she received radiation and does not feel that the implant is his flexible and stiffer and because of the radiation fibrosis on CAT scan has been concerned about issues here.    Her liver function tests have been barely up on and off and therefore we will do another ultrasound to evaluate the liver and gallbladder.  2/19  she continues to have some tenderness in the rib cage below the right implant and is worried about this.  We did a bone scan in November which was benign but her bone density shows osteoporosis and she has been on Boniva more regularly now but I still want to repeat the bone density in November and add Prolia if it is not improved  8/19  CAT scan of the chest abdomen pelvis showed no evidence of recurrent cancer but a fatty liver was noted  I told her to follow-up with the gastroenterologist to ask how best to deal with her fatty liver and also how long to stay on the steroids as she has no follow-up appointment with them-lymphocytic colitis diagnosed and treated with steroids?  Related to Arimidex    10/19  Switch to tamoxifen again because bone density shows osteoporosis in her spine and both hips patient declined Prolia will add Boniva    5/21  She is doing well overall but had a recurrence of her lymphocytic colitis and Initially we thought maybe Arimidex was causing this colitis so we switch her  to tamoxifen she still had a recurrence therefore I do not think this is drug related-she has not been taking the Boniva so this is not the cause but she is on Effexor and apparently this may cause this so I think it is time to get her off the Effexor which she can only do if she comes off the tamoxifen.  She does complain of some left lower quadrant discomfort on and off and I think she needs repeat colonoscopy because her iron stores are low and she is iron deficient    At this point she has been on hormonal blockade for almost 7 years for small tumor which was completely gone at the time of surgery after neoadjuvant chemotherapy and I think this is adequate treatment although she is very scared to stop the tamoxifen      She has had worsening fatigue and I think this is due to the iron deficiency.   she always has a little discomfort below the right implant which is stable    She is very concerned because of significant weight gain of 20 pounds in the last year some of which is from prednisone and some of which is due to the coronavirus pandemic and lack of exercise and some because of her postmenopausal state we discussed strategies for diet and exercise but hopefully after she stops her tamoxifen weans off the Effexor and this will improve    Ca1 25 is negative -we did an ultrasound of the pancreas last year because insurance would not approve her MRI because of her age    We discussed that this is a good time point and she has had no recurrence of her triple negative breast cancer that had residual disease after neoadjuvant chemo at 5 years.  For left breast cancer with which is hormone positive she had a path CR but we have opted to continue hormonal blockade for total of 7 years  which would be up in September but because of her GI complaints and weight gain I think it is reasonable to stop      SOCIAL HISTORY: She is , lives with her . She is school psychologist. She does not smoke. She drinks  "very minimally. No trouble with drug addiction.    FAMILY HISTORY: Parents are in their late 50s and are healthy. She has no siblings. She has a paternal aunt with breast cancer in 40s, maternal grandmother with breast cancer at age 58,   and both of the grandmother' s sisters had breast cancer in their 50s. She has a maternal uncle with prostate cancer at age 54. There is no ovarian, uterine or pancreatic cancer in the family that she is aware of. BRCA 1 positive    Review of Systems   Constitutional: Positive for unexpected weight change (Weight gain). Negative for fatigue (Worse).   HENT: Negative.    Cardiovascular: Negative.    Gastrointestinal: Positive for abdominal pain (Left lower quadrant discomfort on and off). Negative for diarrhea (stable ).   Endocrine: Positive for heat intolerance (same ).   Genitourinary: Negative.    Neurological: Negative.    Psychiatric/Behavioral: Positive for decreased concentration (same ), dysphoric mood (same ) and sleep disturbance (little better ). The patient is nervous/anxious (better ).    All other systems reviewed and are negative.     A comprehensive 14 point review of systems was performed and was negative except as mentioned.    Medications:  The current medication list was reviewed in the EMR    ALLERGIES:    No Known Allergies    Objective      Vitals:    01/30/23 1121   BP: 133/87   Pulse: 77   Resp: 16   Temp: 97.1 °F (36.2 °C)   TempSrc: Temporal   SpO2: 100%   Weight: 62 kg (136 lb 9.6 oz)   Height: 152.4 cm (60\")   PainSc: 0-No pain     Video visit no vitals  Current Status 1/30/2023   ECOG score 0       Physical Exam   Pulmonary/Chest:           GENERAL:  Well-developed, well-nourished in no acute distress.   SKIN:  Warm, dry without rashes, purpura or petechiae.  HEAD:  Normocephalic.  EYES:  Pupils equal, round and reactive to light.  EOMs intact.  Conjunctivae normal.  EARS:  Hearing intact.  NOSE:  Septum midline.  No excoriations or nasal " discharge.  MOUTH:  Tongue is well-papillated; no stomatitis or ulcers.  Lips normal.  THROAT:  Oropharynx without lesions or exudates.  NECK:  Supple with good range of motion; no thyromegaly or masses, no JVD.  LYMPHATICS:  No cervical, supraclavicular, axillary or inguinal adenopathy.  CHEST:  Lungs clear to percussion and auscultation. Good airflow.  BREASTS: Bilateral implants benign there is no axillary adenopathy.Tenderness in the subcostal and intramammary rib cage right greater than left -point tenderness in the costochondral junction below the right breast-stable   Mild redness in the right implant skin which blanches with pressure  CARDIAC:  Regular rate and rhythm without murmurs, rubs or gallops. Normal S1,S2.  ABDOMEN:  Soft, nontender with no organomegaly or masses.  No mass in the left lower quadrant  EXTREMITIES:  No clubbing, cyanosis or edema.  NEUROLOGICAL:  Cranial Nerves II-XII grossly intact.  No focal neurological deficits.  PSYCHIATRIC:  Normal affect and mood.     I have reexamined the patient and the results are consistent with the previously documented exam. Shiv Moreno MD       RECENT LABS:   WBC   Date Value Ref Range Status   01/30/2023 5.38 3.40 - 10.80 10*3/mm3 Final   05/10/2022 6.6 3.4 - 10.8 x10E3/uL Final     RBC   Date Value Ref Range Status   01/30/2023 4.33 3.77 - 5.28 10*6/mm3 Final   05/10/2022 4.36 3.77 - 5.28 x10E6/uL Final     Hemoglobin   Date Value Ref Range Status   01/30/2023 14.0 12.0 - 15.9 g/dL Final     Hematocrit   Date Value Ref Range Status   01/30/2023 42.4 34.0 - 46.6 % Final     Platelets   Date Value Ref Range Status   01/30/2023 296 140 - 450 10*3/mm3 Final      FINDINGS: Sonographic evaluation of the liver and selective structures  of the right upper quadrant was performed. The right kidney has a normal  appearance. No abnormality of the liver is appreciated. The liver  measures on the order of 17.5 cm in anterior to posterior dimension.  The  gallbladder has a normal appearance. The common bile duct measures 0.4  cm in diameter. Per the sonographer, the patient was nontender in the  right upper quadrant during the examination. Visualized portion of the  pancreas appears normal.      IMPRESSION:  Negative liver sonogram.      This report was finalized on 4/14/2017       NUCLEAR MEDICINE WHOLE BODY BONE SCAN     IMPRESSION:  No scintigraphic evidence for bony metastatic disease.     This report was finalized on 11/17/2018     CT CHEST, ABDOMEN, AND PELVIS WITH IV CONTRAST  IMPRESSION:  1. There is no convincing evidence for metastatic disease.  2. Paucity of formed stool within the colon. There is no colonic  thickening to suggest colitis.  3. Hepatic steatosis.         Assessment & Plan    [id number] triple negative right breast cancer. [id number] dose dense Adriamycin Cytoxan and weekly carbotaxol-2014  · Right internal mammary node positive on CT scan.   · Neoadjuvant dose dense Adriamycin/Cytoxan, weekly Taxol started on 01/20/2014 with plan for axillary dissection on the right and sentinel node on left, bilateral mastectomies plus radiation to the right breast and internal mammary nodes.  · Addition of carboplatin with weekly Taxol to start on 03/24/2014  · The patient was seen on 08/05/2014, postoperatively; bilateral mastectomies and sentinel nodes with a 5 mm residua l grade 3 tumor in the right breast with two negative sentinel nodes (cannot tell if the node with a clip was removed). Complete response in the left breast with two negative sentinel nodes. The decision was made to consider radiation because of her pret reatment internal mammary node on the right and tamoxifen therapy for the hormone positive breast cancer on right. Clinical trials are being looked into.   · Additional surgery to remove the lymph node with the clip in the right axilla, 3 nodes removed and negative for metastatic disease.   ·    2.cT1Nx left breast cancer  invasive ductal carcinoma, ER/DE positive, HER2 negative. [id number]  ·  8/14 on tamoxifen-switch to Arimidex after oophorectomy in July 2015  · 5 years of Arimidex +2 years of tamoxifen given adjuvant-discontinued 2021  ·   3. BRCA 1 positive. Post hysterectomy and oophorectomy  4.  Lymphedema right arm improved with a sleeve  5.  Mild depression declines any treatment for this now  6.  Mild anemia due to iron deficiency responding to oral iron  · New diagnosis of celiac disease 2022 probable cause of iron deficiency  7.  Osteopenia moderately severe-declined Prolia -Boniva                     was started   ·  Osteoporosis documented in 11/21 Prolia to start Boniva to stop                  8.  Mildly elevated LFTs with negative liver and pancreas ultrasound -                     CAT scan consistent with fatty liver  9.  Exacerbation of hemorrhoidal bleeding due to diarrhea          10.  Lymphocytic colitis on budesonide   11. Celiac disease diagnosed by biopsy and serology in              Plan  1  Check Ca1 25 annually  2..  MRCP will be delayed till age 45  3. Prolia for osteoporosis today  3. return in 6 months for follow-up.  For Prolia with bone density              1/30/2023      CC:

## 2023-01-30 NOTE — NURSING NOTE
Arrived  for prolia injection. Indication and side effects reviewed. Denies recent dental work. Labs and medications verified. Copy of lab results given.Prolia administered in left arm without incidence. Instructed to call prescribing MD for any concerns or questions and instructed on how to schedule future appts.  Pt vu and discharged in stable condition.

## 2023-06-07 NOTE — THERAPY TREATMENT NOTE
Outpatient Physical Therapy Lymphedema Progress Note  [facility]     Patient Name: Sravani Sky  : 1980  MRN: [id number]  Today's Date: 2020        Visit Date: 2020    Visit Dx:    ICD-10-CM ICD-9-CM   1. Postmastectomy lymphedema syndrome  I97.2 457.0   2. Malignant neoplasm of central portion of both breasts in female, estrogen receptor positive (CMS/HCC)  C50.111 174.1    Z17.0 V86.0    C50.112    3. S/P bilateral mastectomy  Z90.13 V45.71   4. Aftercare postmastectomy for breast reconstruction  Z42.1 V51.0   5. Edema of hand  R60.0 782.3   6. Right hand pain  M79.641 729.5   7. Osteopenia of lower leg, unspecified laterality  M85.869 733.90   8. BRCA1 genetic carrier  Z15.01 V84.01    Z15.09        Patient Active Problem List   Diagnosis   • Malignant neoplasm of female breast (CMS/HCC)   • BRCA1 genetic carrier   • Anemia   • Long term current use of aromatase inhibitor   • Osteopenia   • Diarrhea of presumed infectious origin   • Elevated transaminase level   • Fatty liver   • Diarrhea due to malabsorption   • Lymphocytic colitis        Lymphedema     Row Name 20 0745             Lymphedema Assessment    Lymphedema Classification  RUE:;stage 1 (Spontaneously Reversible);LUE:;at risk/stage 0;secondary  -SC      Lymphedema Cancer Related Sx  bilateral;simple mastectomy;sentinel node biopsy;reconstructive;right;axillary dissection;hysterectomy  -SC      Lymphedema Surgery Comments  2014 left mediport; 2014 R TM, SLNB L TM SLBN, B TE; 14 excision R axillary mass/removal of port; 2015 second stage reconstruction expanders to implants; 06/15/2015 removal left deflated implant, new implant; 2019 right capsular contracture plastic surgery  -SC      Lymph Nodes Removed #  5 R 0/2; 0/3 L 0/2  -SC      Positive Lymph Nodes #  0  -SC      Cancer Comments  triple negative  -SC      Chemo Received  yes  -SC      Chemo Treatments #/Timeframe  NAC  to   Case Management/IDT follow up.   IDT continues to recommend IRF level of care as patient continue to make progress with all therapies.   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  Mariela indicates she and her sister will discuss options w/ their parents.     Also, pt was at [facility] for 100 days last summer.    FMLA forms for dtr completed.      DC needs:  Home health for PT/OT/RN ([facility])  Follow up with pcp.     2014  -SC      Radiation Therapy Received  yes  -SC      Radiation Treatments #/Timeframe  R chest/axilla completed Oct 2014  -SC      Infections or Cellulitis?  no  -SC      Lymphedema Assessment Comments  stage 1 lymphedema right hand currently, dorsal hand, resolving  -SC         Lymphedema Edema Assessment    Ptting Edema Category  By grade out of 4  -SC      Pitting Edema  + 1/4 (+1 of 4) negative pitting noted today  -SC      Edema Assessment Comment  very mild visible edema right dorsal hand, non pitting, soft, no skin irritation noted, healing well  -SC         Skin Changes/Observations    Skin Observations Comment  intact  -SC         Lymphedema Sensation    Lymphedema Sensation Tests  light touch  -SC      Lymphedema Light Touch  RUE:;mild impairment  -SC         Lymphedema Measurements    Measurement Type(s)  --  -SC      Quick Girth Areas  --  -SC         Manual Lymphatic Drainage    Manual Lymphatic Drainage  --  -SC      Initial Sequence  --  -SC      Supraclavicular  --  -SC      Shoulder Collectors  --  -SC      Opened Regional Lymph Nodes  --  -SC      Axillary  --  -SC      Inguinal  --  -SC      Opened Anastamoses  --  -SC      Axillo-Inguinal  --  -SC      Extremity Treatment  --  -SC         Compression/Skin Care    Compression/Skin Care  --  -SC      Skin Care  --  -SC      Wrapping Location  --  -SC      Wrapping Location UE  --  -SC      Bandaging Technique  --  -SC      Compression Garment Comments  present with custom daytime glove donned properly, exact reorder placed with sunmed today  -SC        User Key  (r) = Recorded By, (t) = Taken By, (c) = Cosigned By    Initials Name Provider Type    SC Sonja Griffin PT Physical Therapist            PT Ortho     Row Name 12/01/20 7586       Subjective Comments    Subjective Comments  I am doing well. No pain anymore. Doesn't bother me. I wear the night garment every night and sometimes during the day. Wear the custom daytime glove as I am  able. Doing the pump on and off. The night garment seems to be what helps me the most.   -SC       Precautions and Contraindications    Precautions  bilateral implants  -SC    Contraindications  B SLNB, no IV/BP (R UE), no modalities, triple negative  -SC       Subjective Pain    Able to rate subjective pain?  yes  -SC    Pre-Treatment Pain Level  0  -SC       Posture/Observations    Posture- WNL  Posture is WNL  -SC       General ROM    GENERAL ROM COMMENTS  B UEs WNLs  -SC       MMT (Manual Muscle Testing)    General MMT Comments  B UEs WNLs  -SC       Gait/Stairs (Locomotion)    Comment (Gait/Stairs)  normal  -SC      User Key  (r) = Recorded By, (t) = Taken By, (c) = Cosigned By    Initials Name Provider Type    Sonja Delgado, PT Physical Therapist                  PT Assessment/Plan     Row Name 12/01/20 0745          PT Assessment    Impairments  Edema;Impaired lymphatic circulation  -SC     Assessment Comments  Mrs. Sky is a pleasant 40 year old female, history of BRCA (+) bilateral breast cancer, triple negative, left mediport placement in 2014, completed NAC 2014, s/p bilateral mastectomy with sentinel node biopsy bilateral with immediate TE reconstruction 2014, right axillary reexcision 2014, completed radiation 2014 to right chest wall/axilla, developed post mastectomy lymphedema syndrome right hand, completed formal therapy in lymphedema clinic until 2016, had second stage reconstruction in 2015, with additional surgeries for reconstruction of breast in 2015 and 2019, port removal in 2014, hysterectomy, returned 09/24/2020 with noted return of lymphedema right hand with right hand pain insidious onset in July 2020, presented with marked worn out garments, no new products since 2016. Measureable and visible edema right hand, s/s consistent with post mastectomy lymphedema syndrome. Working currently as school psychologist. Re-established care, currently attended 8 formal therapy sessions  including evaluation in Sept. Currently with near full resolution of symptoms, very mild persisting edema right dorsal hand. To continue with home program at this time including custom daytime garments, custom night time garments, pump, elevation, self massage, and HEP. Did submit for exact reorder of custom daytime garment to assist with self care in 2021. To reorder custom night garment between [id number] unless otherwise indicated. Insurance does cover yearly remakes of night garment and 2 custom daytime garments per year at this time. Due to good progress, to continue with home care and return only as indicated for ordering of new products.  -SC        PT Plan    PT Plan Comments  to return for fitting of new products as indicated  -SC       User Key  (r) = Recorded By, (t) = Taken By, (c) = Cosigned By    Initials Name Provider Type    Sonja Delgado, PT Physical Therapist             OP Exercises     Row Name 12/01/20 2647             Subjective Comments    Subjective Comments  I am doing well. No pain anymore. Doesn't bother me. I wear the night garment every night and sometimes during the day. Wear the custom daytime glove as I am able. Doing the pump on and off. The night garment seems to be what helps me the most.   -SC         Subjective Pain    Able to rate subjective pain?  yes  -SC      Pre-Treatment Pain Level  0  -SC        User Key  (r) = Recorded By, (t) = Taken By, (c) = Cosigned By    Initials Name Provider Type    Sonja Delgado, PT Physical Therapist                      PT OP Goals     Row Name 12/01/20 1360          PT Short Term Goals    STG 1  Patient independent and compliant with initial home exercise program focused on diaphragmatic breathing, range of motion, flexibility to decrease edema and improve lymphatic flow for decreased edema and decreased risk of infection.   -SC     STG 1 Progress  Met  -SC     STG 2  Patient independent and compliant with self-massage  techniques with spouse/family member as needed for improved lymphatic drainage, decreased edema/symptoms, decreased risk of infection and improved transition to self-care of condition.   -SC     STG 2 Progress  Met  -SC     STG 3  Patient independent and compliant with self-wrapping techniques of compression bandages with spouse/family member as indicated for improved self-management of condition.   -SC     STG 3 Progress  Met  -SC        Long Term Goals    LTG Date to Achieve  12/24/20  -SC     LTG 1  Patient demonstrate decreased net edema of right upper extremity >/= 2-4 cm for decrease in edema, symptoms, decreased risk of infection and improved skin care/transition to self-care of condition.   -SC     LTG 1 Progress  Met  -SC     LTG 2  Patient independent and compliant with home pneumatic compression pump for transition to self-care of condition.  -SC     LTG 2 Progress  Met  -SC     LTG 3  Patient independent and compliant with daytime/night time OTS vs custom compression garments as indicated for decreased risk of lymphedema and infection and safety with transition of self-care of condition.   -SC     LTG 3 Progress  Met  -SC     LTG 4  Patient independent and compliant with advanced Home Exercise Program for self-management of condition.   -SC     LTG 4 Progress  Met  -SC     LTG 5  Patient transition to cardiovascular exercise program (walking) >/=150 to 180 mins/week with moderate intensity for improved heart health, weight loss, decreased risk of osteoporosis and improved phase angle/metabolic rate.  -SC     LTG 5 Progress  Ongoing  -SC        Time Calculation    PT Goal Re-Cert Due Date  12/24/20  -SC       User Key  (r) = Recorded By, (t) = Taken By, (c) = Cosigned By    Initials Name Provider Type    Sonja Delgado, PT Physical Therapist          Therapy Education  Education Details: self care, compression, ordering  Given: Symptoms/condition management, Edema management  Program:  Modified  How Provided: Verbal, Demonstration  Provided to: Patient  Level of Understanding: Teach back education performed, Demonstrated, Verbalized              Time Calculation:   Start Time: 0745  Stop Time: 0815  Time Calculation (min): 30 min   Therapy Charges for Today     Code Description Service Date Service Provider Modifiers Qty    28408558409 HC PT THER PROC EA 15 MIN 12/1/2020 Sonja Griffin, PT GP 2                    Sonja William, PT  12/1/2020

## 2023-09-15 ENCOUNTER — OFFICE VISIT (OUTPATIENT)
Dept: ONCOLOGY | Facility: CLINIC | Age: 43
End: 2023-09-15
Payer: COMMERCIAL

## 2023-09-15 ENCOUNTER — LAB (OUTPATIENT)
Dept: LAB | Facility: HOSPITAL | Age: 43
End: 2023-09-15
Payer: COMMERCIAL

## 2023-09-15 ENCOUNTER — INFUSION (OUTPATIENT)
Dept: ONCOLOGY | Facility: HOSPITAL | Age: 43
End: 2023-09-15
Payer: COMMERCIAL

## 2023-09-15 ENCOUNTER — APPOINTMENT (OUTPATIENT)
Dept: ONCOLOGY | Facility: HOSPITAL | Age: 43
End: 2023-09-15
Payer: COMMERCIAL

## 2023-09-15 VITALS
HEART RATE: 84 BPM | SYSTOLIC BLOOD PRESSURE: 130 MMHG | TEMPERATURE: 98.2 F | OXYGEN SATURATION: 99 % | DIASTOLIC BLOOD PRESSURE: 85 MMHG | WEIGHT: 147.1 LBS | BODY MASS INDEX: 28.88 KG/M2 | RESPIRATION RATE: 18 BRPM | HEIGHT: 60 IN

## 2023-09-15 DIAGNOSIS — M81.8 OTHER OSTEOPOROSIS WITHOUT CURRENT PATHOLOGICAL FRACTURE: ICD-10-CM

## 2023-09-15 DIAGNOSIS — Z17.0 MALIGNANT NEOPLASM OF BREAST IN FEMALE, ESTROGEN RECEPTOR POSITIVE, UNSPECIFIED LATERALITY, UNSPECIFIED SITE OF BREAST: ICD-10-CM

## 2023-09-15 DIAGNOSIS — Z15.01 BRCA1 GENETIC CARRIER: ICD-10-CM

## 2023-09-15 DIAGNOSIS — Z15.09 BRCA1 GENETIC CARRIER: ICD-10-CM

## 2023-09-15 DIAGNOSIS — M81.8 OTHER OSTEOPOROSIS WITHOUT CURRENT PATHOLOGICAL FRACTURE: Primary | ICD-10-CM

## 2023-09-15 DIAGNOSIS — C50.919 MALIGNANT NEOPLASM OF BREAST IN FEMALE, ESTROGEN RECEPTOR POSITIVE, UNSPECIFIED LATERALITY, UNSPECIFIED SITE OF BREAST: ICD-10-CM

## 2023-09-15 DIAGNOSIS — N64.4 BREAST PAIN, RIGHT: ICD-10-CM

## 2023-09-15 LAB
ALBUMIN SERPL-MCNC: 4.9 G/DL (ref 3.5–5.2)
ALBUMIN/GLOB SERPL: 1.9 G/DL
ALP SERPL-CCNC: 92 U/L (ref 39–117)
ALT SERPL W P-5'-P-CCNC: 101 U/L (ref 1–33)
ANION GAP SERPL CALCULATED.3IONS-SCNC: 14.2 MMOL/L (ref 5–15)
AST SERPL-CCNC: 71 U/L (ref 1–32)
BASOPHILS # BLD AUTO: 0.04 10*3/MM3 (ref 0–0.2)
BASOPHILS NFR BLD AUTO: 0.7 % (ref 0–1.5)
BILIRUB SERPL-MCNC: 0.3 MG/DL (ref 0–1.2)
BUN SERPL-MCNC: 15 MG/DL (ref 6–20)
BUN/CREAT SERPL: 27.8 (ref 7–25)
CALCIUM SPEC-SCNC: 10.5 MG/DL (ref 8.6–10.5)
CANCER AG125 SERPL QL: 2.8 U/ML (ref 0–38.1)
CHLORIDE SERPL-SCNC: 102 MMOL/L (ref 98–107)
CO2 SERPL-SCNC: 24.8 MMOL/L (ref 22–29)
CREAT SERPL-MCNC: 0.54 MG/DL (ref 0.6–1.1)
DEPRECATED RDW RBC AUTO: 43.9 FL (ref 37–54)
EGFRCR SERPLBLD CKD-EPI 2021: 117.3 ML/MIN/1.73
EOSINOPHIL # BLD AUTO: 0.14 10*3/MM3 (ref 0–0.4)
EOSINOPHIL NFR BLD AUTO: 2.6 % (ref 0.3–6.2)
ERYTHROCYTE [DISTWIDTH] IN BLOOD BY AUTOMATED COUNT: 12.2 % (ref 12.3–15.4)
GLOBULIN UR ELPH-MCNC: 2.6 GM/DL
GLUCOSE SERPL-MCNC: 93 MG/DL (ref 65–99)
HCT VFR BLD AUTO: 41.1 % (ref 34–46.6)
HGB BLD-MCNC: 13.8 G/DL (ref 12–15.9)
IMM GRANULOCYTES # BLD AUTO: 0.02 10*3/MM3 (ref 0–0.05)
IMM GRANULOCYTES NFR BLD AUTO: 0.4 % (ref 0–0.5)
LYMPHOCYTES # BLD AUTO: 1.32 10*3/MM3 (ref 0.7–3.1)
LYMPHOCYTES NFR BLD AUTO: 24.7 % (ref 19.6–45.3)
MAGNESIUM SERPL-MCNC: 2.2 MG/DL (ref 1.6–2.6)
MCH RBC QN AUTO: 33.1 PG (ref 26.6–33)
MCHC RBC AUTO-ENTMCNC: 33.6 G/DL (ref 31.5–35.7)
MCV RBC AUTO: 98.6 FL (ref 79–97)
MONOCYTES # BLD AUTO: 0.48 10*3/MM3 (ref 0.1–0.9)
MONOCYTES NFR BLD AUTO: 9 % (ref 5–12)
NEUTROPHILS NFR BLD AUTO: 3.35 10*3/MM3 (ref 1.7–7)
NEUTROPHILS NFR BLD AUTO: 62.6 % (ref 42.7–76)
NRBC BLD AUTO-RTO: 0 /100 WBC (ref 0–0.2)
PHOSPHATE SERPL-MCNC: 3.9 MG/DL (ref 2.5–4.5)
PLATELET # BLD AUTO: 247 10*3/MM3 (ref 140–450)
PMV BLD AUTO: 9.3 FL (ref 6–12)
POTASSIUM SERPL-SCNC: 5 MMOL/L (ref 3.5–5.2)
PROT SERPL-MCNC: 7.5 G/DL (ref 6–8.5)
RBC # BLD AUTO: 4.17 10*6/MM3 (ref 3.77–5.28)
SODIUM SERPL-SCNC: 141 MMOL/L (ref 136–145)
WBC NRBC COR # BLD: 5.35 10*3/MM3 (ref 3.4–10.8)

## 2023-09-15 PROCEDURE — 84100 ASSAY OF PHOSPHORUS: CPT

## 2023-09-15 PROCEDURE — 80053 COMPREHEN METABOLIC PANEL: CPT

## 2023-09-15 PROCEDURE — 96372 THER/PROPH/DIAG INJ SC/IM: CPT

## 2023-09-15 PROCEDURE — 86304 IMMUNOASSAY TUMOR CA 125: CPT | Performed by: INTERNAL MEDICINE

## 2023-09-15 PROCEDURE — 85025 COMPLETE CBC W/AUTO DIFF WBC: CPT

## 2023-09-15 PROCEDURE — 36415 COLL VENOUS BLD VENIPUNCTURE: CPT

## 2023-09-15 PROCEDURE — 25010000002 DENOSUMAB 60 MG/ML SOLUTION PREFILLED SYRINGE: Performed by: INTERNAL MEDICINE

## 2023-09-15 PROCEDURE — 83735 ASSAY OF MAGNESIUM: CPT

## 2023-09-15 RX ADMIN — DENOSUMAB 60 MG: 60 INJECTION SUBCUTANEOUS at 10:06

## 2023-09-15 NOTE — PROGRESS NOTES
Subjective      REASONS FOR FOLLOWUP:   1.  Bilateral breast cancer ER/[place] positive left and triple negative on the right  2/BRCA1 positive          History of Present Illness  patient is a 42-year-old white female with BRCA1 positivity and bilateral breast cancers currently off antihormonal therapy after 7 years of treatment (on Arimidex for the last 5 years and  2year of tamoxifen ) 10years from diagnosis      She has been found to have celiac disease which explains all her GI symptoms and since going on a gluten-free diet GI symptoms are significantly improved-      She is off her Effexor and feels better overall but she continues to have some discomfort in the left lower quadrant and since her colonoscopy was totally benign - CAT scan to evaluate this was negative and her symptoms improved with some fiber and constipation measures.     She is now complaining of worsening right chest wall pain around her implant inferiorly and superiorly and more tightness for the last 3 to 4 months.  She has had this pain for many years and we have evaluated before but she says it is different and she has noted some mild color change on the implant skin      Her bone density shows mild improvement in the lumbar bone density and stable osteoporosis in the hips and we will continue Prolia for at least 6 doses     She is here for her Fifth dose of Prolia next bone density is due in December 2024    Pancreatic screening will start at age 45-annual Ca1 25's are being followed    Active Ambulatory Problems     Diagnosis Date Noted    Malignant neoplasm of female breast 04/15/2016    BRCA1 genetic carrier 04/15/2016    Anemia 08/04/2016    Long term current use of aromatase inhibitor 08/04/2016    Osteopenia 08/04/2016    Diarrhea of presumed infectious origin 08/06/2019    Elevated transaminase level 08/07/2019    Fatty liver 08/19/2019    Diarrhea due to malabsorption 09/12/2019    Lymphocytic colitis 09/12/2019    Iron adverse  reaction 05/10/2021    Diarrhea 2021    Low ferritin level 2021    Celiac disease 11/10/2021    Other osteoporosis without current pathological fracture 2021     Resolved Ambulatory Problems     Diagnosis Date Noted    No Resolved Ambulatory Problems     Past Medical History:   Diagnosis Date    Cancer     Heart burn     History of radiation therapy     Lymphedema     Slow to wake up after anesthesia      Past Surgical History:   Procedure Laterality Date    BREAST AUGMENTATION Right 2019    Procedure: RIGHT CAPSULOTOMY;  Surgeon: MADELYN Arreola MD;  Location: [facility] OR;  Service: Plastics    BREAST IMPLANT SURGERY Left     BREAST SURGERY Bilateral 2014    Mastectomy    BREAST TISSUE EXPANDER REMOVAL INSERTION OF IMPLANT       SECTION  2007, 2010    X2    COLONOSCOPY N/A 2019    Hypertrophied anal papilla, NBIH    COLONOSCOPY N/A 2021    Procedure: COLONOSCOPY TO CECUM/TI WITH POLYPECTOMY ( HOT SNARE) AND BIOPSY;SALINELIFT; RESOLUTION CLIP X2;  Surgeon: Sonja Sales MD;  Location: [facility] ENDOSCOPY;  Service: Gastroenterology;  Laterality: N/A;  microscopic colitis; diarrhea; iron def anemia  POST: COLON POLYP;  HEMORRHOIDS    ENDOSCOPY N/A 2021    Procedure: ESOPHAGOGASTRODUODENOSCOPY WITH BIOPSY;  Surgeon: Sonja Sales MD;  Location: [facility] ENDOSCOPY;  Service: Gastroenterology;  Laterality: N/A;  microscopic colitis; diarrhea; iron def anemia  POST:DUODENITIS  R/O CELIAC DISEASE; GASTRITIS    FAT GRAFTING Right 2019    Procedure: RIGHT FAT GRAFTING WITH REVOLVE;  Surgeon: MADELYN Arreola MD;  Location: [facility] OR;  Service: Plastics    HYSTERECTOMY      Total    MASTECTOMY           OB/GYN HISTORY: She is  2, para 2. Menarche at age 10. First childbirth wa s at age 26. She breast-fed her second child for 6 weeks. She was on birth control until her first childbirth and then used a Mirena IUD which she just had removed 3 months ago.      FAMILY HISTORY:  Parents are in their late 50s and are healthy. She has no siblings. She has a paternal aunt with breast cancer in 40s, maternal grandmother with breast cancer at age 58, and both of the grandmother'  s sisters had breast cancer in their 50s. She has a maternal uncle with prostate cancer at age 54. There is no ovarian, uterine or pancreatic cancer in the family that she is aware of.      HEMATOLOGIC/ONCOLOGIC HISTORY:   The patient was seen on 08/05/2014, having had bilateral mastectomies. Right breast had a 5 mm residual invasive tumor, grade 3, with clear margins, two negative sentinel nodes (I cannot tell if the lymph node with the clip was remove d and we will have to assess this). Left breast shows a complete pathological response with two negative sentinel nodes. The decision was made to review her for radiation therapy because of enlarged right internal mammary node, pretreatment, and to star t tamoxifen. She is going to have her ovaries removed later in the year when she has expanders put in and we can switch to an AI at that point. I am looking into clinical trials for BCRA positive patients with residual disease and for triple negative canc ers with residual disease but the 5 mm tumor may disqualify her.        The patient was seen on 09/02/2014 with additional surgery to remove the lymph node in her axilla with the clip in it which was thankfully benign in addition to 3 other nodes which were nega tive. Radiation to the internal mammary chain planned plus tamoxifen for 5 years. The NSABP B55 study is expected to have an amendment including hormone positive patients and we will revisit this issue after her radiation. Her port has been removed.        NSA BP amendment has not yet gone through to include patients on tamoxifen, therefore, she is not eligible for NSABP B-55 as of 10/21/2014. Tamoxifen held for 1 month because of some dizzy spells and hysterectomy and oophorectomy planned  for early 2015, at [facility] point we will switch to Arimidex.        The patient was seen on 02/25/2014 having CT scans because a preop chest x-ray before hysterectomy showed a shadow in the right apex. CT scans showed what seemed to be radiation change in the apex of the right lung. No abnormality in the neck or abdomen and a residual small seroma in the right axilla. Path reports on her hysterectomy and BSO were benign and because of her intolerance of tamoxifen, we started her on Effexor 12.5 b.i.d. to be increased as tolerated and t hen to resume tamoxifen every other day for a month and then go to daily if possible. Once she is stable at this, we will try and switch over to an AI if she can tolerate this.        The patient was seen on 04/21/2015 with a CT scan done to followup on changes noted on a previous CT scans, which were felt to be radiation-related. Thankfully, the scan shows diminished consolidation of the pleural surface and a small subpleural air space disease, stable at 8 x 4 mm and images through the upper abdomen and the re s t of the lung were negative. She is up to 37.5 of Effexor with good control of her hot flashes and taking her tamoxifen every other day and we have asked her to increase her tamoxifen to daily and we will see her back in 3 months and if she is tolerating this well, consider switching to an AI at that time.        Patient seen on 07/21/2015, tolerating tamoxifen well at full dose for the last 3 months since her hysterectomy. Plans to switch to Femara in 2-3 months. Nocturnal cough, which I suspect is allergic or reflux related. Consider a steroid inhaler.        7/17 Arimidex since July 2015.  Overall she is tolerating Arimidex well with very few hot flashes because of the Effexor but she is clearly not herself due to the postmenopausal state and has less energy low libido and I think some amount of depression which is not unusual in this situation.  Her mother had osteoporosis  and her baseline bone density is osteopenia which is fairly severe and I added Boniva monthly in addition to calcium and vitamin D    .Her neck pain is resolved she is sleeping okay appetite is fair and apart from fatigue which is related to her postmenopausal state she appears to be doing okay.  She is a little more irritable than usual but realizes this is part of the postmenopausal state  Complains of some irritable bowel type symptoms with constipation alternating with diarrhea and she is a little more anemic than usual and her ferritin was low and she was given oral iron with good improvement in her hemoglobin.  I'm reluctant to add another test with colonoscopy for at this point and we will wait and see how she does off iron and if her hemoglobin drops and her iron is low again the colonoscopy and EGD will be scheduled .  Her lymphedema is a little better this summer    She continues to have discomfort over the right chest wall where she received radiation and does not feel that the implant is his flexible and stiffer and because of the radiation fibrosis on CAT scan has been concerned about issues here.    Her liver function tests have been barely up on and off and therefore we will do another ultrasound to evaluate the liver and gallbladder.  2/19  she continues to have some tenderness in the rib cage below the right implant and is worried about this.  We did a bone scan in November which was benign but her bone density shows osteoporosis and she has been on Boniva more regularly now but I still want to repeat the bone density in November and add Prolia if it is not improved  8/19  CAT scan of the chest abdomen pelvis showed no evidence of recurrent cancer but a fatty liver was noted  I told her to follow-up with the gastroenterologist to ask how best to deal with her fatty liver and also how long to stay on the steroids as she has no follow-up appointment with them-lymphocytic colitis diagnosed and treated  with steroids?  Related to Arimidex    10/19  Switch to tamoxifen again because bone density shows osteoporosis in her spine and both hips patient declined Prolia will add Boniva    5/21  She is doing well overall but had a recurrence of her lymphocytic colitis and Initially we thought maybe Arimidex was causing this colitis so we switch her to tamoxifen she still had a recurrence therefore I do not think this is drug related-she has not been taking the Boniva so this is not the cause but she is on Effexor and apparently this may cause this so I think it is time to get her off the Effexor which she can only do if she comes off the tamoxifen.  She does complain of some left lower quadrant discomfort on and off and I think she needs repeat colonoscopy because her iron stores are low and she is iron deficient    At this point she has been on hormonal blockade for almost 7 years for small tumor which was completely gone at the time of surgery after neoadjuvant chemotherapy and I think this is adequate treatment although she is very scared to stop the tamoxifen      She has had worsening fatigue and I think this is due to the iron deficiency.   she always has a little discomfort below the right implant which is stable    She is very concerned because of significant weight gain of 20 pounds in the last year some of which is from prednisone and some of which is due to the coronavirus pandemic and lack of exercise and some because of her postmenopausal state we discussed strategies for diet and exercise but hopefully after she stops her tamoxifen weans off the Effexor and this will improve    Ca1 25 is negative -we did an ultrasound of the pancreas last year because insurance would not approve her MRI because of her age    We discussed that this is a good time point and she has had no recurrence of her triple negative breast cancer that had residual disease after neoadjuvant chemo at 5 years.  For left breast cancer with  "which is hormone positive she had a path CR but we have opted to continue hormonal blockade for total of 7 years  which would be up in September but because of her GI complaints and weight gain I think it is reasonable to stop      SOCIAL HISTORY: She is , lives with her . She is school psychologist. She does not smoke. She drinks very minimally. No trouble with drug addiction.    FAMILY HISTORY: Parents are in their late 50s and are healthy. She has no siblings. She has a paternal aunt with breast cancer in 40s, maternal grandmother with breast cancer at age 58,   and both of the grandmother' s sisters had breast cancer in their 50s. She has a maternal uncle with prostate cancer at age 54. There is no ovarian, uterine or pancreatic cancer in the family that she is aware of. BRCA 1 positive    Review of Systems   Constitutional:  Negative for fatigue (Worse) and unexpected weight change.   HENT: Negative.     Cardiovascular:  Positive for chest pain (Around the right implant).   Gastrointestinal:  Negative for abdominal pain and diarrhea (stable ).   Endocrine: Negative for heat intolerance (same).   Genitourinary: Negative.    Neurological: Negative.    Psychiatric/Behavioral:  Positive for decreased concentration (same ) and sleep disturbance (little better ). Negative for dysphoric mood. The patient is nervous/anxious (better ).    All other systems reviewed and are negative.   A comprehensive 14 point review of systems was performed and was negative except as mentioned.    Medications:  The current medication list was reviewed in the EMR    ALLERGIES:    No Known Allergies    Objective      Vitals:    09/15/23 0907   BP: 130/85   Pulse: 84   Resp: 18   Temp: 98.2 °F (36.8 °C)   TempSrc: Temporal   SpO2: 99%   Weight: 66.7 kg (147 lb 1.6 oz)   Height: 152.4 cm (60\")   PainSc: 0-No pain     Video visit no vitals      9/15/2023     9:08 AM   Current Status   ECOG score 0       Physical Exam "   Pulmonary/Chest:         GENERAL:  Well-developed, well-nourished in no acute distress.   SKIN:  Warm, dry without rashes, purpura or petechiae.  HEAD:  Normocephalic.  EYES:  Pupils equal, round and reactive to light.  EOMs intact.  Conjunctivae normal.  EARS:  Hearing intact.  NOSE:  Septum midline.  No excoriations or nasal discharge.  MOUTH:  Tongue is well-papillated; no stomatitis or ulcers.  Lips normal.  THROAT:  Oropharynx without lesions or exudates.  NECK:  Supple with good range of motion; no thyromegaly or masses, no JVD.  LYMPHATICS:  No cervical, supraclavicular, axillary or inguinal adenopathy.  CHEST:  Lungs clear to percussion and auscultation. Good airflow.  BREASTS: Bilateral implants benign there is no axillary adenopathy.Tenderness in the subcostal and intramammary rib cage right greater than left -point tenderness in the costochondral junction below the right breast-slightly worse and a small rounded bluish area where the skin appears thinner over the implant  Mild redness in the right implant skin which blanches with pressure  CARDIAC:  Regular rate and rhythm without murmurs, rubs or gallops. Normal S1,S2.  ABDOMEN:  Soft, nontender with no organomegaly or masses.  No mass in the left lower quadrant  EXTREMITIES:  No clubbing, cyanosis or edema.  NEUROLOGICAL:  Cranial Nerves II-XII grossly intact.  No focal neurological deficits.  PSYCHIATRIC:  Normal affect and mood.     I have reexamined the patient and the results are consistent with the previously documented exam. Shiv Moreno MD       RECENT LABS:   WBC   Date Value Ref Range Status   09/15/2023 5.35 3.40 - 10.80 10*3/mm3 Final   05/10/2022 6.6 3.4 - 10.8 x10E3/uL Final     RBC   Date Value Ref Range Status   09/15/2023 4.17 3.77 - 5.28 10*6/mm3 Final   05/10/2022 4.36 3.77 - 5.28 x10E6/uL Final     Hemoglobin   Date Value Ref Range Status   09/15/2023 13.8 12.0 - 15.9 g/dL Final     Hematocrit   Date Value Ref Range Status    09/15/2023 41.1 34.0 - 46.6 % Final     Platelets   Date Value Ref Range Status   09/15/2023 247 140 - 450 10*3/mm3 Final      FINDINGS: Sonographic evaluation of the liver and selective structures  of the right upper quadrant was performed. The right kidney has a normal  appearance. No abnormality of the liver is appreciated. The liver  measures on the order of 17.5 cm in anterior to posterior dimension. The  gallbladder has a normal appearance. The common bile duct measures 0.4  cm in diameter. Per the sonographer, the patient was nontender in the  right upper quadrant during the examination. Visualized portion of the  pancreas appears normal.      IMPRESSION:  Negative liver sonogram.      This report was finalized on 4/14/2017       NUCLEAR MEDICINE WHOLE BODY BONE SCAN     IMPRESSION:  No scintigraphic evidence for bony metastatic disease.     This report was finalized on 11/17/2018     CT CHEST, ABDOMEN, AND PELVIS WITH IV CONTRAST  IMPRESSION:  1. There is no convincing evidence for metastatic disease.  2. Paucity of formed stool within the colon. There is no colonic  thickening to suggest colitis.  3. Hepatic steatosis.         Assessment & Plan    1.[id number] triple negative right breast cancer. [id number] dose dense Adriamycin Cytoxan and weekly carbotaxol-2014  Right internal mammary node positive on CT scan.   Neoadjuvant dose dense Adriamycin/Cytoxan, weekly Taxol started on 01/20/2014 with plan for axillary dissection on the right and sentinel node on left, bilateral mastectomies plus radiation to the right breast and internal mammary nodes.  Addition of carboplatin with weekly Taxol to start on 03/24/2014  The patient was seen on 08/05/2014, postoperatively; bilateral mastectomies and sentinel nodes with a 5 mm residua l grade 3 tumor in the right breast with two negative sentinel nodes (cannot tell if the node with a clip was removed). Complete response in the left breast with two negative  sentinel nodes. The decision was made to consider radiation because of her pret reatment internal mammary node on the right and tamoxifen therapy for the hormone positive breast cancer on right. Clinical trials are being looked into.   Additional surgery to remove the lymph node with the clip in the right axilla, 3 nodes removed and negative for metastatic disease.   Worsening plane around her right implant to be evaluated on 9/23     2.cT1Nx left breast cancer invasive ductal carcinoma, ER/[place] positive, HER2 negative. [id number]   8/14 on tamoxifen-switch to Arimidex after oophorectomy in July 2015  5 years of Arimidex +2 years of tamoxifen given adjuvant-discontinued 2021    3. BRCA 1 positive. Post hysterectomy and oophorectomy  4.  Lymphedema right arm improved with a sleeve  5.  Mild depression declines any treatment for this now  6.  Mild anemia due to iron deficiency responding to oral iron  New diagnosis of celiac disease 2022 probable cause of iron deficiency  7.  Osteopenia moderately severe-declined Prolia -Boniva                     was started    Osteoporosis documented in 11/21 Prolia to start Boniva to stop                  8.  Mildly elevated LFTs with negative liver and pancreas ultrasound -                     CAT scan consistent with fatty liver  LFTs mildly elevated on 9/14/2020 3 repeat in 1 to 2 months  9.  Exacerbation of hemorrhoidal bleeding due to diarrhea          10.  Lymphocytic colitis on budesonide   11. Celiac disease diagnosed by biopsy and serology              Plan  1  Check Ca1 25 annually  2..  MRCP will be delayed till age 45  3. Prolia for osteoporosis today dose #5  4.  Ultrasound left implant and referred to plastics Dr. Delgado  5.. return in 6 months for follow-up.                9/15/2023      CC:

## 2023-09-18 ENCOUNTER — TELEPHONE (OUTPATIENT)
Dept: ONCOLOGY | Facility: CLINIC | Age: 43
End: 2023-09-18
Payer: COMMERCIAL

## 2023-09-18 NOTE — TELEPHONE ENCOUNTER
Caller: Sravani Sky    Relationship: Self    Best call back number: [phone]    What is the best time to reach you: ANYTIME    Who are you requesting to speak with (clinical staff, provider, specific staff member): CLINICAL    What was the call regarding: PATIENT STATED SHE WAS SUPPOSED TO HAVE A MAMMOGRAM AND U/S WITHIN ONE WEEK, NOT SCHEDULED YET. PLEASE CALL TO ADVISE.

## 2023-09-21 ENCOUNTER — HOSPITAL ENCOUNTER (OUTPATIENT)
Dept: MAMMOGRAPHY | Facility: HOSPITAL | Age: 43
Discharge: HOME | End: 2023-09-21
Payer: COMMERCIAL

## 2023-09-21 ENCOUNTER — HOSPITAL ENCOUNTER (OUTPATIENT)
Dept: ULTRASOUND IMAGING | Facility: HOSPITAL | Age: 43
Discharge: HOME | End: 2023-09-21

## 2023-09-21 DIAGNOSIS — Z15.01 BRCA1 GENETIC CARRIER: ICD-10-CM

## 2023-09-21 DIAGNOSIS — Z17.0 MALIGNANT NEOPLASM OF BREAST IN FEMALE, ESTROGEN RECEPTOR POSITIVE, UNSPECIFIED LATERALITY, UNSPECIFIED SITE OF BREAST: ICD-10-CM

## 2023-09-21 DIAGNOSIS — Z15.09 BRCA1 GENETIC CARRIER: ICD-10-CM

## 2023-09-21 DIAGNOSIS — N64.4 BREAST PAIN, RIGHT: ICD-10-CM

## 2023-09-21 DIAGNOSIS — C50.919 MALIGNANT NEOPLASM OF BREAST IN FEMALE, ESTROGEN RECEPTOR POSITIVE, UNSPECIFIED LATERALITY, UNSPECIFIED SITE OF BREAST: ICD-10-CM

## 2023-09-21 PROCEDURE — 76642 ULTRASOUND BREAST LIMITED: CPT

## 2023-10-03 ENCOUNTER — TELEPHONE (OUTPATIENT)
Dept: ONCOLOGY | Facility: CLINIC | Age: 43
End: 2023-10-03
Payer: COMMERCIAL

## 2023-10-03 NOTE — TELEPHONE ENCOUNTER
Caller: Sravani Sky    Relationship: Self    Best call back number: [phone]    What is the best time to reach you: ANYTIME     Who are you requesting to speak with (clinical staff, provider,  specific staff member): CLINICAL    Do you know the name of the person who called:     What was the call regarding: PATIENT NEEDS A SCRIPT FOR COMPRESSION GARMENTS FOR HER RIGHT HAND AND ARM, SHE HAS ONE FOR DAY & NIGHT      PLEASE CONTACT [employer] -[phone] AND SPEAK TO SASHA DOHERTY    CALL PATIENT BACK TO LET HER KNOW YOU HAVE COMPLETED THE SCRIPT     Is it okay if the provider responds through [employer]: NO

## 2023-10-03 NOTE — TELEPHONE ENCOUNTER
Orders received via fax from [employer] for compression garments. Signed by Dr. Moreno and faxed back along with office note to accompany order. Pt notified that this was sent.

## 2023-10-04 NOTE — PROGRESS NOTES
"Consult (consult)            History of Present Illness  Sravani Sky is a 43 y.o. female who presents to [facility] PLASTIC & RECONSTRUCTIVE SURGERY as a consult   to discuss breast reconstructive options.    Subjective    Pt presents today as new pt.  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  2014 pt had bilateral mastectomy with reconstruction w/implants by Dr Smith and Dr Arreola. She had expanders followed by subpectoral saline implants. Unsure about use of mesh at the occasion   Pt had 4 rounds of chemo treatment before surgery and  radiation on rt breast after surgery 25-30 treatments. Ans she had one round of fat graft.     Pt states she has been having some rt breast pain and discomfort. Pt discussed with oncologist/.  Breast US 9/21/23-Benign    Patient has no known allergies.  Allergies Reconciled.    Review of Systems  All system were reviewed and were negative, except the ones noted above.     @Objective     /78 (BP Location: Left arm, Patient Position: Sitting, Cuff Size: Adult)   Pulse 63   Temp 97.3 øF (36.3 øC) (Temporal)   Ht 152.4 cm (60\")   Wt 66.2 kg (146 lb)   SpO2 100%   BMI 28.51 kg/mý     Body mass index is 28.51 kg/mý.    Physical Exam   Cardiovascular: Normal rate.     Pulmonary/Chest  Effort normal.     Physical exam:  Patient awake, alert, oriented  Respirations are non elaborated  Patient is not tachycardic    Breast exam:     Bilateral breast with left   breast larger than right, no nipples   Axillary Lymphadenopathy: No axillary lymphadenopathy    Base Width (Right Breast):12  Base Width (Left Breast):11  Projection 2  Right arm swelling    Wrist, elbow, midarm   R:17, 29, 33  L:17, 29, 33    Result Review :              Assessment and Plan      Diagnoses and all orders for this visit:    1. Deformity and disproportion of reconstructed breast (Primary)        Additional Order(s):         Breast reconstruction " options were all discussed with the patient at length.     After thorough discussion of risk and benefits of each procedure the patient has elected to proceed.   We will send information for prior authorization.  Photos were obtained.   Patient was given the breast reconstruction pamphlet as well as directed to the ASPS website for additional information.   The patient was made aware that the FDA has discovered rare occurrences between an uncommon form of lymphoma (anaplastic large cell) and women with breast implants.  While the incidence is quite low, the risk of development is real; therefore, any unusual symptoms or signs (most commonly a late fluid collection years later) should be brought to the attention of your physician.  Patient also counseled on risks and benefits of saline versus silicone implants, lifting restrictions, scar placement, risk of capsular contracture, animation deformity, need for likely revision of breast implants at some point in her life, FDA recommendation of MRI every three years to monitor for rupture, and continued mammography for breast cancer surveillance.   We had a long discussion with the patient  today regarding her reconstructive options.  We discussed specific types of reconstruction, including  implants.  I described the typical juno-operative course of the procedure, including the nature of the procedure,  necessity for drains, post-operative activity restriction, and postoperative visits.    I shared information about saline vs. silicone implants, including their differences, risk of capsular contracture, rippling, or leak/rupture, and safety/monitoring issues.  I described Alloderm (human skin) and its use for implant reconstruction.      We reviewed surgical risks including, but not limited to, infection, bleeding, hematoma, seroma, pain, scarring, numbness,  wound healing problems,  asymmetry, need for revisions or further surgeries,  and risks associated with  anesthesia.         Specifically on her case,  I recommend to reposition the muscles back to the chest wall because she has pain due to subpectoral placement of the implants and also replace the implants from saline with silicone. I also recommend to add fat graft to the breast and right axilla and place a vacuum dressing on the right breast. Her arm has lymphedema and she has been working on daily lymphatic therapy and I think she would benefit from having the arm debulked with liposuction. Her implants are constricted and she would like to have more projection.   Consent:  bilateral breast reconstruction with implant replacement and repositioning, fat graft, mesh placement, placement of vacuum dressing   Target implant size:  cc      CPT codes:   34333-94  implantation of biologic implant  19371-50-  capsulectomy  19342-50- delayed insertion of breast prosthesis following reconstruction  69835 incisional wound vac  placement  19380-50 revision of reconstructed breast    Implants Sientra:  Sientra Smooth round gel implant    MP 21594-009, 285, 305, 325, 355 x2  HP [id number],300, 330, 350,  x2  Single use sizer [id number] x1        Mesh:  Mesh:  alloderm rectangle 16x20 perforated [id number] x2   OR time: 3 hours     German Garduno MD, PhD  NPI: [id number]    Women's Health and Cancer Rights Act (WHCRA)  The Women's Health and Cancer Rights Act of 1998 (WHCRA) is a federal law that provides protections to patients who choose to have breast reconstruction in connection with a mastectomy.  Coverage must be provided for:    1.All stages of reconstruction of the breast on which the mastectomy has been performed;  2.Surgery and reconstruction of the other breast to produce a symmetrical appearance; and  Prostheses and treatment of physical complications of all stages of the mastectomy, including lymphedema.    This law applies to two different types of coverage:    1.Group health plans (provided by an  employer or union);  2.Individual health insurance policies (not based on employment).      I spent 60 minutes caring for Sravani on this date of service. This time includes time spent by me in the following activities:performing a medically appropriate examination and/or evaluation , counseling and educating the patient/family/caregiver, documenting information in the medical record, and care coordination            Scribed by Sophia Lieberman, acting as a scribe for German Garduno MD, 10/05/23 12:03 EDT.  German Garduno MD's signature on the note affirms that the note adequately documents the care provided.     Follow Up     No follow-ups on file.    Patient was given instructions and counseling regarding her condition. Please see specific information pulled into the AVS if appropriate.     German Garduno MD  10/09/2023

## 2023-10-09 ENCOUNTER — TELEPHONE (OUTPATIENT)
Dept: ONCOLOGY | Facility: CLINIC | Age: 43
End: 2023-10-09
Payer: COMMERCIAL

## 2023-10-09 ENCOUNTER — OFFICE VISIT (OUTPATIENT)
Dept: PLASTIC SURGERY | Facility: CLINIC | Age: 43
End: 2023-10-09
Payer: COMMERCIAL

## 2023-10-09 VITALS
OXYGEN SATURATION: 100 % | HEART RATE: 63 BPM | BODY MASS INDEX: 28.66 KG/M2 | HEIGHT: 60 IN | SYSTOLIC BLOOD PRESSURE: 128 MMHG | WEIGHT: 146 LBS | TEMPERATURE: 97.3 F | DIASTOLIC BLOOD PRESSURE: 78 MMHG

## 2023-10-09 DIAGNOSIS — N65.0 DEFORMITY AND DISPROPORTION OF RECONSTRUCTED BREAST: Primary | ICD-10-CM

## 2023-10-09 DIAGNOSIS — Z17.0 MALIGNANT NEOPLASM OF BREAST IN FEMALE, ESTROGEN RECEPTOR POSITIVE, UNSPECIFIED LATERALITY, UNSPECIFIED SITE OF BREAST: Primary | ICD-10-CM

## 2023-10-09 DIAGNOSIS — N65.1 DEFORMITY AND DISPROPORTION OF RECONSTRUCTED BREAST: Primary | ICD-10-CM

## 2023-10-09 DIAGNOSIS — C50.919 MALIGNANT NEOPLASM OF BREAST IN FEMALE, ESTROGEN RECEPTOR POSITIVE, UNSPECIFIED LATERALITY, UNSPECIFIED SITE OF BREAST: Primary | ICD-10-CM

## 2023-10-09 PROCEDURE — 99205 OFFICE O/P NEW HI 60 MIN: CPT | Performed by: SURGERY

## 2023-10-09 NOTE — TELEPHONE ENCOUNTER
Called the patient to let her know I placed an order for a new plastic surgeon (Tre) and that we would be faxing over records for her. She v/u.

## 2023-10-09 NOTE — TELEPHONE ENCOUNTER
Caller: Sravani Sky    Relationship: Self    Best call back number: [phone]      What was the call regarding: PT WAS REFERRED TO A PLASTIC SURGEON BUT THEY ARE A YEAR OUT FOR SCHEDULING SURGERY AND PATIENT WANTED TO KNOW IF THERE WAS ANYONE ELSE SHE COULD GO SEE

## 2023-10-10 ENCOUNTER — PREP FOR SURGERY (OUTPATIENT)
Dept: OTHER | Facility: HOSPITAL | Age: 43
End: 2023-10-10
Payer: COMMERCIAL

## 2023-10-10 DIAGNOSIS — N65.0 DEFORMITY AND DISPROPORTION OF RECONSTRUCTED BREAST: Primary | ICD-10-CM

## 2023-10-10 DIAGNOSIS — N65.1 DEFORMITY AND DISPROPORTION OF RECONSTRUCTED BREAST: Primary | ICD-10-CM

## 2023-10-10 RX ORDER — CEFAZOLIN SODIUM 2 G/100ML
2000 INJECTION, SOLUTION INTRAVENOUS ONCE
OUTPATIENT
Start: 2023-10-10 | End: 2023-10-10

## 2023-10-10 RX ORDER — ACETAMINOPHEN 500 MG
1000 TABLET ORAL ONCE
OUTPATIENT
Start: 2023-10-10 | End: 2023-10-10

## 2023-10-10 RX ORDER — SCOLOPAMINE TRANSDERMAL SYSTEM 1 MG/1
1 PATCH, EXTENDED RELEASE TRANSDERMAL CONTINUOUS
OUTPATIENT
Start: 2023-10-10 | End: 2023-10-13

## 2023-10-16 NOTE — PROGRESS NOTES
"Pre-op Exam (Pre op)            History of Present Illness  Sravani Sky is a 43 y.o. female who presents to [facility] PLASTIC & RECONSTRUCTIVE SURGERY for Pre-Operative Examination for   BREAST RECONSTRUCTION REVISION, bilateral breast reconstruction,  mesh placement. (I will use revolve, skinny) Bilateral General   FAT GRAFTING Bilateral General   implant replacement and repositioning     On 10/27/23      Subjective    Pt presents today for pre op.      Patient has no known allergies.  Allergies Reconciled.    Review of Systems   All review of system has been reviewed and it  is negative except the ones note above.     Objective     /82 (BP Location: Left arm, Patient Position: Sitting, Cuff Size: Adult)   Pulse 73   Temp 96.5 °F (35.8 °C) (Temporal)   Ht 152.4 cm (60\")   Wt 66.2 kg (146 lb)   SpO2 99%   BMI 28.51 kg/m²     Body mass index is 28.51 kg/m².    Physical Exam   Cardiovascular: Normal rate.     Pulmonary/Chest  Effort normal.       Result Review :                Assessment and Plan      Diagnoses and all orders for this visit:    1. Pre-operative examination (Primary)  -     acetaminophen (TYLENOL) 500 MG tablet; Take 2 tablets by mouth Every 6 (Six) Hours As Needed for Moderate Pain for up to 18 doses.  Dispense: 18 tablet; Refill: 0  -     ondansetron (Zofran) 4 MG tablet; Take 1 tablet by mouth Every 6 (Six) Hours As Needed for Nausea or Vomiting.  Dispense: 20 tablet; Refill: 0  -     cephalexin (KEFLEX) 500 MG capsule; Take 1 capsule by mouth 4 (Four) Times a Day for 5 days.  Dispense: 20 capsule; Refill: 0  -     gabapentin (Neurontin) 300 MG capsule; Take 1 capsule by mouth 3 (Three) Times a Day for 18 doses.  Dispense: 18 capsule; Refill: 0  -     naproxen (NAPROSYN) 250 MG tablet; Take 1 tablet by mouth 2 (Two) Times a Day As Needed for Mild Pain.  Dispense: 12 tablet; Refill: 0    2. Deformity and disproportion of reconstructed breast            Plan:  Given " these options, the patient has verbally expressed an understanding of the risks of surgery and finds these risks acceptable. We will proceed with surgery as soon as possible.    Medications sent to pharmacy      Scribed by Sophia Lieberman, acting as a scribe for German Garduno MD, 10/23/23 08:20 EDT.  German Garduno MD's signature on the note affirms that the note adequately documents the care provided.          Follow Up     Return RTC 1 week after surgery.    Patient was given instructions and counseling regarding her condition. Please see specific information pulled into the AVS if appropriate.     German Garduno MD  10/23/2023

## 2023-10-16 NOTE — H&P (VIEW-ONLY)
"Pre-op Exam (Pre op)            History of Present Illness  Sravani Sky is a 43 y.o. female who presents to [facility] PLASTIC & RECONSTRUCTIVE SURGERY for Pre-Operative Examination for   BREAST RECONSTRUCTION REVISION, bilateral breast reconstruction,  mesh placement. (I will use revolve, skinny) Bilateral General   FAT GRAFTING Bilateral General   implant replacement and repositioning     On 10/27/23      Subjective    Pt presents today for pre op.      Patient has no known allergies.  Allergies Reconciled.    Review of Systems   All review of system has been reviewed and it  is negative except the ones note above.     Objective     /82 (BP Location: Left arm, Patient Position: Sitting, Cuff Size: Adult)   Pulse 73   Temp 96.5 °F (35.8 °C) (Temporal)   Ht 152.4 cm (60\")   Wt 66.2 kg (146 lb)   SpO2 99%   BMI 28.51 kg/m²     Body mass index is 28.51 kg/m².    Physical Exam   Cardiovascular: Normal rate.     Pulmonary/Chest  Effort normal.       Result Review :                Assessment and Plan      Diagnoses and all orders for this visit:    1. Pre-operative examination (Primary)  -     acetaminophen (TYLENOL) 500 MG tablet; Take 2 tablets by mouth Every 6 (Six) Hours As Needed for Moderate Pain for up to 18 doses.  Dispense: 18 tablet; Refill: 0  -     ondansetron (Zofran) 4 MG tablet; Take 1 tablet by mouth Every 6 (Six) Hours As Needed for Nausea or Vomiting.  Dispense: 20 tablet; Refill: 0  -     cephalexin (KEFLEX) 500 MG capsule; Take 1 capsule by mouth 4 (Four) Times a Day for 5 days.  Dispense: 20 capsule; Refill: 0  -     gabapentin (Neurontin) 300 MG capsule; Take 1 capsule by mouth 3 (Three) Times a Day for 18 doses.  Dispense: 18 capsule; Refill: 0  -     naproxen (NAPROSYN) 250 MG tablet; Take 1 tablet by mouth 2 (Two) Times a Day As Needed for Mild Pain.  Dispense: 12 tablet; Refill: 0    2. Deformity and disproportion of reconstructed breast            Plan:  Given " these options, the patient has verbally expressed an understanding of the risks of surgery and finds these risks acceptable. We will proceed with surgery as soon as possible.    Medications sent to pharmacy      Scribed by Sophia Lieberman, acting as a scribe for German Garduno MD, 10/23/23 08:20 EDT.  German Garduno MD's signature on the note affirms that the note adequately documents the care provided.          Follow Up     Return RTC 1 week after surgery.    Patient was given instructions and counseling regarding her condition. Please see specific information pulled into the AVS if appropriate.     German Garduno MD  10/23/2023

## 2023-10-18 ENCOUNTER — PATIENT MESSAGE (OUTPATIENT)
Dept: PLASTIC SURGERY | Facility: CLINIC | Age: 43
End: 2023-10-18
Payer: COMMERCIAL

## 2023-10-18 ENCOUNTER — PATIENT ROUNDING (BHMG ONLY) (OUTPATIENT)
Dept: PLASTIC SURGERY | Facility: CLINIC | Age: 43
End: 2023-10-18
Payer: COMMERCIAL

## 2023-10-18 NOTE — PROGRESS NOTES
[employer] message has been sent to the patient for PATIENT ROUNDING with [facility].

## 2023-10-20 ENCOUNTER — TELEPHONE (OUTPATIENT)
Dept: ONCOLOGY | Facility: CLINIC | Age: 43
End: 2023-10-20
Payer: COMMERCIAL

## 2023-10-23 ENCOUNTER — LAB (OUTPATIENT)
Dept: LAB | Facility: HOSPITAL | Age: 43
End: 2023-10-23
Payer: COMMERCIAL

## 2023-10-23 ENCOUNTER — TELEPHONE (OUTPATIENT)
Dept: ONCOLOGY | Facility: CLINIC | Age: 43
End: 2023-10-23

## 2023-10-23 ENCOUNTER — OFFICE VISIT (OUTPATIENT)
Dept: PLASTIC SURGERY | Facility: CLINIC | Age: 43
End: 2023-10-23
Payer: COMMERCIAL

## 2023-10-23 VITALS
TEMPERATURE: 96.5 F | OXYGEN SATURATION: 99 % | DIASTOLIC BLOOD PRESSURE: 82 MMHG | HEIGHT: 60 IN | BODY MASS INDEX: 28.66 KG/M2 | HEART RATE: 73 BPM | SYSTOLIC BLOOD PRESSURE: 124 MMHG | WEIGHT: 146 LBS

## 2023-10-23 DIAGNOSIS — N65.0 DEFORMITY AND DISPROPORTION OF RECONSTRUCTED BREAST: ICD-10-CM

## 2023-10-23 DIAGNOSIS — N65.1 DEFORMITY AND DISPROPORTION OF RECONSTRUCTED BREAST: ICD-10-CM

## 2023-10-23 DIAGNOSIS — Z01.818 PRE-OPERATIVE EXAMINATION: Primary | ICD-10-CM

## 2023-10-23 PROCEDURE — 80305 DRUG TEST PRSMV DIR OPT OBS: CPT

## 2023-10-23 PROCEDURE — 99212 OFFICE O/P EST SF 10 MIN: CPT | Performed by: SURGERY

## 2023-10-23 RX ORDER — ACETAMINOPHEN 500 MG
1000 TABLET ORAL EVERY 6 HOURS PRN
Qty: 18 TABLET | Refills: 0 | Status: ON HOLD | OUTPATIENT
Start: 2023-10-23 | End: 2023-10-27 | Stop reason: SDUPTHER

## 2023-10-23 RX ORDER — GABAPENTIN 300 MG/1
300 CAPSULE ORAL 3 TIMES DAILY
Qty: 18 CAPSULE | Refills: 0 | Status: ON HOLD | OUTPATIENT
Start: 2023-10-23 | End: 2023-10-27 | Stop reason: SDUPTHER

## 2023-10-23 RX ORDER — NAPROXEN 250 MG/1
250 TABLET ORAL 2 TIMES DAILY PRN
Qty: 12 TABLET | Refills: 0 | Status: ON HOLD | OUTPATIENT
Start: 2023-10-23 | End: 2023-10-27 | Stop reason: SDUPTHER

## 2023-10-23 RX ORDER — ONDANSETRON 4 MG/1
4 TABLET, FILM COATED ORAL EVERY 6 HOURS PRN
Qty: 20 TABLET | Refills: 0 | Status: SHIPPED | OUTPATIENT
Start: 2023-10-23 | End: 2024-10-22

## 2023-10-23 RX ORDER — CEPHALEXIN 500 MG/1
500 CAPSULE ORAL 4 TIMES DAILY
Qty: 20 CAPSULE | Refills: 0 | Status: SHIPPED | OUTPATIENT
Start: 2023-10-23 | End: 2023-10-28

## 2023-10-23 NOTE — TELEPHONE ENCOUNTER
Caller: Sravani Sky    Relationship to patient: Self    Best call back number: [phone]    Chief complaint: PT CALLED TO CANCEL HER LAB FOR TODAY, DIDN'T WANT TO RESCHEDULE RIGHT NOW     Type of visit: LAB      If rescheduling, when is the original appointment: 10-23-23     Additional notes:PLEASE CANCEL FOR PATIENT, HUB UNABLE TO CANCEL FOR SAME DAY APPOINTMENTS

## 2023-10-24 LAB
COTININE UR QL SCN: NEGATIVE NG/ML
Lab: NORMAL

## 2023-10-26 NOTE — PRE-PROCEDURE INSTRUCTIONS
PRE-OP INSTRUCTIONS REVIEWED WITH PATIENT: FASTING/BATHING/ARRIVAL PROCEDURES.  INSTRUCTED TO TAKE A.M. DAY OF SURGERY: TYLENOL, GABAPENTIN, NAPROXEN  UNDERSTANDING VERBALIZED.

## 2023-10-27 ENCOUNTER — HOSPITAL ENCOUNTER (OUTPATIENT)
Facility: HOSPITAL | Age: 43
Setting detail: HOSPITAL OUTPATIENT SURGERY
Discharge: HOME | End: 2023-10-27
Attending: SURGERY | Admitting: SURGERY
Payer: COMMERCIAL

## 2023-10-27 ENCOUNTER — ANESTHESIA (OUTPATIENT)
Dept: PERIOP | Facility: HOSPITAL | Age: 43
End: 2023-10-27
Payer: COMMERCIAL

## 2023-10-27 ENCOUNTER — ANESTHESIA EVENT (OUTPATIENT)
Dept: PERIOP | Facility: HOSPITAL | Age: 43
End: 2023-10-27
Payer: COMMERCIAL

## 2023-10-27 VITALS
SYSTOLIC BLOOD PRESSURE: 114 MMHG | DIASTOLIC BLOOD PRESSURE: 78 MMHG | WEIGHT: 145.5 LBS | OXYGEN SATURATION: 96 % | HEIGHT: 60 IN | TEMPERATURE: 97.3 F | RESPIRATION RATE: 16 BRPM | BODY MASS INDEX: 28.57 KG/M2 | HEART RATE: 83 BPM

## 2023-10-27 DIAGNOSIS — N65.0 DEFORMITY AND DISPROPORTION OF RECONSTRUCTED BREAST: ICD-10-CM

## 2023-10-27 DIAGNOSIS — N65.1 DEFORMITY AND DISPROPORTION OF RECONSTRUCTED BREAST: Primary | ICD-10-CM

## 2023-10-27 DIAGNOSIS — N65.0 DEFORMITY AND DISPROPORTION OF RECONSTRUCTED BREAST: Primary | ICD-10-CM

## 2023-10-27 DIAGNOSIS — N65.1 DEFORMITY AND DISPROPORTION OF RECONSTRUCTED BREAST: ICD-10-CM

## 2023-10-27 DIAGNOSIS — Z01.818 PRE-OPERATIVE EXAMINATION: ICD-10-CM

## 2023-10-27 PROCEDURE — 25010000002 SUGAMMADEX 200 MG/2ML SOLUTION

## 2023-10-27 PROCEDURE — 19340 INSJ BREAST IMPLT SM D MAST: CPT | Performed by: SURGERY

## 2023-10-27 PROCEDURE — 25010000002 DIPHENHYDRAMINE PER 50 MG

## 2023-10-27 PROCEDURE — 25010000002 MIDAZOLAM PER 1MG: Performed by: ANESTHESIOLOGY

## 2023-10-27 PROCEDURE — 19370 REVJ PERI-IMPLT CAPSULE BRST: CPT | Performed by: SURGERY

## 2023-10-27 PROCEDURE — 25810000003 LACTATED RINGERS PER 1000 ML: Performed by: ANESTHESIOLOGY

## 2023-10-27 PROCEDURE — 25010000002 DEXAMETHASONE PER 1 MG: Performed by: SURGERY

## 2023-10-27 PROCEDURE — 15771 GRFG AUTOL FAT LIPO 50 CC/<: CPT | Performed by: SURGERY

## 2023-10-27 PROCEDURE — 25010000002 CEFAZOLIN IN DEXTROSE 2000 MG/ 100 ML SOLUTION: Performed by: SURGERY

## 2023-10-27 PROCEDURE — C1789 PROSTHESIS, BREAST, IMP: HCPCS | Performed by: SURGERY

## 2023-10-27 PROCEDURE — 25010000002 PROPOFOL 10 MG/ML EMULSION

## 2023-10-27 PROCEDURE — 25010000002 DEXAMETHASONE PER 1 MG

## 2023-10-27 PROCEDURE — 25010000002 FENTANYL CITRATE (PF) 50 MCG/ML SOLUTION

## 2023-10-27 PROCEDURE — 25010000002 ONDANSETRON PER 1 MG

## 2023-10-27 PROCEDURE — 15777 ACELLULAR DERM MATRIX IMPLT: CPT | Performed by: SURGERY

## 2023-10-27 PROCEDURE — 25010000002 BUPIVACAINE (PF) 0.5 % SOLUTION: Performed by: SURGERY

## 2023-10-27 PROCEDURE — 25010000002 EPINEPHRINE (ANAPHYLAXIS) 1 MG/ML SOLUTION: Performed by: SURGERY

## 2023-10-27 PROCEDURE — 25010000002 HYDROMORPHONE 1 MG/ML SOLUTION

## 2023-10-27 PROCEDURE — 15772 GRFG AUTOL FAT LIPO EA ADDL: CPT | Performed by: SURGERY

## 2023-10-27 DEVICE — IMPLANTABLE DEVICE: Type: IMPLANTABLE DEVICE | Site: BREAST | Status: FUNCTIONAL

## 2023-10-27 DEVICE — GRFT TISS ALLODERM RTM PERF 16X20CM 2.4MM/THK .4MM: Type: IMPLANTABLE DEVICE | Site: BREAST | Status: FUNCTIONAL

## 2023-10-27 RX ORDER — SCOLOPAMINE TRANSDERMAL SYSTEM 1 MG/1
1 PATCH, EXTENDED RELEASE TRANSDERMAL ONCE
Status: DISCONTINUED | OUTPATIENT
Start: 2023-10-27 | End: 2023-10-27

## 2023-10-27 RX ORDER — PROPOFOL 10 MG/ML
VIAL (ML) INTRAVENOUS AS NEEDED
Status: DISCONTINUED | OUTPATIENT
Start: 2023-10-27 | End: 2023-10-27 | Stop reason: SURG

## 2023-10-27 RX ORDER — EPHEDRINE SULFATE 50 MG/ML
INJECTION, SOLUTION INTRAVENOUS AS NEEDED
Status: DISCONTINUED | OUTPATIENT
Start: 2023-10-27 | End: 2023-10-27 | Stop reason: SURG

## 2023-10-27 RX ORDER — ONDANSETRON 2 MG/ML
4 INJECTION INTRAMUSCULAR; INTRAVENOUS ONCE AS NEEDED
Status: DISCONTINUED | OUTPATIENT
Start: 2023-10-27 | End: 2023-10-27 | Stop reason: HOSPADM

## 2023-10-27 RX ORDER — ACETAMINOPHEN 500 MG
1000 TABLET ORAL EVERY 6 HOURS PRN
Qty: 18 TABLET | Refills: 0 | Status: SHIPPED | OUTPATIENT
Start: 2023-10-27 | End: 2023-10-30

## 2023-10-27 RX ORDER — DIPHENHYDRAMINE HYDROCHLORIDE 50 MG/ML
INJECTION INTRAMUSCULAR; INTRAVENOUS AS NEEDED
Status: DISCONTINUED | OUTPATIENT
Start: 2023-10-27 | End: 2023-10-27 | Stop reason: SURG

## 2023-10-27 RX ORDER — ONDANSETRON 2 MG/ML
INJECTION INTRAMUSCULAR; INTRAVENOUS AS NEEDED
Status: DISCONTINUED | OUTPATIENT
Start: 2023-10-27 | End: 2023-10-27 | Stop reason: SURG

## 2023-10-27 RX ORDER — DEXAMETHASONE SODIUM PHOSPHATE 4 MG/ML
INJECTION, SOLUTION INTRA-ARTICULAR; INTRALESIONAL; INTRAMUSCULAR; INTRAVENOUS; SOFT TISSUE AS NEEDED
Status: DISCONTINUED | OUTPATIENT
Start: 2023-10-27 | End: 2023-10-27 | Stop reason: HOSPADM

## 2023-10-27 RX ORDER — EPINEPHRINE 1 MG/ML
INJECTION, SOLUTION INTRAMUSCULAR; SUBCUTANEOUS AS NEEDED
Status: DISCONTINUED | OUTPATIENT
Start: 2023-10-27 | End: 2023-10-27 | Stop reason: HOSPADM

## 2023-10-27 RX ORDER — OXYCODONE HYDROCHLORIDE 5 MG/1
5 TABLET ORAL
Status: COMPLETED | OUTPATIENT
Start: 2023-10-27 | End: 2023-10-27

## 2023-10-27 RX ORDER — SODIUM CHLORIDE, SODIUM LACTATE, POTASSIUM CHLORIDE, AND CALCIUM CHLORIDE .6; .31; .03; .02 G/100ML; G/100ML; G/100ML; G/100ML
INJECTION, SOLUTION INTRAVENOUS AS NEEDED
Status: DISCONTINUED | OUTPATIENT
Start: 2023-10-27 | End: 2023-10-27 | Stop reason: HOSPADM

## 2023-10-27 RX ORDER — PROMETHAZINE HYDROCHLORIDE 12.5 MG/1
25 TABLET ORAL ONCE AS NEEDED
Status: DISCONTINUED | OUTPATIENT
Start: 2023-10-27 | End: 2023-10-27 | Stop reason: HOSPADM

## 2023-10-27 RX ORDER — OXYCODONE HYDROCHLORIDE AND ACETAMINOPHEN 5; 325 MG/1; MG/1
1 TABLET ORAL EVERY 6 HOURS PRN
Qty: 30 TABLET | Refills: 0 | Status: SHIPPED | OUTPATIENT
Start: 2023-10-27

## 2023-10-27 RX ORDER — DEXAMETHASONE SODIUM PHOSPHATE 4 MG/ML
INJECTION, SOLUTION INTRA-ARTICULAR; INTRALESIONAL; INTRAMUSCULAR; INTRAVENOUS; SOFT TISSUE AS NEEDED
Status: DISCONTINUED | OUTPATIENT
Start: 2023-10-27 | End: 2023-10-27 | Stop reason: SURG

## 2023-10-27 RX ORDER — SODIUM CHLORIDE, SODIUM LACTATE, POTASSIUM CHLORIDE, CALCIUM CHLORIDE 600; 310; 30; 20 MG/100ML; MG/100ML; MG/100ML; MG/100ML
9 INJECTION, SOLUTION INTRAVENOUS CONTINUOUS PRN
Status: DISCONTINUED | OUTPATIENT
Start: 2023-10-27 | End: 2023-10-27 | Stop reason: HOSPADM

## 2023-10-27 RX ORDER — DEXMEDETOMIDINE HYDROCHLORIDE 100 UG/ML
INJECTION, SOLUTION INTRAVENOUS AS NEEDED
Status: DISCONTINUED | OUTPATIENT
Start: 2023-10-27 | End: 2023-10-27 | Stop reason: SURG

## 2023-10-27 RX ORDER — FENTANYL CITRATE 50 UG/ML
INJECTION, SOLUTION INTRAMUSCULAR; INTRAVENOUS AS NEEDED
Status: DISCONTINUED | OUTPATIENT
Start: 2023-10-27 | End: 2023-10-27 | Stop reason: SURG

## 2023-10-27 RX ORDER — ACETAMINOPHEN 500 MG
1000 TABLET ORAL ONCE
Status: DISCONTINUED | OUTPATIENT
Start: 2023-10-27 | End: 2023-10-27

## 2023-10-27 RX ORDER — NAPROXEN 250 MG/1
250 TABLET ORAL 2 TIMES DAILY PRN
Qty: 12 TABLET | Refills: 0 | Status: SHIPPED | OUTPATIENT
Start: 2023-10-27 | End: 2023-10-30

## 2023-10-27 RX ORDER — ACETAMINOPHEN 500 MG
1000 TABLET ORAL ONCE
Status: COMPLETED | OUTPATIENT
Start: 2023-10-27 | End: 2023-10-27

## 2023-10-27 RX ORDER — CEFAZOLIN SODIUM 2 G/100ML
2000 INJECTION, SOLUTION INTRAVENOUS ONCE
Status: COMPLETED | OUTPATIENT
Start: 2023-10-27 | End: 2023-10-27

## 2023-10-27 RX ORDER — GABAPENTIN 300 MG/1
300 CAPSULE ORAL 3 TIMES DAILY
Qty: 18 CAPSULE | Refills: 0 | Status: SHIPPED | OUTPATIENT
Start: 2023-10-27 | End: 2023-10-30

## 2023-10-27 RX ORDER — LIDOCAINE HYDROCHLORIDE 20 MG/ML
INJECTION, SOLUTION EPIDURAL; INFILTRATION; INTRACAUDAL; PERINEURAL AS NEEDED
Status: DISCONTINUED | OUTPATIENT
Start: 2023-10-27 | End: 2023-10-27 | Stop reason: SURG

## 2023-10-27 RX ORDER — SCOLOPAMINE TRANSDERMAL SYSTEM 1 MG/1
1 PATCH, EXTENDED RELEASE TRANSDERMAL CONTINUOUS
Status: DISCONTINUED | OUTPATIENT
Start: 2023-10-27 | End: 2023-10-27 | Stop reason: HOSPADM

## 2023-10-27 RX ORDER — OXYCODONE HYDROCHLORIDE AND ACETAMINOPHEN 5; 325 MG/1; MG/1
1 TABLET ORAL EVERY 6 HOURS PRN
Qty: 30 TABLET | Refills: 0 | Status: SHIPPED | OUTPATIENT
Start: 2023-10-27 | End: 2023-10-27 | Stop reason: SDUPTHER

## 2023-10-27 RX ORDER — MEPERIDINE HYDROCHLORIDE 25 MG/ML
12.5 INJECTION INTRAMUSCULAR; INTRAVENOUS; SUBCUTANEOUS
Status: DISCONTINUED | OUTPATIENT
Start: 2023-10-27 | End: 2023-10-27 | Stop reason: HOSPADM

## 2023-10-27 RX ORDER — PROMETHAZINE HYDROCHLORIDE 25 MG/1
25 SUPPOSITORY RECTAL ONCE AS NEEDED
Status: DISCONTINUED | OUTPATIENT
Start: 2023-10-27 | End: 2023-10-27 | Stop reason: HOSPADM

## 2023-10-27 RX ORDER — ROCURONIUM BROMIDE 10 MG/ML
INJECTION, SOLUTION INTRAVENOUS AS NEEDED
Status: DISCONTINUED | OUTPATIENT
Start: 2023-10-27 | End: 2023-10-27 | Stop reason: SURG

## 2023-10-27 RX ORDER — MIDAZOLAM HYDROCHLORIDE 2 MG/2ML
2 INJECTION, SOLUTION INTRAMUSCULAR; INTRAVENOUS ONCE
Status: COMPLETED | OUTPATIENT
Start: 2023-10-27 | End: 2023-10-27

## 2023-10-27 RX ORDER — BUPIVACAINE HYDROCHLORIDE 5 MG/ML
INJECTION, SOLUTION EPIDURAL; INTRACAUDAL AS NEEDED
Status: DISCONTINUED | OUTPATIENT
Start: 2023-10-27 | End: 2023-10-27 | Stop reason: HOSPADM

## 2023-10-27 RX ADMIN — MIDAZOLAM HYDROCHLORIDE 2 MG: 1 INJECTION, SOLUTION INTRAMUSCULAR; INTRAVENOUS at 11:15

## 2023-10-27 RX ADMIN — OXYCODONE HYDROCHLORIDE 5 MG: 5 TABLET ORAL at 15:44

## 2023-10-27 RX ADMIN — SUGAMMADEX 200 MG: 100 INJECTION, SOLUTION INTRAVENOUS at 14:02

## 2023-10-27 RX ADMIN — DEXAMETHASONE SODIUM PHOSPHATE 4 MG: 4 INJECTION, SOLUTION INTRAMUSCULAR; INTRAVENOUS at 12:02

## 2023-10-27 RX ADMIN — EPHEDRINE SULFATE 5 MG: 50 INJECTION INTRAVENOUS at 13:16

## 2023-10-27 RX ADMIN — ROCURONIUM BROMIDE 20 MG: 50 INJECTION INTRAVENOUS at 12:32

## 2023-10-27 RX ADMIN — DIPHENHYDRAMINE HYDROCHLORIDE 12.5 MG: 50 INJECTION, SOLUTION INTRAMUSCULAR; INTRAVENOUS at 13:21

## 2023-10-27 RX ADMIN — OXYCODONE HYDROCHLORIDE 5 MG: 5 TABLET ORAL at 14:25

## 2023-10-27 RX ADMIN — FENTANYL CITRATE 25 MCG: 50 INJECTION, SOLUTION INTRAMUSCULAR; INTRAVENOUS at 11:28

## 2023-10-27 RX ADMIN — SODIUM CHLORIDE, POTASSIUM CHLORIDE, SODIUM LACTATE AND CALCIUM CHLORIDE: 600; 310; 30; 20 INJECTION, SOLUTION INTRAVENOUS at 13:08

## 2023-10-27 RX ADMIN — DEXMEDETOMIDINE 10 MCG: 100 INJECTION, SOLUTION INTRAVENOUS at 13:03

## 2023-10-27 RX ADMIN — SCOPALAMINE 1 PATCH: 1 PATCH, EXTENDED RELEASE TRANSDERMAL at 10:55

## 2023-10-27 RX ADMIN — CEFAZOLIN SODIUM 2000 MG: 2 INJECTION, SOLUTION INTRAVENOUS at 11:31

## 2023-10-27 RX ADMIN — EPHEDRINE SULFATE 5 MG: 50 INJECTION INTRAVENOUS at 12:12

## 2023-10-27 RX ADMIN — EPHEDRINE SULFATE 5 MG: 50 INJECTION INTRAVENOUS at 13:47

## 2023-10-27 RX ADMIN — ACETAMINOPHEN 1000 MG: 500 TABLET ORAL at 10:51

## 2023-10-27 RX ADMIN — ROCURONIUM BROMIDE 50 MG: 50 INJECTION INTRAVENOUS at 11:28

## 2023-10-27 RX ADMIN — PROPOFOL 130 MG: 10 INJECTION, EMULSION INTRAVENOUS at 11:28

## 2023-10-27 RX ADMIN — SODIUM CHLORIDE, POTASSIUM CHLORIDE, SODIUM LACTATE AND CALCIUM CHLORIDE 9 ML/HR: 600; 310; 30; 20 INJECTION, SOLUTION INTRAVENOUS at 10:51

## 2023-10-27 RX ADMIN — ONDANSETRON 4 MG: 2 INJECTION INTRAMUSCULAR; INTRAVENOUS at 13:42

## 2023-10-27 RX ADMIN — LIDOCAINE HYDROCHLORIDE 100 MG: 20 INJECTION, SOLUTION EPIDURAL; INFILTRATION; INTRACAUDAL; PERINEURAL at 11:28

## 2023-10-27 RX ADMIN — ROCURONIUM BROMIDE 10 MG: 50 INJECTION INTRAVENOUS at 13:13

## 2023-10-27 RX ADMIN — FENTANYL CITRATE 75 MCG: 50 INJECTION, SOLUTION INTRAMUSCULAR; INTRAVENOUS at 11:47

## 2023-10-27 RX ADMIN — HYDROMORPHONE HYDROCHLORIDE 0.5 MG: 1 INJECTION, SOLUTION INTRAMUSCULAR; INTRAVENOUS; SUBCUTANEOUS at 14:25

## 2023-10-27 NOTE — DISCHARGE INSTRUCTIONS
Ok for regular diet  Do not drive for next 2 weeks  Ok to shower in 3 days from waist down.   Keep breast dressings intact until follow up   Strip drains q 8 hours and record drain output daily. Wash hands or wear gloves when manipulating drains.  Do not exercise for the next 6 weeks.  Sleep in an upright position  No bra for 1 week. After that, wear a comfortable soft bra  Ok to move arms slowly to the shoulder level (brushing hair movement)  Do not fly for 2 months    Take post-operative medications as directed on the bottle.     If you experience any issues or concerns, please contact the office at [phone]. If after hours a call service will notify the on-call provider.

## 2023-10-27 NOTE — OP NOTE
Plastic Surgery Op Note    BILATERAL BREAST RECONSTRUCTION REVISION WITH PARTIAL CAPSULOTOMY, IMPLANT EXPLANTATION, MUSCLE REPOSITIONING, FAT GRAFT AND NEW IMPLANT PLACEMENT IN THE PRE PECTORAL POSITION, RIGHT BREAST INCISIONAL VACUUM DRESSING PLACEMENT.     Sravani Sky  10/27/2023    Pre-op Diagnosis:   Deformity and disproportion of reconstructed breast [N65.0, N65.1]    Post-Op Diagnosis Codes:     * Deformity and disproportion of reconstructed breast [N65.0, N65.1]    Anesthesia: General    Staff:   Circulator: Joseph Torres RN; Keiry Villarreal RN; Shawnee Brennan RN  Scrub Person: Jennifer Espinoza  Assistant: Jaky Sierra RN CSA  Other: Joyce Valdez APRN    Surgeon: Dr Garduno  First Assistant: CECILIA Nix who was instrumental in helping with hemostasis, visualization and retraction structures as well as surgical closure.  Her skilled assistance was necessary for the success of this case.      Estimated Blood Loss: 100 mL    Specimens:   Order Name Source Comment Collection Info Order Time   TISSUE PATHOLOGY EXAM Breast, Left FOR GROSS EXAMINATION ONLY    MENTOR  [id number]  M+275CC Collected By: German Garduno MD 10/27/2023  1:24 PM     Release to patient   Routine Release              Drains:   Closed/Suction Drain 1 Inferior;Lateral;Right Breast Bulb 15 Fr. (Active)   Dressing Status Clean;Dry;Intact 10/27/23 1401       Closed/Suction Drain 2 Inferior;Lateral;Left Breast Bulb 15 Fr. (Active)   Dressing Status Clean;Dry;Intact 10/27/23 1402       [REMOVED] Closed/Suction Drain 1 Right Breast Bulb 15 Fr. (Removed)       Findings:     Both breast implants were saline and were subpect, however the muscle was covering less than 30% of the implants. On the right, there was severe radiation injury to the muscle and chest wall and severe capsular contracture with very thin flaps and partial non incorporated mesh   On the left, there was capsular contracture but not as severe as the  right. There was some non incorporated mesh   There was no fluid in the pockets  The saline implants were intact     Fat graft: 300 cc, right breast grafted more then the left       Implants:   Explanted:  Right:   [place] saline implant 250cc    Left:  [place] saline implant 275cc      Implanted:    Right:   Sientra smooth round gel implant [id number]     Alloderm select perforated rectangular 16x20 cm   Left:  Sientra smooth round gel implant [id number]     Alloderm select perforated rectangular 16x20 cm     Both implants were wrapped 360 degrees     Complications: none     After risks and benefits were discussed with patient and informed consent was signed, patient was taken to the OR and positioned supine. The surgical site was then prepped in a sterile fashion way and a time out was performed confirming patient's name and procedure to be performed. All surgical team was introduced by name.   I started with tumescent injection over the abdomen and with a cannula #4, lipoaspirate was obtained and fat was trapped on a fat graft system. While the fat was being processed, I proceeded with the remaining of the surgery.  Then, I incised both breasts and removed the old implants. The muscle was elevated off the mastectomy flaps and sutured back on the chest wall. A bilateral partial capsulotomy was performed. Then, a sizer was placed on the breasts, fat graft was performed, then a drain was placed. Nerve blocks were performed and the cavities were irrigated with saline followed by irrisept. The implants were wrapped bilaterally with alloderm and placed in the breast cavity. The mesh was fixed in the muscle. The incisions were closed with 2 layers on the right ( bc of lacking of soft tissue thickness) and 3 layers on the left with deep PDS followed by 3-0 vicryl of 4-0 monocryl. An incisional vacuum dressing was applied to the right. The left breast incision was closed with Aquacel.      Patient tolerated procedure  well, there were no complications, all instruments were checked and patient was awakened and taken to PACU in stable condition.  I was present for the entire procedure.     Date: 10/27/2023  Time: 14:55 EDT           German Garduno MD  10/27/23  14:55 EDT

## 2023-10-27 NOTE — PROGRESS NOTES
"Post-op Follow-up (1.5 week post op)            History of Present Illness  Sravani Sky is a 43 y.o. female who presents to [facility] PLASTIC & RECONSTRUCTIVE SURGERY as a post op follow up on surgery  BREAST RECONSTRUCTION REVISION,  IMPLANT REPLACEMENT AND REPOSITIONING, MESH PLACEMENT, FAT GRAFTING and skinny 10/27/23.   Subjective      Pt presents today for 1.5 week post op. Doing well. Pain 6/10 in abd area would like refill of gabapentin.  Pt has 2 drains intact with output of-  Right 25/17.5/17.5  Left 7.5/2.5/2.5    Pt does have skinny on right breast.    Gluten meal  Allergies Reconciled.    Review of Systems   All review of system has been reviewed and it  is negative except the ones note above.     Objective     /76 (BP Location: Left arm, Patient Position: Sitting, Cuff Size: Adult)   Pulse 70   Temp 97 °F (36.1 °C) (Temporal)   Ht 152.4 cm (60\")   Wt 66.7 kg (147 lb)   SpO2 98%   BMI 28.71 kg/m²     Body mass index is 28.71 kg/m².    Physical Exam   Cardiovascular: Normal rate.     Pulmonary/Chest  Effort normal.     Incisions c/d/I     Result Review :              Assessment and Plan      Diagnoses and all orders for this visit:    1. 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 Deformity and disproportion of reconstructed breast        Additional Order(s):       Plan:  Patient doing well, right breast skin still very flat. I expect that to become better over the course of the months since I fat grafted it. I re applied the incisional vacuum dressing and it will be discontinued next week.     I spent 15 minutes caring for Sravani on this date of service. This time includes time spent by me in the following activities:performing a medically appropriate examination and/or evaluation , counseling and educating the patient/family/caregiver, documenting information in the medical record, and care " coordination    Follow Up     No follow-ups on file.    Patient was given instructions and counseling regarding her condition. Please see specific information pulled into the AVS if appropriate.     German Garduno MD  11/06/2023

## 2023-10-27 NOTE — ANESTHESIA PREPROCEDURE EVALUATION
Anesthesia Evaluation     Patient summary reviewed and Nursing notes reviewed   no history of anesthetic complications:   NPO Solid Status: > 8 hours  NPO Liquid Status: > 2 hours           Airway   Mallampati: II  TM distance: >3 FB  Neck ROM: full  No difficulty expected  Dental      Pulmonary - negative pulmonary ROS and normal exam    breath sounds clear to auscultation  Cardiovascular - negative cardio ROS and normal exam  Exercise tolerance: good (4-7 METS)    Rhythm: regular  Rate: normal        Neuro/Psych- negative ROS  GI/Hepatic/Renal/Endo    (+) liver disease fatty liver disease    Musculoskeletal     Abdominal    Substance History      OB/GYN          Other      history of cancer remission    ROS/Med Hx Other: PAT Nursing Notes unavailable.               Anesthesia Plan    ASA 2     general     (Patient understands anesthesia not responsible for dental damage.)  intravenous induction     Anesthetic plan, risks, benefits, and alternatives have been provided, discussed and informed consent has been obtained with: patient and spouse/significant other.    Plan discussed with CRNA.    CODE STATUS:

## 2023-10-27 NOTE — INTERVAL H&P NOTE
H&P reviewed. The patient was examined and there are no changes to the H&P.    Patient is not tachycardic  Respirations are non elaborated  Vitals:    10/27/23 1013   BP: 116/87   Pulse: 66   Resp: 16   SpO2: 100%     Risks and benefits reminded to patient who agrees to proceed

## 2023-10-27 NOTE — ANESTHESIA POSTPROCEDURE EVALUATION
Patient: Sravani Sky    Procedure Summary       Date: 10/27/23 Room / Location: [facility] OR  / [facility]    Anesthesia Start: 1121 Anesthesia Stop: 1417    Procedure: BREAST RECONSTRUCTION REVISION,  IMPLANT REPLACEMENT AND REPOSITIONING, MESH PLACEMENT, FAT GRAFTING,  (I will use revolve, skinny) (Bilateral: Breast) Diagnosis:       Deformity and disproportion of reconstructed breast      (Deformity and disproportion of reconstructed breast [N65.0, N65.1])    Surgeons: German Garduno MD Provider: Rowdy Torre MD    Anesthesia Type: general ASA Status: 2            Anesthesia Type: general    Vitals  Vitals Value Taken Time   /80 10/27/23 1446   Temp 36.2 °C (97.1 °F) 10/27/23 1415   Pulse 65 10/27/23 1456   Resp 14 10/27/23 1415   SpO2 100 % 10/27/23 1456   Vitals shown include unfiled device data.        Post Anesthesia Care and Evaluation    Patient location during evaluation: bedside  Patient participation: complete - patient participated  Level of consciousness: awake  Pain management: adequate    Airway patency: patent  PONV Status: none  Cardiovascular status: acceptable and stable  Respiratory status: acceptable  Hydration status: acceptable    Comments: An Anesthesiologist personally participated in the most demanding procedures (including induction and emergence if applicable) in the anesthesia plan, monitored the course of anesthesia administration at frequent intervals and remained physically present and available for immediate diagnosis and treatment of emergencies.

## 2023-10-29 DIAGNOSIS — Z01.818 PRE-OPERATIVE EXAMINATION: ICD-10-CM

## 2023-10-30 RX ORDER — GABAPENTIN 300 MG/1
300 CAPSULE ORAL 3 TIMES DAILY
Qty: 18 CAPSULE | Refills: 0 | Status: SHIPPED | OUTPATIENT
Start: 2023-10-30

## 2023-10-30 RX ORDER — PSEUDOEPHED/ACETAMINOPH/DIPHEN 30MG-500MG
TABLET ORAL
Qty: 18 TABLET | Refills: 0 | Status: SHIPPED | OUTPATIENT
Start: 2023-10-30

## 2023-10-30 RX ORDER — NAPROXEN 250 MG/1
TABLET ORAL
Qty: 12 TABLET | Refills: 0 | Status: SHIPPED | OUTPATIENT
Start: 2023-10-30

## 2023-11-02 ENCOUNTER — TELEPHONE (OUTPATIENT)
Dept: PLASTIC SURGERY | Facility: CLINIC | Age: 43
End: 2023-11-02

## 2023-11-02 LAB
LAB AP CASE REPORT: NORMAL
LAB AP CLINICAL INFORMATION: NORMAL
PATH REPORT.FINAL DX SPEC: NORMAL
PATH REPORT.GROSS SPEC: NORMAL

## 2023-11-02 NOTE — TELEPHONE ENCOUNTER
Patient called to request a refill on Gabapentin and Percocet.  States she will run out over weekend.  She is still having breakthrough pain with Percocet every 6 hours.  Nervous about running out over weekend.  Please advise  Pt # [phone]  [facility] on [address]

## 2023-11-06 ENCOUNTER — OFFICE VISIT (OUTPATIENT)
Dept: PLASTIC SURGERY | Facility: CLINIC | Age: 43
End: 2023-11-06
Payer: COMMERCIAL

## 2023-11-06 VITALS
DIASTOLIC BLOOD PRESSURE: 76 MMHG | OXYGEN SATURATION: 98 % | HEART RATE: 70 BPM | WEIGHT: 147 LBS | TEMPERATURE: 97 F | HEIGHT: 60 IN | BODY MASS INDEX: 28.86 KG/M2 | SYSTOLIC BLOOD PRESSURE: 116 MMHG

## 2023-11-06 DIAGNOSIS — C50.011 BILATERAL MALIGNANT NEOPLASM INVOLVING BOTH NIPPLE AND AREOLA IN FEMALE, UNSPECIFIED ESTROGEN RECEPTOR STATUS: Primary | ICD-10-CM

## 2023-11-06 DIAGNOSIS — N65.0 DEFORMITY AND DISPROPORTION OF RECONSTRUCTED BREAST: ICD-10-CM

## 2023-11-06 DIAGNOSIS — N65.1 DEFORMITY AND DISPROPORTION OF RECONSTRUCTED BREAST: ICD-10-CM

## 2023-11-06 DIAGNOSIS — C50.012 BILATERAL MALIGNANT NEOPLASM INVOLVING BOTH NIPPLE AND AREOLA IN FEMALE, UNSPECIFIED ESTROGEN RECEPTOR STATUS: Primary | ICD-10-CM

## 2023-11-06 PROCEDURE — 99024 POSTOP FOLLOW-UP VISIT: CPT | Performed by: SURGERY

## 2023-11-08 ENCOUNTER — TELEPHONE (OUTPATIENT)
Dept: PLASTIC SURGERY | Facility: CLINIC | Age: 43
End: 2023-11-08
Payer: COMMERCIAL

## 2023-11-08 NOTE — TELEPHONE ENCOUNTER
Rcvd call from pt inquiring if drain could be pulled earlier than Monday due to hardley any drainage.  Pt also has a skinny vac on that was put on Monday advised pt she would need to keep her appt on 11/13 due to skinny would need to stay on.

## 2023-11-28 NOTE — PROGRESS NOTES
"Post-op Follow-up (1 month post op)            History of Present Illness  Sravani Sky is a 43 y.o. female who presents to [facility]GERY as a post op follow up on surgery  BREAST RECONSTRUCTION REVISION,  IMPLANT REPLACEMENT AND REPOSITIONING, MESH PLACEMENT, FAT GRAFTING and skinny 10/27/23.   Subjective      Pt presents today for 1m post op. Pt states she still sensitive on rt side where she was fat grafted.  She states left side has healed better and rt is radiated side. She states she developed a rash, blister like from rt armpit to shoulder blade.          Gluten meal  Allergies Reconciled.    Review of Systems   All review of system has been reviewed and it  is negative except the ones note above.     Objective     /80 (BP Location: Left arm, Patient Position: Sitting, Cuff Size: Adult)   Pulse 82   Temp 97.3 °F (36.3 °C) (Temporal)   Ht 152.4 cm (60\")   Wt 63.5 kg (140 lb)   SpO2 98%   BMI 27.34 kg/m²     Body mass index is 27.34 kg/m².    Physical Exam   Cardiovascular: Normal rate.     Pulmonary/Chest  Effort normal.     Incisions c/d/I     Result Review :              Assessment and Plan      Diagnoses and all orders for this visit:    1. 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 She feels better however I suggest to schedule further revision with fat graft and implant exchange for a larger one with wider base and projection. We will wait at least 6 months for the surgery  Consent: bilateral breast reconstruction with implant exchange and fat graft.   19380-50 revision of reconstructed breast  19342-50 - insertion of breast implant on a separate day from mastectomy   Implant target size:  Sientra 505    OR supplies:  Implants Sientra:  Sientra Smooth round gel implant   90541-823, 440, 470, 505, 535, 565 x2  Expander: " [id number] x2  Single use sizer [id number] x1  Revolve   No mesh          Follow Up     No follow-ups on file.    Patient was given instructions and counseling regarding her condition. Please see specific information pulled into the AVS if appropriate.     German Garduno MD  12/11/2023

## 2023-11-30 ENCOUNTER — OFFICE VISIT (OUTPATIENT)
Dept: GASTROENTEROLOGY | Facility: CLINIC | Age: 43
End: 2023-11-30
Payer: COMMERCIAL

## 2023-11-30 VITALS
WEIGHT: 139 LBS | HEIGHT: 60 IN | SYSTOLIC BLOOD PRESSURE: 122 MMHG | HEART RATE: 83 BPM | TEMPERATURE: 96.5 F | OXYGEN SATURATION: 94 % | BODY MASS INDEX: 27.29 KG/M2 | DIASTOLIC BLOOD PRESSURE: 85 MMHG

## 2023-11-30 DIAGNOSIS — K90.0 CELIAC DISEASE: Primary | ICD-10-CM

## 2023-11-30 DIAGNOSIS — R74.01 ELEVATED TRANSAMINASE LEVEL: ICD-10-CM

## 2023-11-30 DIAGNOSIS — K76.0 FATTY LIVER: ICD-10-CM

## 2023-11-30 NOTE — PROGRESS NOTES
"Chief Complaint  Celiac Disease    Subjective          History of Present Illness    Sravani Sky is a  43 y.o. female presents for follow-up on celiac disease, history of iron deficiency anemia, and elevated liver enzymes and fatty liver.  She is a patient of Dr. Sales last seen on 11/11/2022.  She is new to me.    She is following strict gluten free diet. She has occasional diarrhea with exposure to gluten. Denies heartburn, reflux, melena, hematochezia, abnormal weight loss.  Celiac disease diagnosed on EGD on 8/18/2021 with positive small bowel biopsies    She does have a history of fatty liver and mildly elevated transaminases, 9/15/2023 ALT showed worsening elevation at 101, AST was 71, previous to that she had normal LFTs for couple years.  No evidence of iron deficiency on CBC.    She has history of lymphocytic colitis diagnosed in 2019, treated with budesonide previously--8/18/2021 colonoscopy showed sessile serrated adenoma but no evidence of inflammation or lymphocytic colitis.  Recall colonoscopy 5 years.    She has history of bilateral breast cancer diagnosed 10 years ago and BRCA1 positivity currently off antihormonal therapy after 7 years of treatment (on Arimidex for the last 5 years and  2 year of tamoxifen).  She follows with oncology for this.    Objective   Vital Signs:   /85   Pulse 83   Temp 96.5 °F (35.8 °C) (Temporal)   Ht 152.4 cm (60\")   Wt 63 kg (139 lb)   SpO2 94%   BMI 27.15 kg/m²       Physical Exam  Vitals reviewed.   Constitutional:       General: She is awake. She is not in acute distress.     Appearance: Normal appearance. She is well-developed and well-groomed.   HENT:      Head: Normocephalic.   Pulmonary:      Effort: Pulmonary effort is normal. No respiratory distress.   Abdominal:      General: Abdomen is flat. Bowel sounds are normal. There is no distension.      Palpations: Abdomen is soft. There is no hepatomegaly or mass.   Skin:     Coloration: Skin is not " pale.   Neurological:      Mental Status: She is alert and oriented to person, place, and time.      Gait: Gait is intact.   Psychiatric:         Mood and Affect: Mood and affect normal.         Speech: Speech normal.         Behavior: Behavior is cooperative.         Judgment: Judgment normal.          Result Review :             Assessment and Plan    Diagnoses and all orders for this visit:    1. Celiac disease (Primary)  -     Celiac Comprehensive Panel; Future    2. Elevated transaminase level  -     US Liver    3. Fatty liver  -     US Liver    Continue gluten-free diet which is working well for her.  We will have celiac labs checked with her next blood work which is scheduled soon with oncology.    She does have a history of fatty liver and her recent liver functions were more elevated than previous.  Recommend updated liver ultrasound.  Oncology is rechecking her LFTs soon.  Will follow these.      Follow Up   Return in about 1 year (around 11/30/2024).    Dragon dictation used throughout this note.     April Faustin PA-C

## 2023-12-11 ENCOUNTER — OFFICE VISIT (OUTPATIENT)
Dept: PLASTIC SURGERY | Facility: CLINIC | Age: 43
End: 2023-12-11
Payer: COMMERCIAL

## 2023-12-11 VITALS
HEIGHT: 60 IN | DIASTOLIC BLOOD PRESSURE: 80 MMHG | HEART RATE: 82 BPM | BODY MASS INDEX: 27.48 KG/M2 | TEMPERATURE: 97.3 F | SYSTOLIC BLOOD PRESSURE: 118 MMHG | OXYGEN SATURATION: 98 % | WEIGHT: 140 LBS

## 2023-12-11 DIAGNOSIS — N65.0 DEFORMITY AND DISPROPORTION OF RECONSTRUCTED BREAST: Primary | ICD-10-CM

## 2023-12-11 DIAGNOSIS — N65.1 DEFORMITY AND DISPROPORTION OF RECONSTRUCTED BREAST: Primary | ICD-10-CM

## 2023-12-11 PROCEDURE — 99024 POSTOP FOLLOW-UP VISIT: CPT | Performed by: SURGERY

## 2023-12-12 ENCOUNTER — PREP FOR SURGERY (OUTPATIENT)
Dept: OTHER | Facility: HOSPITAL | Age: 43
End: 2023-12-12
Payer: COMMERCIAL

## 2023-12-12 DIAGNOSIS — N65.1 DEFORMITY AND DISPROPORTION OF RECONSTRUCTED BREAST: Primary | ICD-10-CM

## 2023-12-12 DIAGNOSIS — N65.0 DEFORMITY AND DISPROPORTION OF RECONSTRUCTED BREAST: Primary | ICD-10-CM

## 2023-12-12 RX ORDER — SCOLOPAMINE TRANSDERMAL SYSTEM 1 MG/1
1 PATCH, EXTENDED RELEASE TRANSDERMAL CONTINUOUS
OUTPATIENT
Start: 2023-12-12 | End: 2023-12-15

## 2023-12-12 RX ORDER — CEFAZOLIN SODIUM 2 G/100ML
2000 INJECTION, SOLUTION INTRAVENOUS ONCE
OUTPATIENT
Start: 2023-12-12 | End: 2023-12-12

## 2023-12-12 RX ORDER — ACETAMINOPHEN 500 MG
1000 TABLET ORAL ONCE
OUTPATIENT
Start: 2023-12-12 | End: 2023-12-12

## 2023-12-15 ENCOUNTER — TELEPHONE (OUTPATIENT)
Dept: PLASTIC SURGERY | Facility: CLINIC | Age: 43
End: 2023-12-15

## 2023-12-17 DIAGNOSIS — N65.1 DEFORMITY AND DISPROPORTION OF RECONSTRUCTED BREAST: Primary | ICD-10-CM

## 2023-12-17 DIAGNOSIS — N65.0 DEFORMITY AND DISPROPORTION OF RECONSTRUCTED BREAST: Primary | ICD-10-CM

## 2023-12-21 ENCOUNTER — TREATMENT (OUTPATIENT)
Dept: PHYSICAL THERAPY | Facility: CLINIC | Age: 43
End: 2023-12-21
Payer: COMMERCIAL

## 2023-12-21 DIAGNOSIS — Z78.9 IMPAIRED INSTRUMENTAL ACTIVITIES OF DAILY LIVING: ICD-10-CM

## 2023-12-21 DIAGNOSIS — N65.0 DEFORMITY AND DISPROPORTION OF RECONSTRUCTED BREAST: Primary | ICD-10-CM

## 2023-12-21 DIAGNOSIS — N65.1 DEFORMITY AND DISPROPORTION OF RECONSTRUCTED BREAST: Primary | ICD-10-CM

## 2023-12-21 DIAGNOSIS — Z74.09 LIMITED MOBILITY: ICD-10-CM

## 2023-12-21 PROCEDURE — 97110 THERAPEUTIC EXERCISES: CPT | Performed by: PHYSICAL THERAPIST

## 2023-12-21 PROCEDURE — 97161 PT EVAL LOW COMPLEX 20 MIN: CPT | Performed by: PHYSICAL THERAPIST

## 2023-12-21 PROCEDURE — 97530 THERAPEUTIC ACTIVITIES: CPT | Performed by: PHYSICAL THERAPIST

## 2023-12-21 NOTE — PROGRESS NOTES
Physical Therapy Initial Evaluation and Plan of Care  Clinic Location [address] [facility] [address], [place] [id number]    Patient: Sravani Sky   : 1980  Diagnosis/ICD-10 Code:  Deformity and disproportion of reconstructed breast [N65.0, N65.1]  Referring practitioner: German Garduno MD  Date of Initial Visit: 2023  Today's Date: 2023  Patient seen for 1 session         Visit Diagnoses:    ICD-10-CM ICD-9-CM   1. Deformity and disproportion of reconstructed breast  N65.0 612.0    N65.1 612.1   2. Limited mobility  Z74.09 V49.89   3. Impaired instrumental activities of daily living  Z78.9 V49.89         Subjective Questionnaire: QuickDASH: 22.73      Subjective Evaluation    History of Present Illness  Mechanism of injury: Dx breast cancer, bilateral mastectomy 2014, 2nd Sx and subsequent radiation on R side, participated in PT for R UE lymphedema. Persistent R sided issues. Original implants beneath chest musculature. B implant reconstruction 7 weeks ago w/ fat grafting (flanks). Chest tightness relieved. R UE mobility is limited. Difficulty reaching behind to wipe, reaching 2nd shelf in cabinets, throwing w/ R arm.      Patient Occupation: School psychologist, computer work Pain  Current pain ratin  Quality: tight  Aggravating factors: overhead activity and outstretched reach  Progression: improved    Social Support  Lives with: spouse (13, 17 yo children)    Hand dominance: right    Treatments  Previous treatment: physical therapy  Current treatment: physical therapy  Patient Goals  Patient goals for therapy: decreased pain, increased motion, increased strength, independence with ADLs/IADLs and return to sport/leisure activities             Objective          Palpation     Right Tenderness of the latissimus, pectoralis major, pectoralis minor, serratus anterior and subscapularis.     Active Range of Motion   Left Shoulder   Flexion: 158 degrees   Abduction: 155  degrees   External rotation BTH: T2   Internal rotation BTB: T10     Right Shoulder   Flexion: 146 degrees   Abduction: 155 degrees   External rotation BTH: T2   Internal rotation BTB: T10     Passive Range of Motion   Left Shoulder   Flexion: 164 degrees with pain  Abduction: 140 degrees with pain  External rotation 90°: 90 degrees with pain    Right Shoulder   Flexion: 155 degrees with pain  Abduction: 150 degrees with pain  External rotation 90°: 90 degrees with pain    Strength/Myotome Testing     Left Shoulder     Planes of Motion   Flexion: 5   Abduction: 5   External rotation at 0°: 4+   Internal rotation at 0°: 4+     Isolated Muscles   Biceps: 5   Triceps: 5     Right Shoulder     Planes of Motion   Flexion: 4   Abduction: 5   External rotation at 0°: 4+   Internal rotation at 0°: 4+     Isolated Muscles   Biceps: 5   Triceps: 5     Tests     Additional Tests Details  All ULTT negative B          Assessment & Plan       Assessment  Impairments: abnormal muscle tone, abnormal or restricted ROM, activity intolerance, impaired physical strength, lacks appropriate home exercise program and pain with function   Functional limitations: reaching behind back and reaching overhead   Assessment details: Pt presents with R>L chest & shoulder pain post-breast reconstruction. S/s include painful and limited AROM, tenderness, and UE weakness. Will benefit from skilled PT services in order to address listed impairments and increase tolerance to normal daily activities including ADLs and recreational activities.    Prognosis: good    Goals  Plan Goals: Short Term Goals: 6 weeks. Patient will:  1. Be independent with initial HEP  2. Be instructed in posture and body mechanics.  3. Demonstrate full pain-free UE PROM to allow for progressing of therapy exercises.    Long Term Goals: 12 weeks. Pt will:  1. Exhibit (B) shoulder AROM to WFL to allow for reaching overhead and out (ABD) without pain limiting function.  2.  Demonstrate improved B UE MMT of >/= 4+/5 to allow for performance of ADL's/household management/recreational activities.  3. Pt able to reach overhead and lift 10# to allow for return to doing home/yard/recreational activities with min to no pain.  4. Report perceived disability </=10% based on QuickDASH    Plan  Therapy options: will be seen for skilled therapy services  Planned modality interventions: cryotherapy and thermotherapy (hydrocollator packs)  Planned therapy interventions: abdominal trunk stabilization, ADL retraining, balance/weight-bearing training, body mechanics training, flexibility, functional ROM exercises, home exercise program, joint mobilization, manual therapy, neuromuscular re-education, postural training, soft tissue mobilization, spinal/joint mobilization, strengthening, stretching and therapeutic activities  Frequency: 2x week  Duration in weeks: 12  Treatment plan discussed with: patient        History # of Personal Factors and/or Comorbidities: MODERATE (1-2)  Examination of Body System(s): # of elements: LOW (1-2)  Clinical Presentation: STABLE   Clinical Decision Making: LOW       Timed:         Manual Therapy:    0     mins  73284;     Therapeutic Exercise:    15     mins  35547;     Neuromuscular Liz:    0    mins  13048;    Therapeutic Activity:     8     mins  52666;     Gait Trainin     mins  87300;     Ultrasound:     0     mins  88156;    Ionto                               0    mins   36145  Self Care                       0     mins   09792  Canalith Repos    0     mins 94863      Un-Timed:  Electrical Stimulation:    0     mins  50586 ( );  Dry Needling     0     mins self-pay  Traction     0     mins 04342  Low Eval     25     Mins  91837  Mod Eval     0     Mins  76407  High Eval                       0     Mins  67206        Timed Treatment:   23   mins   Total Treatment:     48   mins          PT: Analisa Fernandes PT     License Number:  [id number]  Electronically signed by Analisa Fernandes, PT, 12/21/23, 9:09 AM EST    Certification Period: 12/21/2023 thru 3/19/2024  I certify that the therapy services are furnished while this patient is under my care.  The services outlined above are required by this patient, and will be reviewed every 90 days.         Physician Signature:__________________________________________________    PHYSICIAN: German Garduno MD  NPI: [id number]                                      DATE:      Please sign and return via fax to [email or web address] . Thank you, [facility].

## 2023-12-21 NOTE — PATIENT INSTRUCTIONS
Access Code: [email or web address]  URL: [email or web address]  Date: 12/21/2023  Prepared by: Analisa Fernandes    Exercises  - Supine Shoulder Flexion AAROM with Hands Clasped  - 1 x daily - 7 x weekly - 1 sets - 10 reps - 5s hold  - Supine Shoulder Flexion with Dowel  - 1 x daily - 7 x weekly - 1 sets - 10 reps - 5s hold  - Sidelying Thoracic Rotation with Open Book  - 1 x daily - 7 x weekly - 1 sets - 10 reps - 5s hold  - Doorway Pec Stretch at 90 Degrees Abduction  - 1 x daily - 7 x weekly - 1 sets - 3 reps - 20s hold

## 2023-12-29 ENCOUNTER — TREATMENT (OUTPATIENT)
Dept: PHYSICAL THERAPY | Facility: CLINIC | Age: 43
End: 2023-12-29
Payer: COMMERCIAL

## 2023-12-29 DIAGNOSIS — N65.1 DEFORMITY AND DISPROPORTION OF RECONSTRUCTED BREAST: Primary | ICD-10-CM

## 2023-12-29 DIAGNOSIS — Z78.9 IMPAIRED INSTRUMENTAL ACTIVITIES OF DAILY LIVING: ICD-10-CM

## 2023-12-29 DIAGNOSIS — Z74.09 LIMITED MOBILITY: ICD-10-CM

## 2023-12-29 DIAGNOSIS — N65.0 DEFORMITY AND DISPROPORTION OF RECONSTRUCTED BREAST: Primary | ICD-10-CM

## 2023-12-29 NOTE — PROGRESS NOTES
Physical Therapy Daily Treatment Note      Patient: Sravani Sky   : 1980  Referring practitioner: German Garduno MD  Date of Initial Visit: Type: THERAPY  Noted: 2023  Today's Date: 2023  Patient seen for 2 sessions       Visit Diagnoses:    ICD-10-CM ICD-9-CM   1. Deformity and disproportion of reconstructed breast  N65.0 612.0    N65.1 612.1   2. Limited mobility  Z74.09 V49.89   3. Impaired instrumental activities of daily living  Z78.9 V49.89       Subjective   Has not performed home exercises often due to holidays.    Objective   See Exercise, Manual, and Modality Logs for complete treatment.     Assessment/Plan  Pt prefers standing & seated exercises vs supine due to convenience. Updated HEP. Progress per POC.    Timed:         Manual Therapy:    10     mins  19978;     Therapeutic Exercise:    10     mins  09087;     Neuromuscular Liz:    0    mins  37728;    Therapeutic Activity:     10     mins  89986;     Gait Trainin     mins  29008;     Ultrasound:     0     mins  27330;    Ionto                               0    mins   00057        Timed Treatment:   30   mins   Total Treatment:     30   mins    Analisa Fernandes PT  [place] License: [id number]

## 2024-01-02 ENCOUNTER — TREATMENT (OUTPATIENT)
Dept: PHYSICAL THERAPY | Facility: CLINIC | Age: 44
End: 2024-01-02
Payer: COMMERCIAL

## 2024-01-02 DIAGNOSIS — Z74.09 LIMITED MOBILITY: ICD-10-CM

## 2024-01-02 DIAGNOSIS — Z78.9 IMPAIRED INSTRUMENTAL ACTIVITIES OF DAILY LIVING: ICD-10-CM

## 2024-01-02 DIAGNOSIS — N65.1 DEFORMITY AND DISPROPORTION OF RECONSTRUCTED BREAST: Primary | ICD-10-CM

## 2024-01-02 DIAGNOSIS — N65.0 DEFORMITY AND DISPROPORTION OF RECONSTRUCTED BREAST: Primary | ICD-10-CM

## 2024-01-02 PROCEDURE — 97110 THERAPEUTIC EXERCISES: CPT | Performed by: PHYSICAL THERAPIST

## 2024-01-02 PROCEDURE — 97140 MANUAL THERAPY 1/> REGIONS: CPT | Performed by: PHYSICAL THERAPIST

## 2024-01-02 NOTE — PROGRESS NOTES
Physical Therapy Daily Treatment Note      Patient: Sravani Sky   : 1980  Referring practitioner: German Garduno MD  Date of Initial Visit: Type: THERAPY  Noted: 2023  Today's Date: 2024  Patient seen for 3 sessions       Visit Diagnoses:    ICD-10-CM ICD-9-CM   1. Deformity and disproportion of reconstructed breast  N65.0 612.0    N65.1 612.1   2. Limited mobility  Z74.09 V49.89   3. Impaired instrumental activities of daily living  Z78.9 V49.89       Subjective   Relief with exercise. Still has difficulty reaching overhead.    Objective   See Exercise, Manual, and Modality Logs for complete treatment.     Assessment/Plan  Significant tone and tenderness of R pectorals, latissimus, and subscapularis. Tolerated soft tissue massage well despite tenderness. Utilized heat to offset post-massage tenderness. Progress per POC.    Timed:         Manual Therapy:    25     mins  16839;     Therapeutic Exercise:    8     mins  44764;     Neuromuscular Liz:    0    mins  49525;    Therapeutic Activity:     0     mins  75981;     Gait Trainin     mins  10789;     Ultrasound:     0     mins  96015;    Ionto                               0    mins   01258        Timed Treatment:   33   mins   Total Treatment:     33   mins    Analisa Fernandes, PT  [place] License: [id number]

## 2024-01-08 ENCOUNTER — TREATMENT (OUTPATIENT)
Dept: PHYSICAL THERAPY | Facility: CLINIC | Age: 44
End: 2024-01-08
Payer: COMMERCIAL

## 2024-01-08 DIAGNOSIS — Z74.09 LIMITED MOBILITY: ICD-10-CM

## 2024-01-08 DIAGNOSIS — N65.0 DEFORMITY AND DISPROPORTION OF RECONSTRUCTED BREAST: Primary | ICD-10-CM

## 2024-01-08 DIAGNOSIS — Z78.9 IMPAIRED INSTRUMENTAL ACTIVITIES OF DAILY LIVING: ICD-10-CM

## 2024-01-08 DIAGNOSIS — N65.1 DEFORMITY AND DISPROPORTION OF RECONSTRUCTED BREAST: Primary | ICD-10-CM

## 2024-01-08 PROCEDURE — 97110 THERAPEUTIC EXERCISES: CPT | Performed by: PHYSICAL THERAPIST

## 2024-01-08 PROCEDURE — 97140 MANUAL THERAPY 1/> REGIONS: CPT | Performed by: PHYSICAL THERAPIST

## 2024-01-08 NOTE — PROGRESS NOTES
Physical Therapy Daily Treatment Note      Patient: Sravani Sky   : 1980  Referring practitioner: German Garduno MD  Date of Initial Visit: Type: THERAPY  Noted: 2023  Today's Date: 2024  Patient seen for 4 sessions       Visit Diagnoses:    ICD-10-CM ICD-9-CM   1. Deformity and disproportion of reconstructed breast  N65.0 612.0    N65.1 612.1   2. Limited mobility  Z74.09 V49.89   3. Impaired instrumental activities of daily living  Z78.9 V49.89       Subjective   Felt better, less tight after massage.    Objective   See Exercise, Manual, and Modality Logs for complete treatment.       Assessment/Plan  Responding well to massage, heat, and exercise reporting less pain. Progress per POC.    Timed:         Manual Therapy:    25     mins  74515;     Therapeutic Exercise:    15     mins  70372;     Neuromuscular Liz:    0    mins  47796;    Therapeutic Activity:     0     mins  47646;     Gait Trainin     mins  53525;     Ultrasound:     0     mins  57247;    Ionto                               0    mins   16975        Timed Treatment:   40   mins   Total Treatment:     40   mins    Analisa Fernandes, PT  [place] License: [id number]

## 2024-01-15 ENCOUNTER — TREATMENT (OUTPATIENT)
Dept: PHYSICAL THERAPY | Facility: CLINIC | Age: 44
End: 2024-01-15
Payer: COMMERCIAL

## 2024-01-15 DIAGNOSIS — N65.0 DEFORMITY AND DISPROPORTION OF RECONSTRUCTED BREAST: Primary | ICD-10-CM

## 2024-01-15 DIAGNOSIS — Z74.09 LIMITED MOBILITY: ICD-10-CM

## 2024-01-15 DIAGNOSIS — N65.1 DEFORMITY AND DISPROPORTION OF RECONSTRUCTED BREAST: Primary | ICD-10-CM

## 2024-01-15 DIAGNOSIS — Z78.9 IMPAIRED INSTRUMENTAL ACTIVITIES OF DAILY LIVING: ICD-10-CM

## 2024-01-15 PROCEDURE — 97110 THERAPEUTIC EXERCISES: CPT | Performed by: PHYSICAL THERAPIST

## 2024-01-15 PROCEDURE — 97140 MANUAL THERAPY 1/> REGIONS: CPT | Performed by: PHYSICAL THERAPIST

## 2024-01-15 NOTE — PROGRESS NOTES
Physical Therapy Daily Treatment Note      Patient: Sravani Sky   : 1980  Referring practitioner: German Garduno MD  Date of Initial Visit: Type: THERAPY  Noted: 2023  Today's Date: 1/15/2024  Patient seen for 5 sessions       Visit Diagnoses:    ICD-10-CM ICD-9-CM   1. Deformity and disproportion of reconstructed breast  N65.0 612.0    N65.1 612.1   2. Limited mobility  Z74.09 V49.89   3. Impaired instrumental activities of daily living  Z78.9 V49.89       Subjective   Some relief with massage and exercise. R UE still feels tight.    Objective   See Exercise, Manual, and Modality Logs for complete treatment.     Assessment/Plan  Continues to have soft tissue restrictions in R chest and axillary region. Tolerates massage and exercise well reporting some improvement. Discussed potential benefit of massage therapy in conjunction with PT. Progress per POC.    Timed:         Manual Therapy:    25     mins  54482;     Therapeutic Exercise:    10     mins  76549;     Neuromuscular Liz:    0    mins  24929;    Therapeutic Activity:     0     mins  82240;     Gait Trainin     mins  84088;     Ultrasound:     0     mins  45161;    Ionto                               0    mins   56532        Timed Treatment:   35   mins   Total Treatment:     35   mins    Analisa Fernandes PT  [place] License: [id number]

## 2024-01-18 ENCOUNTER — TELEPHONE (OUTPATIENT)
Dept: GASTROENTEROLOGY | Facility: CLINIC | Age: 44
End: 2024-01-18
Payer: COMMERCIAL

## 2024-01-23 ENCOUNTER — TREATMENT (OUTPATIENT)
Dept: PHYSICAL THERAPY | Facility: CLINIC | Age: 44
End: 2024-01-23
Payer: COMMERCIAL

## 2024-01-23 DIAGNOSIS — Z74.09 LIMITED MOBILITY: ICD-10-CM

## 2024-01-23 DIAGNOSIS — Z78.9 IMPAIRED INSTRUMENTAL ACTIVITIES OF DAILY LIVING: ICD-10-CM

## 2024-01-23 DIAGNOSIS — N65.0 DEFORMITY AND DISPROPORTION OF RECONSTRUCTED BREAST: Primary | ICD-10-CM

## 2024-01-23 DIAGNOSIS — N65.1 DEFORMITY AND DISPROPORTION OF RECONSTRUCTED BREAST: Primary | ICD-10-CM

## 2024-01-23 PROCEDURE — 97140 MANUAL THERAPY 1/> REGIONS: CPT | Performed by: PHYSICAL THERAPIST

## 2024-01-23 NOTE — PROGRESS NOTES
Physical Therapy Daily Treatment Note      Patient: Sravani Sky   : 1980  Referring practitioner: German Garduno MD  Date of Initial Visit: Type: THERAPY  Noted: 2023  Today's Date: 2024  Patient seen for 6 sessions       Visit Diagnoses:    ICD-10-CM ICD-9-CM   1. Deformity and disproportion of reconstructed breast  N65.0 612.0    N65.1 612.1   2. Impaired instrumental activities of daily living  Z78.9 V49.89   3. Limited mobility  Z74.09 V49.89       Subjective   R UE feels about the same. Feels better x1 day post therapy. Some relief with exercises.     Objective   See Exercise, Manual, and Modality Logs for complete treatment.     Assessment/Plan  Tolerated more aggressive soft tissue techniques (cupping, skin rolling) well w/ min pain. Reported decreased tightness immediately post-treatment. Progress per POC.    Timed:         Manual Therapy:    30     mins  62614;     Therapeutic Exercise:    0     mins  55517;     Neuromuscular Liz:    0    mins  90224;    Therapeutic Activity:     0     mins  21167;     Gait Trainin     mins  47261;     Ultrasound:     0     mins  58221;    Ionto                               0    mins   73125        Timed Treatment:   30   mins   Total Treatment:     50   mins    Analisa Fernandes, PT  [place] License: [id number]

## 2024-01-29 ENCOUNTER — TREATMENT (OUTPATIENT)
Dept: PHYSICAL THERAPY | Facility: CLINIC | Age: 44
End: 2024-01-29
Payer: COMMERCIAL

## 2024-01-29 DIAGNOSIS — N65.1 DEFORMITY AND DISPROPORTION OF RECONSTRUCTED BREAST: Primary | ICD-10-CM

## 2024-01-29 DIAGNOSIS — N65.0 DEFORMITY AND DISPROPORTION OF RECONSTRUCTED BREAST: Primary | ICD-10-CM

## 2024-01-29 DIAGNOSIS — Z78.9 IMPAIRED INSTRUMENTAL ACTIVITIES OF DAILY LIVING: ICD-10-CM

## 2024-01-29 DIAGNOSIS — Z74.09 LIMITED MOBILITY: ICD-10-CM

## 2024-01-29 PROCEDURE — 97140 MANUAL THERAPY 1/> REGIONS: CPT | Performed by: PHYSICAL THERAPIST

## 2024-01-29 NOTE — PROGRESS NOTES
Physical Therapy Daily Treatment Note      Patient: Sravani Sky   : 1980  Referring practitioner: German Garduno MD  Date of Initial Visit: Type: THERAPY  Noted: 2023  Today's Date: 2024  Patient seen for 7 sessions       Visit Diagnoses:    ICD-10-CM ICD-9-CM   1. Deformity and disproportion of reconstructed breast  N65.0 612.0    N65.1 612.1   2. Impaired instrumental activities of daily living  Z78.9 V49.89   3. Limited mobility  Z74.09 V49.89       Subjective   R UE tightness decreased after cupping.    Objective   See Exercise, Manual, and Modality Logs for complete treatment.       Assessment/Plan  Responding well to skin rolling and cupping reporting decreased R UE tightness. Progress per POC.      Timed:         Manual Therapy:    30     mins  11270;     Therapeutic Exercise:    0     mins  21409;     Neuromuscular Liz:    0    mins  06554;    Therapeutic Activity:     0     mins  20568;     Gait Trainin     mins  33817;     Ultrasound:     0     mins  57165;    Ionto                               0    mins   56619        Timed Treatment:   30   mins   Total Treatment:     30   mins    Analisa Fernandes, PT  [place] License: [id number]

## 2024-02-12 ENCOUNTER — TREATMENT (OUTPATIENT)
Dept: PHYSICAL THERAPY | Facility: CLINIC | Age: 44
End: 2024-02-12
Payer: COMMERCIAL

## 2024-02-12 DIAGNOSIS — N65.0 DEFORMITY AND DISPROPORTION OF RECONSTRUCTED BREAST: Primary | ICD-10-CM

## 2024-02-12 DIAGNOSIS — Z78.9 IMPAIRED INSTRUMENTAL ACTIVITIES OF DAILY LIVING: ICD-10-CM

## 2024-02-12 DIAGNOSIS — Z74.09 LIMITED MOBILITY: ICD-10-CM

## 2024-02-12 DIAGNOSIS — N65.1 DEFORMITY AND DISPROPORTION OF RECONSTRUCTED BREAST: Primary | ICD-10-CM

## 2024-02-12 PROCEDURE — 97140 MANUAL THERAPY 1/> REGIONS: CPT | Performed by: PHYSICAL THERAPIST

## 2024-02-12 PROCEDURE — 97110 THERAPEUTIC EXERCISES: CPT | Performed by: PHYSICAL THERAPIST

## 2024-02-27 ENCOUNTER — TREATMENT (OUTPATIENT)
Dept: PHYSICAL THERAPY | Facility: CLINIC | Age: 44
End: 2024-02-27
Payer: COMMERCIAL

## 2024-02-27 DIAGNOSIS — Z74.09 LIMITED MOBILITY: ICD-10-CM

## 2024-02-27 DIAGNOSIS — Z78.9 IMPAIRED INSTRUMENTAL ACTIVITIES OF DAILY LIVING: ICD-10-CM

## 2024-02-27 DIAGNOSIS — N65.1 DEFORMITY AND DISPROPORTION OF RECONSTRUCTED BREAST: Primary | ICD-10-CM

## 2024-02-27 DIAGNOSIS — N65.0 DEFORMITY AND DISPROPORTION OF RECONSTRUCTED BREAST: Primary | ICD-10-CM

## 2024-02-27 PROCEDURE — 97530 THERAPEUTIC ACTIVITIES: CPT | Performed by: PHYSICAL THERAPIST

## 2024-02-27 PROCEDURE — 97110 THERAPEUTIC EXERCISES: CPT | Performed by: PHYSICAL THERAPIST

## 2024-02-27 NOTE — PROGRESS NOTES
Re-Assessment / Re-Certification        Patient: Sravani Sky   : 1980  Diagnosis/ICD-10 Code:  Deformity and disproportion of reconstructed breast [N65.0, N65.1]  Referring practitioner: German Garduno MD  Date of Initial Evaluation:  Type: THERAPY  Noted: 2023  Patient seen for 9 sessions      Subjective:   Sravani Sky reports: No drastic improvement overall. Maybe a little better. Still uncomfortable/limited with reaching. I am able to clasp bra but this is difficult. Has not tried throwing/bowling.    Subjective Questionnaire: QuickDASH: 11.36 (22.73 @ Eval)  Clinical Progress: improved  Home Program Compliance: Partial, a few exercises/day  Treatment has included: therapeutic exercise, manual therapy, moist heat, and cupping    Subjective   Objective   Palpation      Right Tenderness of the latissimus, pectoralis major, pectoralis minor, serratus anterior and subscapularis.      Active Range of Motion   Left Shoulder   Flexion: 160 degrees  Abduction: 165 degrees   External rotation BTH: T2   Internal rotation BTB: T4     Right Shoulder   Flexion: 149 degrees  Abduction: 160 degrees   External rotation BTH: T2   Internal rotation BTB: T6      Passive Range of Motion   Left Shoulder   Flexion: 164 degrees with pain  Abduction: 140 degrees with pain  External rotation 90°: 90 degrees with pain     Right Shoulder   Flexion: 175 degrees  Abduction: 175 degrees   External rotation 90°: 90 degrees     Strength/Myotome Testing      Left Shoulder      Planes of Motion   Flexion: 5   Abduction: 5   External rotation at 0°: 4+   Internal rotation at 0°: 4+      Isolated Muscles   Biceps: 5   Triceps: 5      Right Shoulder      Planes of Motion   Flexion: 4+   Abduction: 4+ (pain)   External rotation at 0°: 4+   Internal rotation at 0°: 4+      Isolated Muscles   Biceps: 5   Triceps: 5     Negative Florencia Quezada     Goals  Plan Goals: Short Term Goals: 6 weeks. Patient will:  1. Be independent  with initial HEP  2. Be instructed in posture and body mechanics.  3. Demonstrate full pain-free UE PROM to allow for progressing of therapy exercises.  ALL MET     Long Term Goals: 12 weeks. Pt will:  1. Exhibit (B) shoulder AROM to WFL to allow for reaching overhead and out (ABD) without pain limiting function. NOT MET  2. Demonstrate improved B UE MMT of >/= 4+/5 to allow for performance of ADL's/household management/recreational activities. MET  3. Pt able to reach overhead and lift 10# to allow for return to doing home/yard/recreational activities with min to no pain. NOT MET  4. Report perceived disability </=10% based on QuickDASH NOT MET    Assessment/Plan  Progress toward previous goals: Partially Met    PROM much improved. Continues to have pain w/ end range reaching and muscular tenderness. Updated HEP.       Recommendations: Continue as planned  Timeframe: 1 month  Prognosis to achieve goals: good    PT Signature: Analisa Fernandes, PT      Based upon review of the patient's progress and continued therapy plan, it is my medical opinion that Sravani Sky should continue physical therapy treatment at [facility]  [address] [place] [id number] [phone].    Signature: __________________________________  German Garduno MD    Manual Therapy:    0     mins  10388;  Therapeutic Exercise:    25     mins  91228;     Neuromuscular Liz:    0    mins  72037;    Therapeutic Activity:     8     mins  79826;     Gait Trainin     mins  28461;     Ultrasound:     0     mins  70667;    Work Hardening           0      mins 42439  Iontophoresis               0   mins 18594    Timed Treatment:   33   mins   Total Treatment:     33   mins

## 2024-04-02 ENCOUNTER — TELEPHONE (OUTPATIENT)
Dept: PLASTIC SURGERY | Facility: CLINIC | Age: 44
End: 2024-04-02
Payer: COMMERCIAL

## 2024-04-02 NOTE — TELEPHONE ENCOUNTER
Left voicemail, please return call due to changes for upcoming appointments and surgery with Dr. Garduno.

## 2024-04-04 NOTE — TELEPHONE ENCOUNTER
Left voicemail, informed of [place] clinic closure/surgery. Advised we can move appointments to the [place] office and surgery to [facility] or Dr. Garduno can enter a referral to another plastic surgeon located in [place]. Asked patient to contact the office via [employer] or phone by end of day 4/05/24. If we do not hear from patient by 04/05/24 appointments and surgery will be cancelled. We apologize for any inconvenience this may cause.

## 2024-04-08 ENCOUNTER — INFUSION (OUTPATIENT)
Dept: ONCOLOGY | Facility: HOSPITAL | Age: 44
End: 2024-04-08
Payer: COMMERCIAL

## 2024-04-08 ENCOUNTER — LAB (OUTPATIENT)
Dept: OTHER | Facility: HOSPITAL | Age: 44
End: 2024-04-08
Payer: COMMERCIAL

## 2024-04-08 ENCOUNTER — OFFICE VISIT (OUTPATIENT)
Dept: ONCOLOGY | Facility: CLINIC | Age: 44
End: 2024-04-08
Payer: COMMERCIAL

## 2024-04-08 VITALS
WEIGHT: 141.8 LBS | TEMPERATURE: 97.8 F | BODY MASS INDEX: 27.84 KG/M2 | RESPIRATION RATE: 16 BRPM | OXYGEN SATURATION: 97 % | HEIGHT: 60 IN | HEART RATE: 81 BPM | DIASTOLIC BLOOD PRESSURE: 79 MMHG | SYSTOLIC BLOOD PRESSURE: 118 MMHG

## 2024-04-08 DIAGNOSIS — M81.8 OTHER OSTEOPOROSIS WITHOUT CURRENT PATHOLOGICAL FRACTURE: Primary | ICD-10-CM

## 2024-04-08 DIAGNOSIS — M81.8 OTHER OSTEOPOROSIS WITHOUT CURRENT PATHOLOGICAL FRACTURE: ICD-10-CM

## 2024-04-08 DIAGNOSIS — Z15.01 BRCA1 GENETIC CARRIER: Primary | ICD-10-CM

## 2024-04-08 DIAGNOSIS — Z15.09 BRCA1 GENETIC CARRIER: Primary | ICD-10-CM

## 2024-04-08 LAB
ALBUMIN SERPL-MCNC: 4.8 G/DL (ref 3.5–5.2)
ALBUMIN/GLOB SERPL: 1.6 G/DL
ALP SERPL-CCNC: 88 U/L (ref 39–117)
ALT SERPL W P-5'-P-CCNC: 59 U/L (ref 1–33)
ANION GAP SERPL CALCULATED.3IONS-SCNC: 11.5 MMOL/L (ref 5–15)
AST SERPL-CCNC: 52 U/L (ref 1–32)
BASOPHILS # BLD AUTO: 0.03 10*3/MM3 (ref 0–0.2)
BASOPHILS NFR BLD AUTO: 0.5 % (ref 0–1.5)
BILIRUB SERPL-MCNC: 0.4 MG/DL (ref 0–1.2)
BUN SERPL-MCNC: 12 MG/DL (ref 6–20)
BUN/CREAT SERPL: 21.1 (ref 7–25)
CALCIUM SPEC-SCNC: 10.1 MG/DL (ref 8.6–10.5)
CHLORIDE SERPL-SCNC: 102 MMOL/L (ref 98–107)
CO2 SERPL-SCNC: 26.5 MMOL/L (ref 22–29)
CREAT SERPL-MCNC: 0.57 MG/DL (ref 0.57–1)
DEPRECATED RDW RBC AUTO: 42.9 FL (ref 37–54)
EGFRCR SERPLBLD CKD-EPI 2021: 115.1 ML/MIN/1.73
EOSINOPHIL # BLD AUTO: 0.12 10*3/MM3 (ref 0–0.4)
EOSINOPHIL NFR BLD AUTO: 1.9 % (ref 0.3–6.2)
ERYTHROCYTE [DISTWIDTH] IN BLOOD BY AUTOMATED COUNT: 12.6 % (ref 12.3–15.4)
GLOBULIN UR ELPH-MCNC: 3 GM/DL
GLUCOSE SERPL-MCNC: 102 MG/DL (ref 65–99)
HCT VFR BLD AUTO: 40 % (ref 34–46.6)
HGB BLD-MCNC: 13.4 G/DL (ref 12–15.9)
IMM GRANULOCYTES # BLD AUTO: 0.02 10*3/MM3 (ref 0–0.05)
IMM GRANULOCYTES NFR BLD AUTO: 0.3 % (ref 0–0.5)
LYMPHOCYTES # BLD AUTO: 1.09 10*3/MM3 (ref 0.7–3.1)
LYMPHOCYTES NFR BLD AUTO: 17.3 % (ref 19.6–45.3)
MAGNESIUM SERPL-MCNC: 2 MG/DL (ref 1.6–2.6)
MCH RBC QN AUTO: 31.5 PG (ref 26.6–33)
MCHC RBC AUTO-ENTMCNC: 33.5 G/DL (ref 31.5–35.7)
MCV RBC AUTO: 93.9 FL (ref 79–97)
MONOCYTES # BLD AUTO: 0.37 10*3/MM3 (ref 0.1–0.9)
MONOCYTES NFR BLD AUTO: 5.9 % (ref 5–12)
NEUTROPHILS NFR BLD AUTO: 4.67 10*3/MM3 (ref 1.7–7)
NEUTROPHILS NFR BLD AUTO: 74.1 % (ref 42.7–76)
NRBC BLD AUTO-RTO: 0 /100 WBC (ref 0–0.2)
PHOSPHATE SERPL-MCNC: 4.1 MG/DL (ref 2.5–4.5)
PLATELET # BLD AUTO: 245 10*3/MM3 (ref 140–450)
PMV BLD AUTO: 9.4 FL (ref 6–12)
POTASSIUM SERPL-SCNC: 4.5 MMOL/L (ref 3.5–5.2)
PROT SERPL-MCNC: 7.8 G/DL (ref 6–8.5)
RBC # BLD AUTO: 4.26 10*6/MM3 (ref 3.77–5.28)
SODIUM SERPL-SCNC: 140 MMOL/L (ref 136–145)
WBC NRBC COR # BLD AUTO: 6.3 10*3/MM3 (ref 3.4–10.8)

## 2024-04-08 PROCEDURE — 25010000002 DENOSUMAB 60 MG/ML SOLUTION PREFILLED SYRINGE: Performed by: INTERNAL MEDICINE

## 2024-04-08 PROCEDURE — 99214 OFFICE O/P EST MOD 30 MIN: CPT | Performed by: INTERNAL MEDICINE

## 2024-04-08 PROCEDURE — 83735 ASSAY OF MAGNESIUM: CPT | Performed by: INTERNAL MEDICINE

## 2024-04-08 PROCEDURE — 85025 COMPLETE CBC W/AUTO DIFF WBC: CPT | Performed by: INTERNAL MEDICINE

## 2024-04-08 PROCEDURE — 96372 THER/PROPH/DIAG INJ SC/IM: CPT

## 2024-04-08 PROCEDURE — 36415 COLL VENOUS BLD VENIPUNCTURE: CPT

## 2024-04-08 PROCEDURE — 84100 ASSAY OF PHOSPHORUS: CPT | Performed by: INTERNAL MEDICINE

## 2024-04-08 PROCEDURE — 80053 COMPREHEN METABOLIC PANEL: CPT | Performed by: INTERNAL MEDICINE

## 2024-04-08 RX ADMIN — DENOSUMAB 60 MG: 60 INJECTION SUBCUTANEOUS at 09:52

## 2024-04-08 NOTE — PROGRESS NOTES
Subjective      REASONS FOR FOLLOWUP:   1.  Bilateral breast cancer ER/IN positive left and triple negative on the right  2/BRCA1 positive          History of Present Illness  patient is a 42-year-old white female with BRCA1 positivity and bilateral breast cancers currently off antihormonal therapy after 7 years of treatment (on Arimidex for the last 5 years and  2year of tamoxifen ) 10years from diagnosis      She has been found to have celiac disease which explains all her GI symptoms and since going on a gluten-free diet GI symptoms are significantly improved-      She is off her Effexor and feels better overall     She had her implants fixed and is much better off now because the tightness and discomfort in her right chest wall has resolved with the new implants    Her bone density in 2022 shows mild improvement in the lumbar bone density and stable osteoporosis in the hips and we will continue Prolia today and see her back in 6 months with another bone density and decide whether to switch to Forteo      Pancreatic screening will start at age 45-annual Ca1 25's are being followed    Active Ambulatory Problems     Diagnosis Date Noted    Malignant neoplasm of female breast 04/15/2016    BRCA1 genetic carrier 04/15/2016    Anemia 08/04/2016    Long term current use of aromatase inhibitor 08/04/2016    Osteopenia 08/04/2016    Diarrhea of presumed infectious origin 08/06/2019    Elevated transaminase level 08/07/2019    Fatty liver 08/19/2019    Diarrhea due to malabsorption 09/12/2019    Lymphocytic colitis 09/12/2019    Iron adverse reaction 05/10/2021    Diarrhea 06/23/2021    Low ferritin level 06/23/2021    Celiac disease 11/10/2021    Other osteoporosis without current pathological fracture 12/06/2021    Deformity and disproportion of reconstructed breast 10/09/2023     Resolved Ambulatory Problems     Diagnosis Date Noted    No Resolved Ambulatory Problems     Past Medical History:   Diagnosis Date     Cancer     Heart burn     History of radiation therapy     Lymphedema      Past Surgical History:   Procedure Laterality Date    BREAST AUGMENTATION Right 2019    Procedure: RIGHT CAPSULOTOMY;  Surgeon: MADELYN Arreola MD;  Location: [facility];  Service: Plastics    BREAST IMPLANT SURGERY Bilateral     BREAST RECONSTRUCTION Bilateral 10/27/2023    Procedure: BREAST RECONSTRUCTION REVISION,  IMPLANT REPLACEMENT AND REPOSITIONING, MESH PLACEMENT, FAT GRAFTING,  (I will use revolve, skinny);  Surgeon: German Garduno MD;  Location: [facility];  Service: Plastics;  Laterality: Bilateral;    BREAST SURGERY Bilateral 2014    Mastectomy    BREAST TISSUE EXPANDER REMOVAL INSERTION OF IMPLANT       SECTION  , 2010    X2    COLONOSCOPY N/A 2019    Hypertrophied anal papilla, NBIH    COLONOSCOPY N/A 2021    Procedure: COLONOSCOPY TO CECUM/TI WITH POLYPECTOMY ( HOT SNARE) AND BIOPSY;SALINELIFT; RESOLUTION CLIP X2;  Surgeon: Sonja Sales MD;  Location: [facility] ENDOSCOPY;  Service: Gastroenterology;  Laterality: N/A;  microscopic colitis; diarrhea; iron def anemia  POST: COLON POLYP;  HEMORRHOIDS    ENDOSCOPY N/A 2021    Procedure: ESOPHAGOGASTRODUODENOSCOPY WITH BIOPSY;  Surgeon: Sonja Sales MD;  Location: [facility] ENDOSCOPY;  Service: Gastroenterology;  Laterality: N/A;  microscopic colitis; diarrhea; iron def anemia  POST:DUODENITIS  R/O CELIAC DISEASE; GASTRITIS    FAT GRAFTING Right 2019    Procedure: RIGHT FAT GRAFTING WITH REVOLVE;  Surgeon: MADELYN Arreola MD;  Location: [facility];  Service: Plastics    HYSTERECTOMY      Total         OB/GYN HISTORY: She is  2, para 2. Menarche at age 10. First childbirth wa s at age 26. She breast-fed her second child for 6 weeks. She was on birth control until her first childbirth and then used a Mirena IUD which she just had removed 3 months ago.     FAMILY HISTORY:  Parents are in their late 50s and are  healthy. She has no siblings. She has a paternal aunt with breast cancer in 40s, maternal grandmother with breast cancer at age 58, and both of the grandmother'  s sisters had breast cancer in their 50s. She has a maternal uncle with prostate cancer at age 54. There is no ovarian, uterine or pancreatic cancer in the family that she is aware of.      HEMATOLOGIC/ONCOLOGIC HISTORY:   The patient was seen on 08/05/2014, having had bilateral mastectomies. Right breast had a 5 mm residual invasive tumor, grade 3, with clear margins, two negative sentinel nodes (I cannot tell if the lymph node with the clip was remove d and we will have to assess this). Left breast shows a complete pathological response with two negative sentinel nodes. The decision was made to review her for radiation therapy because of enlarged right internal mammary node, pretreatment, and to star t tamoxifen. She is going to have her ovaries removed later in the year when she has expanders put in and we can switch to an AI at that point. I am looking into clinical trials for BCRA positive patients with residual disease and for triple negative canc ers with residual disease but the 5 mm tumor may disqualify her.        The patient was seen on 09/02/2014 with additional surgery to remove the lymph node in her axilla with the clip in it which was thankfully benign in addition to 3 other nodes which were nega tive. Radiation to the internal mammary chain planned plus tamoxifen for 5 years. The NSABP B55 study is expected to have an amendment including hormone positive patients and we will revisit this issue after her radiation. Her port has been removed.        NSA BP amendment has not yet gone through to include patients on tamoxifen, therefore, she is not eligible for NSABP B-55 as of 10/21/2014. Tamoxifen held for 1 month because of some dizzy spells and hysterectomy and oophorectomy planned for early 2015, at wh ich point we will switch to Arimidex.         The patient was seen on 02/25/2014 having CT scans because a preop chest x-ray before hysterectomy showed a shadow in the right apex. CT scans showed what seemed to be radiation change in the apex of the right lung. No abnormality in the neck or abdomen and a residual small seroma in the right axilla. Path reports on her hysterectomy and BSO were benign and because of her intolerance of tamoxifen, we started her on Effexor 12.5 b.i.d. to be increased as tolerated and t hen to resume tamoxifen every other day for a month and then go to daily if possible. Once she is stable at this, we will try and switch over to an AI if she can tolerate this.        The patient was seen on 04/21/2015 with a CT scan done to followup on changes noted on a previous CT scans, which were felt to be radiation-related. Thankfully, the scan shows diminished consolidation of the pleural surface and a small subpleural air space disease, stable at 8 x 4 mm and images through the upper abdomen and the re s t of the lung were negative. She is up to 37.5 of Effexor with good control of her hot flashes and taking her tamoxifen every other day and we have asked her to increase her tamoxifen to daily and we will see her back in 3 months and if she is tolerating this well, consider switching to an AI at that time.        Patient seen on 07/21/2015, tolerating tamoxifen well at full dose for the last 3 months since her hysterectomy. Plans to switch to Femara in 2-3 months. Nocturnal cough, which I suspect is allergic or reflux related. Consider a steroid inhaler.        7/17 Arimidex since July 2015.  Overall she is tolerating Arimidex well with very few hot flashes because of the Effexor but she is clearly not herself due to the postmenopausal state and has less energy low libido and I think some amount of depression which is not unusual in this situation.  Her mother had osteoporosis and her baseline bone density is osteopenia which is fairly  severe and I added Boniva monthly in addition to calcium and vitamin D    .Her neck pain is resolved she is sleeping okay appetite is fair and apart from fatigue which is related to her postmenopausal state she appears to be doing okay.  She is a little more irritable than usual but realizes this is part of the postmenopausal state  Complains of some irritable bowel type symptoms with constipation alternating with diarrhea and she is a little more anemic than usual and her ferritin was low and she was given oral iron with good improvement in her hemoglobin.  I'm reluctant to add another test with colonoscopy for at this point and we will wait and see how she does off iron and if her hemoglobin drops and her iron is low again the colonoscopy and EGD will be scheduled .  Her lymphedema is a little better this summer    She continues to have discomfort over the right chest wall where she received radiation and does not feel that the implant is his flexible and stiffer and because of the radiation fibrosis on CAT scan has been concerned about issues here.    Her liver function tests have been barely up on and off and therefore we will do another ultrasound to evaluate the liver and gallbladder.  2/19  she continues to have some tenderness in the rib cage below the right implant and is worried about this.  We did a bone scan in November which was benign but her bone density shows osteoporosis and she has been on Boniva more regularly now but I still want to repeat the bone density in November and add Prolia if it is not improved  8/19  CAT scan of the chest abdomen pelvis showed no evidence of recurrent cancer but a fatty liver was noted  I told her to follow-up with the gastroenterologist to ask how best to deal with her fatty liver and also how long to stay on the steroids as she has no follow-up appointment with them-lymphocytic colitis diagnosed and treated with steroids?  Related to Arimidex    10/19  Switch to  tamoxifen again because bone density shows osteoporosis in her spine and both hips patient declined Prolia will add Boniva    5/21  She is doing well overall but had a recurrence of her lymphocytic colitis and Initially we thought maybe Arimidex was causing this colitis so we switch her to tamoxifen she still had a recurrence therefore I do not think this is drug related-she has not been taking the Boniva so this is not the cause but she is on Effexor and apparently this may cause this so I think it is time to get her off the Effexor which she can only do if she comes off the tamoxifen.  She does complain of some left lower quadrant discomfort on and off and I think she needs repeat colonoscopy because her iron stores are low and she is iron deficient    At this point she has been on hormonal blockade for almost 7 years for small tumor which was completely gone at the time of surgery after neoadjuvant chemotherapy and I think this is adequate treatment although she is very scared to stop the tamoxifen      She has had worsening fatigue and I think this is due to the iron deficiency.   she always has a little discomfort below the right implant which is stable    She is very concerned because of significant weight gain of 20 pounds in the last year some of which is from prednisone and some of which is due to the coronavirus pandemic and lack of exercise and some because of her postmenopausal state we discussed strategies for diet and exercise but hopefully after she stops her tamoxifen weans off the Effexor and this will improve    Ca1 25 is negative -we did an ultrasound of the pancreas last year because insurance would not approve her MRI because of her age    We discussed that this is a good time point and she has had no recurrence of her triple negative breast cancer that had residual disease after neoadjuvant chemo at 5 years.  For left breast cancer with which is hormone positive she had a path CR but we have  "opted to continue hormonal blockade for total of 7 years  which would be up in September but because of her GI complaints and weight gain I think it is reasonable to stop      SOCIAL HISTORY: She is , lives with her . She is school psychologist. She does not smoke. She drinks very minimally. No trouble with drug addiction.    FAMILY HISTORY: Parents are in their late 50s and are healthy. She has no siblings. She has a paternal aunt with breast cancer in 40s, maternal grandmother with breast cancer at age 58,   and both of the grandmother' s sisters had breast cancer in their 50s. She has a maternal uncle with prostate cancer at age 54. There is no ovarian, uterine or pancreatic cancer in the family that she is aware of. BRCA 1 positive    Review of Systems   Constitutional:  Negative for fatigue (Worse) and unexpected weight change.   HENT: Negative.     Cardiovascular:  Positive for chest pain (Around the right implant).   Gastrointestinal:  Negative for abdominal pain and diarrhea (stable ).   Endocrine: Negative for heat intolerance (same).   Genitourinary: Negative.    Neurological: Negative.    Psychiatric/Behavioral:  Positive for decreased concentration (same ) and sleep disturbance (little better ). Negative for dysphoric mood. The patient is nervous/anxious (better ).    All other systems reviewed and are negative.     A comprehensive 14 point review of systems was performed and was negative except as mentioned.    Medications:  The current medication list was reviewed in the EMR    ALLERGIES:    Allergies   Allergen Reactions    Gluten Meal Nausea And Vomiting     CELIAC DISEASE       Objective      Vitals:    04/08/24 0855   BP: 118/79   Pulse: 81   Resp: 16   Temp: 97.8 °F (36.6 °C)   TempSrc: Temporal   SpO2: 97%   Weight: 64.3 kg (141 lb 12.8 oz)   Height: 152.4 cm (60\")   PainSc: 0-No pain       Video visit no vitals      4/8/2024     8:54 AM   Current Status   ECOG score 0 "       Physical Exam   Pulmonary/Chest:           GENERAL:  Well-developed, well-nourished in no acute distress.   SKIN:  Warm, dry without rashes, purpura or petechiae.  HEAD:  Normocephalic.  EYES:  Pupils equal, round and reactive to light.  EOMs intact.  Conjunctivae normal.  EARS:  Hearing intact.  NOSE:  Septum midline.  No excoriations or nasal discharge.  MOUTH:  Tongue is well-papillated; no stomatitis or ulcers.  Lips normal.  THROAT:  Oropharynx without lesions or exudates.  NECK:  Supple with good range of motion; no thyromegaly or masses, no JVD.  LYMPHATICS:  No cervical, supraclavicular, axillary or inguinal adenopathy.  CHEST:  Lungs clear to percussion and auscultation. Good airflow.  BREASTS: Bilateral implants benign there is no axillary adenopathy.  CARDIAC:  Regular rate and rhythm without murmurs, rubs or gallops. Normal S1,S2.  ABDOMEN:  Soft, nontender with no organomegaly or masses.  No mass in the left lower quadrant  EXTREMITIES:  No clubbing, cyanosis or edema.  NEUROLOGICAL:  Cranial Nerves II-XII grossly intact.  No focal neurological deficits.  PSYCHIATRIC:  Normal affect and mood.     I have reexamined the patient and the results are consistent with the previously documented exam. Shiv Moreno MD       RECENT LABS:   WBC   Date Value Ref Range Status   04/08/2024 6.30 3.40 - 10.80 10*3/mm3 Final   05/10/2022 6.6 3.4 - 10.8 x10E3/uL Final     RBC   Date Value Ref Range Status   04/08/2024 4.26 3.77 - 5.28 10*6/mm3 Final   05/10/2022 4.36 3.77 - 5.28 x10E6/uL Final     Hemoglobin   Date Value Ref Range Status   04/08/2024 13.4 12.0 - 15.9 g/dL Final     Hematocrit   Date Value Ref Range Status   04/08/2024 40.0 34.0 - 46.6 % Final     Platelets   Date Value Ref Range Status   04/08/2024 245 140 - 450 10*3/mm3 Final      FINDINGS: Sonographic evaluation of the liver and selective structures  of the right upper quadrant was performed. The right kidney has a normal  appearance. No  abnormality of the liver is appreciated. The liver  measures on the order of 17.5 cm in anterior to posterior dimension. The  gallbladder has a normal appearance. The common bile duct measures 0.4  cm in diameter. Per the sonographer, the patient was nontender in the  right upper quadrant during the examination. Visualized portion of the  pancreas appears normal.      IMPRESSION:  Negative liver sonogram.      This report was finalized on 4/14/2017       NUCLEAR MEDICINE WHOLE BODY BONE SCAN     IMPRESSION:  No scintigraphic evidence for bony metastatic disease.     This report was finalized on 11/17/2018     CT CHEST, ABDOMEN, AND PELVIS WITH IV CONTRAST  IMPRESSION:  1. There is no convincing evidence for metastatic disease.  2. Paucity of formed stool within the colon. There is no colonic  thickening to suggest colitis.  3. Hepatic steatosis.         Assessment & Plan    1.[id number] triple negative right breast cancer. [id number] dose dense Adriamycin Cytoxan and weekly carbotaxol-2014  Right internal mammary node positive on CT scan.   Neoadjuvant dose dense Adriamycin/Cytoxan, weekly Taxol started on 01/20/2014 with plan for axillary dissection on the right and sentinel node on left, bilateral mastectomies plus radiation to the right breast and internal mammary nodes.  Addition of carboplatin with weekly Taxol to start on 03/24/2014  The patient was seen on 08/05/2014, postoperatively; bilateral mastectomies and sentinel nodes with a 5 mm residua l grade 3 tumor in the right breast with two negative sentinel nodes (cannot tell if the node with a clip was removed). Complete response in the left breast with two negative sentinel nodes. The decision was made to consider radiation because of her pret reatment internal mammary node on the right and tamoxifen therapy for the hormone positive breast cancer on right. Clinical trials are being looked into.   Additional surgery to remove the lymph node with the clip in  the right axilla, 3 nodes removed and negative for metastatic disease.   Worsening plane around her right implant to be evaluated on 9/23     2.cT1Nx left breast cancer invasive ductal carcinoma, ER/NM positive, HER2 negative. [id number]   8/14 on tamoxifen-switch to Arimidex after oophorectomy in July 2015  5 years of Arimidex +2 years of tamoxifen given adjuvant-discontinued 2021    3. BRCA 1 positive. Post hysterectomy and oophorectomy  4.  Lymphedema right arm improved with a sleeve  5.  Mild depression declines any treatment for this now  6.  Mild anemia due to iron deficiency responding to oral iron  New diagnosis of celiac disease 2022 probable cause of iron deficiency  7.  Osteopenia moderately severe-declined Prolia -Boniva                     was started    Osteoporosis documented in 11/21 Prolia to start Boniva to stop                  8.  Mildly elevated LFTs with negative liver and pancreas ultrasound -                     CAT scan consistent with fatty liver  LFTs mildly elevated on 9/14/2020 3 repeat in 1 to 2 months  9.  Exacerbation of hemorrhoidal bleeding due to diarrhea          10.  Lymphocytic colitis on budesonide   11. Celiac disease diagnosed by biopsy and serology              Plan  1  Check Ca1 25 annually  2..  MRCP will be next year   3. Prolia for osteoporosis today dose #6  4. . return in 6 months for follow-up with a DEXA scan.                4/8/2024      CC:

## 2024-09-20 ENCOUNTER — TELEPHONE (OUTPATIENT)
Dept: PLASTIC SURGERY | Facility: CLINIC | Age: 44
End: 2024-09-20
Payer: COMMERCIAL

## 2024-09-28 NOTE — TELEPHONE ENCOUNTER
----- Message from [place] Rep sent at 10/26/2020 10:00 AM EDT -----  Regarding: flare up  Contact: [phone]  Pt is wanting to talk to nurse regarding her colitis flare up.    
Called pt and pt reports she feels like she is having a flare and would like to make appt.  Appt made for tomorrow 10/27 at 215p with Adrianna PEREZ.   
no

## 2024-10-15 NOTE — PROGRESS NOTES
"Chief Complaint  Post-op Follow-up (Post op 1yr)    Subjective          History of Present Illness  Sravani Sky is a 44 y.o. female who presents to [facility] for Postoperative Follow-Up of BREAST RECONSTRUCTION REVISION,  IMPLANT REPLACEMENT AND REPOSITIONING, MESH PLACEMENT, FAT GRAFTING and skinny 10/27/23.     Pt presents today for 1yr post op and lump/bump on right implant. Pt states a few months ago she noticed a lump/bump in middle of right implant. Pt would also like to discuss if any revisions needs to be done.      Allergies: Gluten meal  Allergies Reconciled.    Review of Systems   All review of system has been reviewed and it  is negative except the ones note above.     Objective     /85 (BP Location: Left arm, Patient Position: Sitting, Cuff Size: Adult)   Pulse 71   Ht 152 cm (59.84\")   Wt 64 kg (141 lb)   SpO2 98%   BMI 27.68 kg/m²     Body mass index is 27.68 kg/m².    Physical Exam  General Inspection: breasts are tightly and disproportional to her chest. There is still indentation at the axillary tail and a fatty bump next to the right breast incision that is consistent with fat graft.      Result Review :                Assessment and Plan      Diagnoses and all orders for this visit:    1. Deformity and disproportion of reconstructed breast (Primary)        Plan:     Assessment & Plan  1. Right breast pain.  A nodule was identified in her right breast, adjacent to the scar. It appears to be a small amount of fat, without any signs of necrosis. An ultrasound will be performed. The plan is to replace the current implant with one that offers more projection and to perform a fat graft to address the depressions in her breasts. The axilla will also be addressed. The scar will be released and a fat graft will be applied to improve the shape. She has the option of perform just a little surgery c under local anesthesia to remove the fat pad, " or a more comprehensive surgery  to address all issues simultaneously. She was advised to consider these options and provide her decision at a later date.       Photos obtained today    Surgery:  Bilateral breast reconstruction revision with fat graft, implant exchange and axillary scar release  19380-50 revision of reconstructed breast  20680-50 implant removal   19342 - insertion of breast implant on a separate day from mastectomy       Implants Sientra:  Sientra Smooth round gel implant  HP [id number], 330 HP x2                                                                19009- 395, 420 XP x2    Single use sizer [id number]    Revolve     Mesh:  No mesh   OR time: 3 hours     German Garduno MD, PhD  NPI: [id number]          Follow Up     No follow-ups on file.    Patient was given instructions and counseling regarding her condition. Please see specific information pulled into the AVS if appropriate.     German Garduno MD  10/24/2024      Patient or patient representative verbalized consent for the use of Ambient Listening during the visit with  German Garduno MD for chart documentation. 10/25/2024  07:27 EDT

## 2024-10-18 ENCOUNTER — HOSPITAL ENCOUNTER (OUTPATIENT)
Dept: BONE DENSITY | Facility: HOSPITAL | Age: 44
Discharge: HOME | End: 2024-10-18
Admitting: INTERNAL MEDICINE
Payer: COMMERCIAL

## 2024-10-18 DIAGNOSIS — M81.8 OTHER OSTEOPOROSIS WITHOUT CURRENT PATHOLOGICAL FRACTURE: ICD-10-CM

## 2024-10-18 PROCEDURE — 77080 DXA BONE DENSITY AXIAL: CPT

## 2024-10-21 ENCOUNTER — APPOINTMENT (OUTPATIENT)
Dept: ONCOLOGY | Facility: HOSPITAL | Age: 44
End: 2024-10-21
Payer: COMMERCIAL

## 2024-10-21 ENCOUNTER — OFFICE VISIT (OUTPATIENT)
Dept: ONCOLOGY | Facility: CLINIC | Age: 44
End: 2024-10-21
Payer: COMMERCIAL

## 2024-10-21 ENCOUNTER — LAB (OUTPATIENT)
Dept: OTHER | Facility: HOSPITAL | Age: 44
End: 2024-10-21
Payer: COMMERCIAL

## 2024-10-21 ENCOUNTER — INFUSION (OUTPATIENT)
Dept: ONCOLOGY | Facility: HOSPITAL | Age: 44
End: 2024-10-21
Payer: COMMERCIAL

## 2024-10-21 VITALS
RESPIRATION RATE: 16 BRPM | DIASTOLIC BLOOD PRESSURE: 85 MMHG | HEIGHT: 60 IN | BODY MASS INDEX: 27.72 KG/M2 | SYSTOLIC BLOOD PRESSURE: 125 MMHG | WEIGHT: 141.2 LBS | HEART RATE: 69 BPM | OXYGEN SATURATION: 98 % | TEMPERATURE: 97.3 F

## 2024-10-21 DIAGNOSIS — M81.8 OTHER OSTEOPOROSIS WITHOUT CURRENT PATHOLOGICAL FRACTURE: ICD-10-CM

## 2024-10-21 DIAGNOSIS — Z15.09 BRCA1 GENETIC CARRIER: ICD-10-CM

## 2024-10-21 DIAGNOSIS — M81.8 OTHER OSTEOPOROSIS WITHOUT CURRENT PATHOLOGICAL FRACTURE: Primary | ICD-10-CM

## 2024-10-21 DIAGNOSIS — C50.119 MALIGNANT NEOPLASM OF CENTRAL PORTION OF BREAST IN FEMALE, ESTROGEN RECEPTOR POSITIVE, UNSPECIFIED LATERALITY: ICD-10-CM

## 2024-10-21 DIAGNOSIS — Z15.01 BRCA1 GENETIC CARRIER: ICD-10-CM

## 2024-10-21 DIAGNOSIS — Z17.0 MALIGNANT NEOPLASM OF CENTRAL PORTION OF BREAST IN FEMALE, ESTROGEN RECEPTOR POSITIVE, UNSPECIFIED LATERALITY: ICD-10-CM

## 2024-10-21 LAB
ALBUMIN SERPL-MCNC: 4.6 G/DL (ref 3.5–5.2)
ALBUMIN/GLOB SERPL: 1.4 G/DL
ALP SERPL-CCNC: 85 U/L (ref 39–117)
ALT SERPL W P-5'-P-CCNC: 68 U/L (ref 1–33)
ANION GAP SERPL CALCULATED.3IONS-SCNC: 10.2 MMOL/L (ref 5–15)
AST SERPL-CCNC: 57 U/L (ref 1–32)
BASOPHILS # BLD AUTO: 0.04 10*3/MM3 (ref 0–0.2)
BASOPHILS NFR BLD AUTO: 0.6 % (ref 0–1.5)
BILIRUB SERPL-MCNC: 0.4 MG/DL (ref 0–1.2)
BUN SERPL-MCNC: 15 MG/DL (ref 6–20)
BUN/CREAT SERPL: 26.3 (ref 7–25)
CALCIUM SPEC-SCNC: 9.9 MG/DL (ref 8.6–10.5)
CANCER AG125 SERPL QL: 2.3 U/ML (ref 0–38.1)
CHLORIDE SERPL-SCNC: 103 MMOL/L (ref 98–107)
CO2 SERPL-SCNC: 28.8 MMOL/L (ref 22–29)
CREAT SERPL-MCNC: 0.57 MG/DL (ref 0.57–1)
DEPRECATED RDW RBC AUTO: 41.9 FL (ref 37–54)
EGFRCR SERPLBLD CKD-EPI 2021: 115.1 ML/MIN/1.73
EOSINOPHIL # BLD AUTO: 0.21 10*3/MM3 (ref 0–0.4)
EOSINOPHIL NFR BLD AUTO: 3.3 % (ref 0.3–6.2)
ERYTHROCYTE [DISTWIDTH] IN BLOOD BY AUTOMATED COUNT: 12.1 % (ref 12.3–15.4)
GLOBULIN UR ELPH-MCNC: 3.3 GM/DL
GLUCOSE SERPL-MCNC: 92 MG/DL (ref 65–99)
HCT VFR BLD AUTO: 41 % (ref 34–46.6)
HGB BLD-MCNC: 13.8 G/DL (ref 12–15.9)
IMM GRANULOCYTES # BLD AUTO: 0.03 10*3/MM3 (ref 0–0.05)
IMM GRANULOCYTES NFR BLD AUTO: 0.5 % (ref 0–0.5)
LYMPHOCYTES # BLD AUTO: 1.09 10*3/MM3 (ref 0.7–3.1)
LYMPHOCYTES NFR BLD AUTO: 17 % (ref 19.6–45.3)
MAGNESIUM SERPL-MCNC: 2.2 MG/DL (ref 1.6–2.6)
MCH RBC QN AUTO: 32 PG (ref 26.6–33)
MCHC RBC AUTO-ENTMCNC: 33.7 G/DL (ref 31.5–35.7)
MCV RBC AUTO: 95.1 FL (ref 79–97)
MONOCYTES # BLD AUTO: 0.36 10*3/MM3 (ref 0.1–0.9)
MONOCYTES NFR BLD AUTO: 5.6 % (ref 5–12)
NEUTROPHILS NFR BLD AUTO: 4.69 10*3/MM3 (ref 1.7–7)
NEUTROPHILS NFR BLD AUTO: 73 % (ref 42.7–76)
NRBC BLD AUTO-RTO: 0 /100 WBC (ref 0–0.2)
PHOSPHATE SERPL-MCNC: 4.3 MG/DL (ref 2.5–4.5)
PLATELET # BLD AUTO: 241 10*3/MM3 (ref 140–450)
PMV BLD AUTO: 9.5 FL (ref 6–12)
POTASSIUM SERPL-SCNC: 4.5 MMOL/L (ref 3.5–5.2)
PROT SERPL-MCNC: 7.9 G/DL (ref 6–8.5)
RBC # BLD AUTO: 4.31 10*6/MM3 (ref 3.77–5.28)
SODIUM SERPL-SCNC: 142 MMOL/L (ref 136–145)
WBC NRBC COR # BLD AUTO: 6.42 10*3/MM3 (ref 3.4–10.8)

## 2024-10-21 PROCEDURE — 36415 COLL VENOUS BLD VENIPUNCTURE: CPT

## 2024-10-21 PROCEDURE — 84100 ASSAY OF PHOSPHORUS: CPT | Performed by: INTERNAL MEDICINE

## 2024-10-21 PROCEDURE — G0463 HOSPITAL OUTPT CLINIC VISIT: HCPCS

## 2024-10-21 PROCEDURE — 86304 IMMUNOASSAY TUMOR CA 125: CPT | Performed by: INTERNAL MEDICINE

## 2024-10-21 PROCEDURE — 83735 ASSAY OF MAGNESIUM: CPT | Performed by: INTERNAL MEDICINE

## 2024-10-21 PROCEDURE — 85025 COMPLETE CBC W/AUTO DIFF WBC: CPT | Performed by: INTERNAL MEDICINE

## 2024-10-21 PROCEDURE — 80053 COMPREHEN METABOLIC PANEL: CPT | Performed by: INTERNAL MEDICINE

## 2024-10-21 NOTE — PROGRESS NOTES
Subjective      REASONS FOR FOLLOWUP:   1.  Bilateral breast cancer ER/VA positive left and triple negative on the right  2/BRCA1 positive          History of Present Illness  patient is a 42-year-old white female with BRCA1 positivity and bilateral breast cancers currently off antihormonal therapy after 7 years of treatment (on Arimidex for the last 5 years and  2year of tamoxifen ) 11years from diagnosis      She has been found to have celiac disease which explains all her GI symptoms and since going on a gluten-free diet GI symptoms are significantly improved-      She is off her Effexor and feels better overall     She had her implants fixed but discomfort in her right chest wall has not resolved with the new implants and she is scheduled to see the plastic surgeon again next week    Her bone density in 2022 shows mild improvement in the lumbar bone density and stable osteoporosis in the hips and we will continue Prolia today and see her back in 6 months with another bone density and decide whether to switch to Forteo-density definitely shows improvements in the spine and hips but at this point I think we will move to Reclast because of the rapid drop in bone density when stopping the Prolia      Pancreatic screening will start at age 45-annual Ca1 25's are being followed    Active Ambulatory Problems     Diagnosis Date Noted    Malignant neoplasm of female breast 04/15/2016    BRCA1 genetic carrier 04/15/2016    Anemia 08/04/2016    Long term current use of aromatase inhibitor 08/04/2016    Osteopenia 08/04/2016    Diarrhea of presumed infectious origin 08/06/2019    Elevated transaminase level 08/07/2019    Fatty liver 08/19/2019    Diarrhea due to malabsorption 09/12/2019    Lymphocytic colitis 09/12/2019    Iron adverse reaction 05/10/2021    Diarrhea 06/23/2021    Low ferritin level 06/23/2021    Celiac disease 11/10/2021    Other osteoporosis without current pathological fracture 12/06/2021    Deformity  and disproportion of reconstructed breast 10/09/2023     Resolved Ambulatory Problems     Diagnosis Date Noted    No Resolved Ambulatory Problems     Past Medical History:   Diagnosis Date    Cancer     Heart burn     History of radiation therapy     Lymphedema      Past Surgical History:   Procedure Laterality Date    BREAST AUGMENTATION Right 2019    Procedure: RIGHT CAPSULOTOMY;  Surgeon: MADELYN Arreola MD;  Location: [facility];  Service: Plastics    BREAST IMPLANT SURGERY Bilateral     BREAST RECONSTRUCTION Bilateral 10/27/2023    Procedure: BREAST RECONSTRUCTION REVISION,  IMPLANT REPLACEMENT AND REPOSITIONING, MESH PLACEMENT, FAT GRAFTING,  (I will use revolve, skinny);  Surgeon: German Garduno MD;  Location: [facility];  Service: Plastics;  Laterality: Bilateral;    BREAST SURGERY Bilateral 2014    Mastectomy    BREAST TISSUE EXPANDER REMOVAL INSERTION OF IMPLANT       SECTION  , 2010    X2    COLONOSCOPY N/A 2019    Hypertrophied anal papilla, NBIH    COLONOSCOPY N/A 2021    Procedure: COLONOSCOPY TO CECUM/TI WITH POLYPECTOMY ( HOT SNARE) AND BIOPSY;SALINELIFT; RESOLUTION CLIP X2;  Surgeon: Sonja Sales MD;  Location: [facility];  Service: Gastroenterology;  Laterality: N/A;  microscopic colitis; diarrhea; iron def anemia  POST: COLON POLYP;  HEMORRHOIDS    ENDOSCOPY N/A 2021    Procedure: ESOPHAGOGASTRODUODENOSCOPY WITH BIOPSY;  Surgeon: Sonja Sales MD;  Location: [facility];  Service: Gastroenterology;  Laterality: N/A;  microscopic colitis; diarrhea; iron def anemia  POST:DUODENITIS  R/O CELIAC DISEASE; GASTRITIS    FAT GRAFTING Right 2019    Procedure: RIGHT FAT GRAFTING WITH REVOLVE;  Surgeon: MADELYN Arreola MD;  Location: [facility];  Service: Plastics    HYSTERECTOMY      Total         OB/GYN HISTORY: She is  2, para 2. Menarche at age 10. First childbirth wa s at age 26. She breast-fed her second child for 6  weeks. She was on birth control until her first childbirth and then used a Mirena IUD which she just had removed 3 months ago.     FAMILY HISTORY:  Parents are in their late 50s and are healthy. She has no siblings. She has a paternal aunt with breast cancer in 40s, maternal grandmother with breast cancer at age 58, and both of the grandmother'  s sisters had breast cancer in their 50s. She has a maternal uncle with prostate cancer at age 54. There is no ovarian, uterine or pancreatic cancer in the family that she is aware of.      HEMATOLOGIC/ONCOLOGIC HISTORY:   The patient was seen on 08/05/2014, having had bilateral mastectomies. Right breast had a 5 mm residual invasive tumor, grade 3, with clear margins, two negative sentinel nodes (I cannot tell if the lymph node with the clip was remove d and we will have to assess this). Left breast shows a complete pathological response with two negative sentinel nodes. The decision was made to review her for radiation therapy because of enlarged right internal mammary node, pretreatment, and to star t tamoxifen. She is going to have her ovaries removed later in the year when she has expanders put in and we can switch to an AI at that point. I am looking into clinical trials for BCRA positive patients with residual disease and for triple negative canc ers with residual disease but the 5 mm tumor may disqualify her.        The patient was seen on 09/02/2014 with additional surgery to remove the lymph node in her axilla with the clip in it which was thankfully benign in addition to 3 other nodes which were nega tive. Radiation to the internal mammary chain planned plus tamoxifen for 5 years. The NSABP B55 study is expected to have an amendment including hormone positive patients and we will revisit this issue after her radiation. Her port has been removed.        NSA BP amendment has not yet gone through to include patients on tamoxifen, therefore, she is not eligible for  NSABP B-55 as of 10/21/2014. Tamoxifen held for 1 month because of some dizzy spells and hysterectomy and oophorectomy planned for early 2015, at wh ich point we will switch to Arimidex.        The patient was seen on 02/25/2014 having CT scans because a preop chest x-ray before hysterectomy showed a shadow in the right apex. CT scans showed what seemed to be radiation change in the apex of the right lung. No abnormality in the neck or abdomen and a residual small seroma in the right axilla. Path reports on her hysterectomy and BSO were benign and because of her intolerance of tamoxifen, we started her on Effexor 12.5 b.i.d. to be increased as tolerated and t hen to resume tamoxifen every other day for a month and then go to daily if possible. Once she is stable at this, we will try and switch over to an AI if she can tolerate this.        The patient was seen on 04/21/2015 with a CT scan done to followup on changes noted on a previous CT scans, which were felt to be radiation-related. Thankfully, the scan shows diminished consolidation of the pleural surface and a small subpleural air space disease, stable at 8 x 4 mm and images through the upper abdomen and the re s t of the lung were negative. She is up to 37.5 of Effexor with good control of her hot flashes and taking her tamoxifen every other day and we have asked her to increase her tamoxifen to daily and we will see her back in 3 months and if she is tolerating this well, consider switching to an AI at that time.        Patient seen on 07/21/2015, tolerating tamoxifen well at full dose for the last 3 months since her hysterectomy. Plans to switch to Femara in 2-3 months. Nocturnal cough, which I suspect is allergic or reflux related. Consider a steroid inhaler.        7/17 Arimidex since July 2015.  Overall she is tolerating Arimidex well with very few hot flashes because of the Effexor but she is clearly not herself due to the postmenopausal state and has  less energy low libido and I think some amount of depression which is not unusual in this situation.  Her mother had osteoporosis and her baseline bone density is osteopenia which is fairly severe and I added Boniva monthly in addition to calcium and vitamin D    .Her neck pain is resolved she is sleeping okay appetite is fair and apart from fatigue which is related to her postmenopausal state she appears to be doing okay.  She is a little more irritable than usual but realizes this is part of the postmenopausal state  Complains of some irritable bowel type symptoms with constipation alternating with diarrhea and she is a little more anemic than usual and her ferritin was low and she was given oral iron with good improvement in her hemoglobin.  I'm reluctant to add another test with colonoscopy for at this point and we will wait and see how she does off iron and if her hemoglobin drops and her iron is low again the colonoscopy and EGD will be scheduled .  Her lymphedema is a little better this summer    She continues to have discomfort over the right chest wall where she received radiation and does not feel that the implant is his flexible and stiffer and because of the radiation fibrosis on CAT scan has been concerned about issues here.    Her liver function tests have been barely up on and off and therefore we will do another ultrasound to evaluate the liver and gallbladder.  2/19  she continues to have some tenderness in the rib cage below the right implant and is worried about this.  We did a bone scan in November which was benign but her bone density shows osteoporosis and she has been on Boniva more regularly now but I still want to repeat the bone density in November and add Prolia if it is not improved  8/19  CAT scan of the chest abdomen pelvis showed no evidence of recurrent cancer but a fatty liver was noted  I told her to follow-up with the gastroenterologist to ask how best to deal with her fatty liver  and also how long to stay on the steroids as she has no follow-up appointment with them-lymphocytic colitis diagnosed and treated with steroids?  Related to Arimidex    10/19  Switch to tamoxifen again because bone density shows osteoporosis in her spine and both hips patient declined Prolia will add Boniva    5/21  She is doing well overall but had a recurrence of her lymphocytic colitis and Initially we thought maybe Arimidex was causing this colitis so we switch her to tamoxifen she still had a recurrence therefore I do not think this is drug related-she has not been taking the Boniva so this is not the cause but she is on Effexor and apparently this may cause this so I think it is time to get her off the Effexor which she can only do if she comes off the tamoxifen.  She does complain of some left lower quadrant discomfort on and off and I think she needs repeat colonoscopy because her iron stores are low and she is iron deficient    At this point she has been on hormonal blockade for almost 7 years for small tumor which was completely gone at the time of surgery after neoadjuvant chemotherapy and I think this is adequate treatment although she is very scared to stop the tamoxifen      She has had worsening fatigue and I think this is due to the iron deficiency.   she always has a little discomfort below the right implant which is stable    She is very concerned because of significant weight gain of 20 pounds in the last year some of which is from prednisone and some of which is due to the coronavirus pandemic and lack of exercise and some because of her postmenopausal state we discussed strategies for diet and exercise but hopefully after she stops her tamoxifen weans off the Effexor and this will improve    Ca1 25 is negative -we did an ultrasound of the pancreas last year because insurance would not approve her MRI because of her age    We discussed that this is a good time point and she has had no recurrence  of her triple negative breast cancer that had residual disease after neoadjuvant chemo at 5 years.  For left breast cancer with which is hormone positive she had a path CR but we have opted to continue hormonal blockade for total of 7 years  which would be up in September but because of her GI complaints and weight gain I think it is reasonable to stop      SOCIAL HISTORY: She is , lives with her . She is school psychologist. She does not smoke. She drinks very minimally. No trouble with drug addiction.    FAMILY HISTORY: Parents are in their late 50s and are healthy. She has no siblings. She has a paternal aunt with breast cancer in 40s, maternal grandmother with breast cancer at age 58,   and both of the grandmother' s sisters had breast cancer in their 50s. She has a maternal uncle with prostate cancer at age 54. There is no ovarian, uterine or pancreatic cancer in the family that she is aware of. BRCA 1 positive    Review of Systems   Constitutional:  Negative for fatigue (Worse) and unexpected weight change.   HENT: Negative.     Cardiovascular:  Positive for chest pain (Around the right implant).   Gastrointestinal:  Negative for abdominal pain and diarrhea (stable ).   Endocrine: Negative for heat intolerance (same).   Genitourinary: Negative.    Neurological: Negative.    Psychiatric/Behavioral:  Positive for decreased concentration (same ) and sleep disturbance (little better ). Negative for dysphoric mood. The patient is nervous/anxious (better ).    All other systems reviewed and are negative.     A comprehensive 14 point review of systems was performed and was negative except as mentioned.    Medications:  The current medication list was reviewed in the EMR    ALLERGIES:    Allergies   Allergen Reactions    Gluten Meal Nausea And Vomiting     CELIAC DISEASE       Objective      Vitals:    10/21/24 0917   BP: 125/85   Pulse: 69   Resp: 16   Temp: 97.3 °F (36.3 °C)   TempSrc: Oral   SpO2: 98%  "  Weight: 64 kg (141 lb 3.2 oz)   Height: 152 cm (59.84\")   PainSc: 0-No pain       Video visit no vitals      10/21/2024     9:17 AM   Current Status   ECOG score 0       Physical Exam   Pulmonary/Chest:           GENERAL:  Well-developed, well-nourished in no acute distress.   SKIN:  Warm, dry without rashes, purpura or petechiae.  HEAD:  Normocephalic.  EYES:  Pupils equal, round and reactive to light.  EOMs intact.  Conjunctivae normal.  EARS:  Hearing intact.  NOSE:  Septum midline.  No excoriations or nasal discharge.  MOUTH:  Tongue is well-papillated; no stomatitis or ulcers.  Lips normal.  THROAT:  Oropharynx without lesions or exudates.  NECK:  Supple with good range of motion; no thyromegaly or masses, no JVD.  LYMPHATICS:  No cervical, supraclavicular, axillary or inguinal adenopathy.  CHEST:  Lungs clear to percussion and auscultation. Good airflow.  BREASTS: Bilateral implants benign there is no axillary adenopathy.tender fat necrosis 12 oclock 9 oclock  CARDIAC:  Regular rate and rhythm without murmurs, rubs or gallops. Normal S1,S2.  ABDOMEN:  Soft, nontender with no organomegaly or masses.  No mass in the left lower quadrant  EXTREMITIES:  No clubbing, cyanosis or edema.  NEUROLOGICAL:  Cranial Nerves II-XII grossly intact.  No focal neurological deficits.  PSYCHIATRIC:  Normal affect and mood.     I have reexamined the patient and the results are consistent with the previously documented exam. Shiv Moreno MD       RECENT LABS:   WBC   Date Value Ref Range Status   10/21/2024 6.42 3.40 - 10.80 10*3/mm3 Final   05/10/2022 6.6 3.4 - 10.8 x10E3/uL Final     RBC   Date Value Ref Range Status   10/21/2024 4.31 3.77 - 5.28 10*6/mm3 Final   05/10/2022 4.36 3.77 - 5.28 x10E6/uL Final     Hemoglobin   Date Value Ref Range Status   10/21/2024 13.8 12.0 - 15.9 g/dL Final     Hematocrit   Date Value Ref Range Status   10/21/2024 41.0 34.0 - 46.6 % Final     Platelets   Date Value Ref Range Status "   10/21/2024 241 140 - 450 10*3/mm3 Final      FINDINGS: Sonographic evaluation of the liver and selective structures  of the right upper quadrant was performed. The right kidney has a normal  appearance. No abnormality of the liver is appreciated. The liver  measures on the order of 17.5 cm in anterior to posterior dimension. The  gallbladder has a normal appearance. The common bile duct measures 0.4  cm in diameter. Per the sonographer, the patient was nontender in the  right upper quadrant during the examination. Visualized portion of the  pancreas appears normal.      IMPRESSION:  Negative liver sonogram.      This report was finalized on 4/14/2017       NUCLEAR MEDICINE WHOLE BODY BONE SCAN     IMPRESSION:  No scintigraphic evidence for bony metastatic disease.     This report was finalized on 11/17/2018     CT CHEST, ABDOMEN, AND PELVIS WITH IV CONTRAST  IMPRESSION:  1. There is no convincing evidence for metastatic disease.  2. Paucity of formed stool within the colon. There is no colonic  thickening to suggest colitis.  3. Hepatic steatosis.         Assessment & Plan    1.[id number] triple negative right breast cancer. [id number] dose dense Adriamycin Cytoxan and weekly carbotaxol-2014  Right internal mammary node positive on CT scan.   Neoadjuvant dose dense Adriamycin/Cytoxan, weekly Taxol started on 01/20/2014 with plan for axillary dissection on the right and sentinel node on left, bilateral mastectomies plus radiation to the right breast and internal mammary nodes.  Addition of carboplatin with weekly Taxol to start on 03/24/2014  The patient was seen on 08/05/2014, postoperatively; bilateral mastectomies and sentinel nodes with a 5 mm residua l grade 3 tumor in the right breast with two negative sentinel nodes (cannot tell if the node with a clip was removed). Complete response in the left breast with two negative sentinel nodes. The decision was made to consider radiation because of her pret reatment  internal mammary node on the right and tamoxifen therapy for the hormone positive breast cancer on right. Clinical trials are being looked into.   Additional surgery to remove the lymph node with the clip in the right axilla, 3 nodes removed and negative for metastatic disease.   Worsening plane around her right implant to be evaluated on 9/23  Persistent fat necrosis around right implant 10/24     2.cT1Nx left breast cancer invasive ductal carcinoma, ER/NE positive, HER2 negative. [id number]   8/14 on tamoxifen-switch to Arimidex after oophorectomy in July 2015  5 years of Arimidex +2 years of tamoxifen given adjuvant-discontinued 2021    3. BRCA 1 positive. Post hysterectomy and oophorectomy  4.  Lymphedema right arm improved with a sleeve  5.  Mild depression declines any treatment for this now  6.  Mild anemia due to iron deficiency responding to oral iron  New diagnosis of celiac disease 2022 probable cause of iron deficiency  7.  Osteopenia moderately severe-declined Prolia -Boniva                     was started    Osteoporosis documented in 11/21 Prolia to start Boniva to stop                  8.  Mildly elevated LFTs with negative liver and pancreas ultrasound -                     CAT scan consistent with fatty liver  LFTs mildly elevated on 9/14/2020 3 repeat in 1 to 2 months  LFTs at again elevated in 10/24 follow-up with GI    9.  Exacerbation of hemorrhoidal bleeding due to diarrhea      resolved      10.  Lymphocytic colitis on budesonide-resolved     11. Celiac disease diagnosed by biopsy and serology              Plan  1  Check Ca1 25 annually  2..  MRCP will be next year   3.  Switch to annual Reclast next week  4. . return in 12 months for follow-up with Reclast  5.  Follow-up with plastic surgeon.  And GI Dr. Negro as Dr. Michael has left              10/21/2024      CC:

## 2024-10-24 ENCOUNTER — OFFICE VISIT (OUTPATIENT)
Dept: PLASTIC SURGERY | Facility: CLINIC | Age: 44
End: 2024-10-24
Payer: COMMERCIAL

## 2024-10-24 VITALS
DIASTOLIC BLOOD PRESSURE: 85 MMHG | WEIGHT: 141 LBS | HEIGHT: 60 IN | HEART RATE: 71 BPM | BODY MASS INDEX: 27.68 KG/M2 | OXYGEN SATURATION: 98 % | SYSTOLIC BLOOD PRESSURE: 124 MMHG

## 2024-10-24 DIAGNOSIS — N65.0 DEFORMITY AND DISPROPORTION OF RECONSTRUCTED BREAST: Primary | ICD-10-CM

## 2024-10-24 DIAGNOSIS — N65.1 DEFORMITY AND DISPROPORTION OF RECONSTRUCTED BREAST: Primary | ICD-10-CM

## 2024-10-25 ENCOUNTER — PREP FOR SURGERY (OUTPATIENT)
Dept: OTHER | Facility: HOSPITAL | Age: 44
End: 2024-10-25
Payer: COMMERCIAL

## 2024-10-25 DIAGNOSIS — N65.1 DEFORMITY AND DISPROPORTION OF RECONSTRUCTED BREAST: Primary | ICD-10-CM

## 2024-10-25 DIAGNOSIS — N65.0 DEFORMITY AND DISPROPORTION OF RECONSTRUCTED BREAST: Primary | ICD-10-CM

## 2024-10-25 RX ORDER — TRANEXAMIC ACID 10 MG/ML
1000 INJECTION, SOLUTION INTRAVENOUS
OUTPATIENT
Start: 2024-10-25

## 2024-10-25 RX ORDER — ACETAMINOPHEN 500 MG
1000 TABLET ORAL ONCE
OUTPATIENT
Start: 2024-10-25 | End: 2024-10-25

## 2024-10-25 RX ORDER — SCOLOPAMINE TRANSDERMAL SYSTEM 1 MG/1
1 PATCH, EXTENDED RELEASE TRANSDERMAL CONTINUOUS
OUTPATIENT
Start: 2024-10-25 | End: 2024-10-28

## 2024-11-01 ENCOUNTER — INFUSION (OUTPATIENT)
Dept: ONCOLOGY | Facility: HOSPITAL | Age: 44
End: 2024-11-01
Payer: COMMERCIAL

## 2024-11-01 VITALS
TEMPERATURE: 97.1 F | RESPIRATION RATE: 15 BRPM | SYSTOLIC BLOOD PRESSURE: 120 MMHG | OXYGEN SATURATION: 100 % | WEIGHT: 143 LBS | DIASTOLIC BLOOD PRESSURE: 79 MMHG | BODY MASS INDEX: 28.07 KG/M2 | HEIGHT: 60 IN | HEART RATE: 74 BPM

## 2024-11-01 DIAGNOSIS — Z79.811 LONG TERM CURRENT USE OF AROMATASE INHIBITOR: Primary | ICD-10-CM

## 2024-11-01 DIAGNOSIS — C50.011 BILATERAL MALIGNANT NEOPLASM INVOLVING BOTH NIPPLE AND AREOLA IN FEMALE, UNSPECIFIED ESTROGEN RECEPTOR STATUS: ICD-10-CM

## 2024-11-01 DIAGNOSIS — M81.8 OTHER OSTEOPOROSIS WITHOUT CURRENT PATHOLOGICAL FRACTURE: ICD-10-CM

## 2024-11-01 DIAGNOSIS — M85.869 OSTEOPENIA OF LOWER LEG, UNSPECIFIED LATERALITY: ICD-10-CM

## 2024-11-01 DIAGNOSIS — C50.012 BILATERAL MALIGNANT NEOPLASM INVOLVING BOTH NIPPLE AND AREOLA IN FEMALE, UNSPECIFIED ESTROGEN RECEPTOR STATUS: ICD-10-CM

## 2024-11-01 PROCEDURE — 96374 THER/PROPH/DIAG INJ IV PUSH: CPT

## 2024-11-01 PROCEDURE — 25010000002 ZOLEDRONIC ACID 5 MG/100ML SOLUTION: Performed by: INTERNAL MEDICINE

## 2024-11-01 RX ORDER — ZOLEDRONIC ACID 0.05 MG/ML
5 INJECTION, SOLUTION INTRAVENOUS ONCE
Status: COMPLETED | OUTPATIENT
Start: 2024-11-01 | End: 2024-11-01

## 2024-11-01 RX ADMIN — ZOLEDRONIC ACID 5 MG: 0.05 INJECTION, SOLUTION INTRAVENOUS at 09:56

## 2024-12-19 RX ORDER — SULFAMETHOXAZOLE AND TRIMETHOPRIM 800; 160 MG/1; MG/1
1 TABLET ORAL 2 TIMES DAILY
Qty: 20 TABLET | Refills: 0 | Status: SHIPPED | OUTPATIENT
Start: 2024-12-19 | End: 2025-01-18

## 2024-12-19 RX ORDER — FLUCONAZOLE 100 MG/1
100 TABLET ORAL DAILY
Qty: 4 TABLET | Refills: 0 | Status: SHIPPED | OUTPATIENT
Start: 2024-12-19 | End: 2024-12-23

## 2024-12-19 RX ORDER — AMOXICILLIN AND CLAVULANATE POTASSIUM 500; 125 MG/1; MG/1
1 TABLET, FILM COATED ORAL 3 TIMES DAILY
Qty: 21 TABLET | Refills: 0 | Status: SHIPPED | OUTPATIENT
Start: 2024-12-19 | End: 2024-12-26

## 2024-12-26 ENCOUNTER — TELEPHONE (OUTPATIENT)
Dept: PLASTIC SURGERY | Facility: CLINIC | Age: 44
End: 2024-12-26

## 2024-12-26 RX ORDER — SULFAMETHOXAZOLE AND TRIMETHOPRIM 800; 160 MG/1; MG/1
1 TABLET ORAL 2 TIMES DAILY
Qty: 40 TABLET | Refills: 0 | Status: SHIPPED | OUTPATIENT
Start: 2024-12-26

## 2024-12-26 NOTE — TELEPHONE ENCOUNTER
Spoke to patient this morning. Overall, she feels her condition is improved. She thinks she may have also been coming down with a viral respiratory infection as she reports nasal/sinus congestion, fever (which has since resolved) and general malaise.     She is taking the antibiotics as prescribed and is tolerating well with no reported side effects. She was asking if she still needed to come in today, 12/26, for her check-up. I advised that since her condition is improved, redness is subsiding, no more fevers or body aches, she can defer to her pre-op appt on 01/15, knowing that if anything changes, she can contact us via Lingtt, phone or come into the clinic for a check. I extended her Bactrim DS BID prescription for the full 30 day course and she will finish the Augmentin as prescribed.     She voiced understanding and was in agreement with this plan.

## 2025-01-07 NOTE — H&P (VIEW-ONLY)
No chief complaint on file.            History of Present Illness  Sravani Sky is a 44 y.o. female who presents to [facility] for Pre-Operative Examination for BREAST RECONSTRUCTION REVISION, Bilateral breast reconstruction revision with fat graft, implant exchange and axillary scar release 1/28/25.    Pt presents today for pre op.  She is on bactrim for breast redness. Redness is better but is wondering if she will need to continue bactrim before surgery and along with her other antibiotics being called in.      Subjective        Gluten meal  Allergies Reconciled.    Review of Systems   All review of system has been reviewed and it  is negative except the ones note above.     Objective     There were no vitals taken for this visit.    There is no height or weight on file to calculate BMI.  BMI is >= 25 and <30. 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 Pre-operative examination (Primary)  -     Nicotine Screen, Urine - Urine, Clean Catch; Future        Plan:  Given these options, the patient has verbally expressed an understanding of the risks of surgery and finds these risks acceptable. We will proceed with surgery as soon as possible.    Medications sent to pharmacy  Aware of urine nicotine test        Assessment & Plan  1. Breast augmentation.  The patient will undergo a procedure to increase the size of her breast implants and address soft tissue deformities in the armpit area. The current implants will be replaced with larger ones, and fat grafting will be performed to correct the deformities. The procedure will involve working on the armpits and breast area, with an emphasis on increasing the thickness of  the pocket to prevent rippling. The patient was informed that the recovery period might involve redness and bumpiness due to the fat grafting, which should smooth out within a month. The patient will start a regimen of Tylenol, naproxen, and gabapentin three days prior to the surgery. Chlorhexidine will be used seven days before the surgery and once again on the day of the surgery. Cephalexin will be prescribed as an antibiotic, and Zofran will be available if needed. The patient was advised to continue taking her current antibiotic, Ceftin, until it is finished. A drain will be placed on the right side due to previous radiation treatment, and it is expected to remain in place for 1 to 2 weeks. 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    PROCEDURE  The patient has undergone several surgical procedures in the past, including an implant replacement and attempts to address scar tissue.               Follow Up     No follow-ups on file.    Patient was given instructions and counseling regarding her condition. Please see specific information pulled into the AVS if appropriate.     Sophia Lieberman  01/15/2025      Patient or patient representative verbalized consent for the use of Ambient Listening during the visit with  German Garduno MD for chart documentation. 1/15/2025  10:49 EST

## 2025-01-07 NOTE — PROGRESS NOTES
No chief complaint on file.            History of Present Illness  Sravani Sky is a 44 y.o. female who presents to [facility] for Pre-Operative Examination for BREAST RECONSTRUCTION REVISION, Bilateral breast reconstruction revision with fat graft, implant exchange and axillary scar release 1/28/25.    Pt presents today for pre op.  She is on bactrim for breast redness. Redness is better but is wondering if she will need to continue bactrim before surgery and along with her other antibiotics being called in.      Subjective        Gluten meal  Allergies Reconciled.    Review of Systems   All review of system has been reviewed and it  is negative except the ones note above.     Objective     There were no vitals taken for this visit.    There is no height or weight on file to calculate BMI.  BMI is >= 25 and <30. 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 Pre-operative examination (Primary)  -     Nicotine Screen, Urine - Urine, Clean Catch; Future        Plan:  Given these options, the patient has verbally expressed an understanding of the risks of surgery and finds these risks acceptable. We will proceed with surgery as soon as possible.    Medications sent to pharmacy  Aware of urine nicotine test        Assessment & Plan  1. Breast augmentation.  The patient will undergo a procedure to increase the size of her breast implants and address soft tissue deformities in the armpit area. The current implants will be replaced with larger ones, and fat grafting will be performed to correct the deformities. The procedure will involve working on the armpits and breast area, with an emphasis on increasing the thickness of  the pocket to prevent rippling. The patient was informed that the recovery period might involve redness and bumpiness due to the fat grafting, which should smooth out within a month. The patient will start a regimen of Tylenol, naproxen, and gabapentin three days prior to the surgery. Chlorhexidine will be used seven days before the surgery and once again on the day of the surgery. Cephalexin will be prescribed as an antibiotic, and Zofran will be available if needed. The patient was advised to continue taking her current antibiotic, Ceftin, until it is finished. A drain will be placed on the right side due to previous radiation treatment, and it is expected to remain in place for 1 to 2 weeks. 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    PROCEDURE  The patient has undergone several surgical procedures in the past, including an implant replacement and attempts to address scar tissue.               Follow Up     No follow-ups on file.    Patient was given instructions and counseling regarding her condition. Please see specific information pulled into the AVS if appropriate.     Sophia Lieberman  01/15/2025      Patient or patient representative verbalized consent for the use of Ambient Listening during the visit with  German Garduno MD for chart documentation. 1/15/2025  10:49 EST

## 2025-01-15 ENCOUNTER — OFFICE VISIT (OUTPATIENT)
Dept: PLASTIC SURGERY | Facility: CLINIC | Age: 45
End: 2025-01-15
Payer: COMMERCIAL

## 2025-01-15 ENCOUNTER — LAB (OUTPATIENT)
Facility: HOSPITAL | Age: 45
End: 2025-01-15
Payer: COMMERCIAL

## 2025-01-15 VITALS
OXYGEN SATURATION: 99 % | DIASTOLIC BLOOD PRESSURE: 81 MMHG | HEART RATE: 68 BPM | TEMPERATURE: 98.2 F | WEIGHT: 143 LBS | BODY MASS INDEX: 27.93 KG/M2 | SYSTOLIC BLOOD PRESSURE: 117 MMHG

## 2025-01-15 DIAGNOSIS — Z01.818 PRE-OPERATIVE EXAMINATION: ICD-10-CM

## 2025-01-15 DIAGNOSIS — N65.0 DEFORMITY AND DISPROPORTION OF RECONSTRUCTED BREAST: ICD-10-CM

## 2025-01-15 DIAGNOSIS — Z01.818 PRE-OPERATIVE EXAMINATION: Primary | ICD-10-CM

## 2025-01-15 DIAGNOSIS — N65.1 DEFORMITY AND DISPROPORTION OF RECONSTRUCTED BREAST: ICD-10-CM

## 2025-01-15 LAB — COTININE UR-MCNC: NEGATIVE NG/ML

## 2025-01-15 PROCEDURE — G0480 DRUG TEST DEF 1-7 CLASSES: HCPCS

## 2025-01-15 PROCEDURE — 99024 POSTOP FOLLOW-UP VISIT: CPT | Performed by: SURGERY

## 2025-01-15 RX ORDER — GABAPENTIN 300 MG/1
300 CAPSULE ORAL 3 TIMES DAILY
Qty: 18 CAPSULE | Refills: 0 | Status: SHIPPED | OUTPATIENT
Start: 2025-01-15 | End: 2025-01-21

## 2025-01-15 RX ORDER — CEPHALEXIN 500 MG/1
500 CAPSULE ORAL 4 TIMES DAILY
Qty: 20 CAPSULE | Refills: 0 | Status: SHIPPED | OUTPATIENT
Start: 2025-01-15 | End: 2025-01-20

## 2025-01-15 RX ORDER — CALCIUM POLYCARBOPHIL 625 MG
TABLET ORAL DAILY
COMMUNITY

## 2025-01-15 RX ORDER — ONDANSETRON 4 MG/1
4 TABLET, FILM COATED ORAL DAILY PRN
Qty: 18 TABLET | Refills: 0 | Status: SHIPPED | OUTPATIENT
Start: 2025-01-15

## 2025-01-15 RX ORDER — NAPROXEN 250 MG/1
250 TABLET ORAL 2 TIMES DAILY
Qty: 12 TABLET | Refills: 0 | Status: SHIPPED | OUTPATIENT
Start: 2025-01-15

## 2025-01-15 RX ORDER — ACETAMINOPHEN 500 MG
1000 TABLET ORAL EVERY 6 HOURS PRN
Qty: 18 TABLET | Refills: 0 | Status: SHIPPED | OUTPATIENT
Start: 2025-01-15

## 2025-01-15 RX ORDER — CHLORHEXIDINE GLUCONATE 40 MG/ML
30 SOLUTION TOPICAL DAILY
Qty: 473 ML | Refills: 0 | Status: SHIPPED | OUTPATIENT
Start: 2025-01-15

## 2025-01-16 ENCOUNTER — PATIENT MESSAGE (OUTPATIENT)
Dept: PLASTIC SURGERY | Facility: CLINIC | Age: 45
End: 2025-01-16
Payer: COMMERCIAL

## 2025-01-21 ENCOUNTER — TELEPHONE (OUTPATIENT)
Dept: PLASTIC SURGERY | Facility: CLINIC | Age: 45
End: 2025-01-21

## 2025-01-21 NOTE — TELEPHONE ENCOUNTER
Hub staff attempted to follow warm transfer process and was unsuccessful     Caller: Sravani Sky     Relationship to patient: SELF     Best call back number: [phone]     Patient is needing:  PATIENT CHECKING ON THE STATUS OF HER MEDICAL LEAVE PAPERWORK SHE UPLOADED ON MY CHART

## 2025-01-23 NOTE — PROGRESS NOTES
"  Chief Complaint  Post-op Follow-up (1w post op)    Subjective          History of Present Illness  Sravani Sky is a 44 y.o. female who presents to [facility] for Postoperative Follow-Up of BREAST RECONSTRUCTION REVISION, Bilateral breast reconstruction revision with fat graft, implant exchange and axillary scar release 1/28/25.    Pt presents today for 1w post op. Doing well. Pt has 1 drain intact on right breast with output of.  Right 27/27/18 -hx of radiation 2014    Pt states she has had very little BM since surgery. Pt has been taking Colace 3 a day.      Allergies: Gluten meal  Allergies Reconciled.    Review of Systems   All review of system has been reviewed and it  is negative except the ones note above.     Objective     /69 (BP Location: Left arm, Patient Position: Sitting, Cuff Size: Adult)   Pulse 70   Ht 152.4 cm (60\")   Wt 66.2 kg (146 lb)   SpO2 98%   BMI 28.51 kg/m²     Body mass index is 28.51 kg/m².   BMI is >= 25 and <30. (Overweight) The following options were offered after discussion;: none (medical contraindication)        General Inspection: No acute distress, comfortable.     Breasts - Incisions well approximated, Exofin Fusion tape in place. Expected ecchymosis and swelling. Right drain intact.     Abdomen - Tegaderm dressing in place, fat grafting sites are well approximated. Expected ecchymosis and swelling.     Result Review :                Assessment and Plan      Diagnoses and all orders for this visit:    1. Postoperative follow-up (Primary)        Plan:   Right drain will remain due to high output. Dressing replaced and additional change provided if needed.  Tegaderm dressing removed from abdomen. Post-operative care instructions reinforced today.   Discussed Milk of Magnesia or MagCitrate as stimulant laxative to help with her bowel movements, as well as glycerin suppositories or a Fleet's enema. Also mentioned " dietary help with nuts, cashews, etc.  Patient asked if she could have a few more Oxycodone for pain, and I will pass this request on to Dr. Garduno.  Patient also asked if she could go to Dr. Smith's office to have her drain pulled due to the commute here, however, Dr. Smith has retired.  1 week post-op scheduled.           Follow Up     Return in about 1 week (around 2/11/2025) for probable drain pull and post-op check.    Patient was given instructions and counseling regarding her condition. Please see specific information pulled into the AVS if appropriate.     Kat See PA-C  02/04/2025

## 2025-01-24 NOTE — PRE-PROCEDURE INSTRUCTIONS
PATIENT INSTRUCTED TO BE:    - NOTHING TO EAT AFTER MIDNIGHT OR CHEW, EXCEPT CAN HAVE CLEAR LIQUIDS 2 HOURS PRIOR TO SURGERY ARRIVAL TIME , NO MORE THAN 8 OZ. (NOTHING RED)     - TO HOLD ALL VITAMINS, SUPPLEMENTS, NSAIDS FOR ONE WEEK PRIOR TO THEIR SURGICAL PROCEDURE        - BATHING INSTRUCTIONS GIVEN    INSTRUCTED NO LOTIONS, JEWELRY, PIERCINGS,  NAIL POLISH, OR DEODORANT DAY OF SURGERY        -INSTRUCTED TO TAKE THE FOLLOWING MEDICATIONS THE DAY OF SURGERY WITH SIPS OF WATER:   none        - DO NOT BRING ANY MEDICATIONS WITH YOU TO THE HOSPITAL THE DAY OF SURGERY, EXCEPT IF USE INHALERS. BRING INHALERS DAY OF SURGERY       - BRING CPAP OR BIPAP TO THE HOSPITAL ONLY IF YOU ARE SPENDING THE NIGHT    - DO NOT SMOKE OR VAPE 24 HOURS PRIOR TO PROCEDURE PER ANESTHESIA REQUEST     -MAKE SURE YOU HAVE A RIDE HOME OR SOMEONE TO STAY WITH YOU THE DAY OF THE PROCEDURE AFTER YOU GO HOME     - FOLLOW ANY OTHER INSTRUCTIONS GIVEN TO YOU BY YOUR SURGEON'S OFFICE.     Main Entrance [facility], Take elevator to first floor, turn left and check in at registration  - YOU WILL RECEIVE A PHONE CALL THE DAY PRIOR TO SURGERY BETWEEN 1PM AND 4 PM WITH ARRIVAL TIME, IF YOUR SURGERY IS ON A MONDAY YOU WILL RECEIVE A CALL THE FRIDAY PRIOR TO SURGERY DATE    - BRING CASH OR CREDIT CARD FOR COPAYMENT OF MEDICATIONS AFTER SURGERY IF YOU USE THE HOSPITAL PHARMACY (MEDS TO BED)    - PREADMISSION TESTING NURSE DOYLE DIOR [phone] IF HAVE ANY QUESTIONS     -PATIENT PROVIDED THE NUMBER FOR PREOP SURGICAL DEPT IF HAD QUESTIONS AFTER HOURS PRIOR TO SURGERY ([phone] OR ).  INFORMED PT IF NO ANSWER, LEAVE A MESSAGE AND SOMEONE WILL RETURN THEIR CALL       PATIENT VERBALIZED UNDERSTANDING       Clear Liquid Diet        Find out when you need to start a clear liquid diet.   Think of “clear liquids” as anything you could read a newspaper through. This includes things like water, broth, sports drinks, or tea WITHOUT any kind of  milk or cream.           Once you are told to start a clear liquid diet, only drink these things until 2 hours before arrival to the hospital or when the hospital says to stop. Total volume limitation: 8 oz.       Clear liquids you CAN drink:   Water   Clear broth: beef, chicken, vegetable, or bone broth with nothing in it   Gatorade   Lemonade or Adolfo-aid   Soda   Tea, coffee (NO cream or honey)   Jell-O (without fruit)   Popsicles (without fruit or cream)   Italian ices   Juice without pulp: apple, white, grape   You may use salt, pepper, and sugar  NO RED  NO NOODLES    Do NOT drink:   Milk or cream   Soy milk, almond milk, coconut milk, or other non-dairy drinks and   creamers   Milkshakes or smoothies   Tomato juice   Orange juice   Grapefruit juice   Cream soups or any other than broth         Clear Liquid Diet:  Do NOT eat any solid food.  Do NOT eat or suck on mints or candy.  Do NOT chew gum.  Do NOT drink thick liquids like milk or juice with pulp in it.  Do NOT add milk, cream, or anything like soy milk or almond milk to coffee or tea.

## 2025-01-28 ENCOUNTER — TELEPHONE (OUTPATIENT)
Dept: PLASTIC SURGERY | Facility: CLINIC | Age: 45
End: 2025-01-28

## 2025-01-28 ENCOUNTER — ANESTHESIA (OUTPATIENT)
Dept: PERIOP | Facility: HOSPITAL | Age: 45
End: 2025-01-28
Payer: COMMERCIAL

## 2025-01-28 ENCOUNTER — HOSPITAL ENCOUNTER (OUTPATIENT)
Facility: HOSPITAL | Age: 45
Setting detail: HOSPITAL OUTPATIENT SURGERY
Discharge: HOME | End: 2025-01-28
Attending: SURGERY | Admitting: SURGERY
Payer: COMMERCIAL

## 2025-01-28 ENCOUNTER — ANESTHESIA EVENT (OUTPATIENT)
Dept: PERIOP | Facility: HOSPITAL | Age: 45
End: 2025-01-28
Payer: COMMERCIAL

## 2025-01-28 VITALS
DIASTOLIC BLOOD PRESSURE: 76 MMHG | RESPIRATION RATE: 20 BRPM | HEIGHT: 60 IN | WEIGHT: 144.62 LBS | TEMPERATURE: 98.9 F | OXYGEN SATURATION: 97 % | SYSTOLIC BLOOD PRESSURE: 115 MMHG | HEART RATE: 98 BPM | BODY MASS INDEX: 28.39 KG/M2

## 2025-01-28 DIAGNOSIS — N65.0 DEFORMITY AND DISPROPORTION OF RECONSTRUCTED BREAST: Primary | ICD-10-CM

## 2025-01-28 DIAGNOSIS — N65.1 DEFORMITY AND DISPROPORTION OF RECONSTRUCTED BREAST: Primary | ICD-10-CM

## 2025-01-28 DIAGNOSIS — N65.0 DEFORMITY AND DISPROPORTION OF RECONSTRUCTED BREAST: ICD-10-CM

## 2025-01-28 DIAGNOSIS — N65.1 DEFORMITY AND DISPROPORTION OF RECONSTRUCTED BREAST: ICD-10-CM

## 2025-01-28 LAB — COTININE UR-MCNC: NEGATIVE NG/ML

## 2025-01-28 PROCEDURE — 25810000003 LACTATED RINGERS PER 1000 ML: Performed by: ANESTHESIOLOGY

## 2025-01-28 PROCEDURE — C1789 PROSTHESIS, BREAST, IMP: HCPCS | Performed by: SURGERY

## 2025-01-28 PROCEDURE — 19370 REVJ PERI-IMPLT CAPSULE BRST: CPT | Performed by: SURGERY

## 2025-01-28 PROCEDURE — 25010000002 MIDAZOLAM PER 1MG: Performed by: ANESTHESIOLOGY

## 2025-01-28 PROCEDURE — 25010000002 LIDOCAINE PF 2% 2 % SOLUTION

## 2025-01-28 PROCEDURE — 25010000002 HYDROMORPHONE 1 MG/ML SOLUTION

## 2025-01-28 PROCEDURE — 25010000002 PROPOFOL 10 MG/ML EMULSION

## 2025-01-28 PROCEDURE — 25010000002 SUGAMMADEX 200 MG/2ML SOLUTION

## 2025-01-28 PROCEDURE — 25010000002 ONDANSETRON PER 1 MG

## 2025-01-28 PROCEDURE — 25010000002 EPINEPHRINE (ANAPHYLAXIS) 1 MG/ML SOLUTION: Performed by: SURGERY

## 2025-01-28 PROCEDURE — 25010000002 DEXAMETHASONE PER 1 MG: Performed by: SURGERY

## 2025-01-28 PROCEDURE — 25010000002 DEXAMETHASONE PER 1 MG

## 2025-01-28 PROCEDURE — 19342 INSJ/RPLCMT BRST IMPLT SEP D: CPT

## 2025-01-28 PROCEDURE — 25010000002 DIPHENHYDRAMINE PER 50 MG

## 2025-01-28 PROCEDURE — 15771 GRFG AUTOL FAT LIPO 50 CC/<: CPT | Performed by: SURGERY

## 2025-01-28 PROCEDURE — 19342 INSJ/RPLCMT BRST IMPLT SEP D: CPT | Performed by: SURGERY

## 2025-01-28 PROCEDURE — G0480 DRUG TEST DEF 1-7 CLASSES: HCPCS | Performed by: SURGERY

## 2025-01-28 PROCEDURE — 25010000002 CEFAZOLIN PER 500 MG: Performed by: SURGERY

## 2025-01-28 PROCEDURE — 15772 GRFG AUTOL FAT LIPO EA ADDL: CPT | Performed by: SURGERY

## 2025-01-28 PROCEDURE — 25010000002 BUPIVACAINE (PF) 0.5 % SOLUTION: Performed by: SURGERY

## 2025-01-28 PROCEDURE — 25010000002 FENTANYL CITRATE (PF) 50 MCG/ML SOLUTION

## 2025-01-28 DEVICE — IMPLANTABLE DEVICE: Type: IMPLANTABLE DEVICE | Site: BREAST | Status: FUNCTIONAL

## 2025-01-28 RX ORDER — EPINEPHRINE 1 MG/ML
INJECTION, SOLUTION INTRAMUSCULAR; SUBCUTANEOUS AS NEEDED
Status: DISCONTINUED | OUTPATIENT
Start: 2025-01-28 | End: 2025-01-28 | Stop reason: HOSPADM

## 2025-01-28 RX ORDER — TRANEXAMIC ACID 10 MG/ML
INJECTION, SOLUTION INTRAVENOUS CONTINUOUS PRN
Status: COMPLETED | OUTPATIENT
Start: 2025-01-28 | End: 2025-01-28

## 2025-01-28 RX ORDER — ACETAMINOPHEN 500 MG
1000 TABLET ORAL ONCE
Status: DISCONTINUED | OUTPATIENT
Start: 2025-01-28 | End: 2025-01-28 | Stop reason: HOSPADM

## 2025-01-28 RX ORDER — DIPHENHYDRAMINE HYDROCHLORIDE 50 MG/ML
INJECTION INTRAMUSCULAR; INTRAVENOUS
Status: COMPLETED
Start: 2025-01-28 | End: 2025-01-28

## 2025-01-28 RX ORDER — DEXAMETHASONE SODIUM PHOSPHATE 4 MG/ML
INJECTION, SOLUTION INTRA-ARTICULAR; INTRALESIONAL; INTRAMUSCULAR; INTRAVENOUS; SOFT TISSUE AS NEEDED
Status: DISCONTINUED | OUTPATIENT
Start: 2025-01-28 | End: 2025-01-28 | Stop reason: SURG

## 2025-01-28 RX ORDER — PROMETHAZINE HYDROCHLORIDE 25 MG/1
25 TABLET ORAL ONCE AS NEEDED
Status: DISCONTINUED | OUTPATIENT
Start: 2025-01-28 | End: 2025-01-28 | Stop reason: HOSPADM

## 2025-01-28 RX ORDER — TRANEXAMIC ACID 100 MG/ML
1000 INJECTION, SOLUTION INTRAVENOUS ONCE
Status: DISCONTINUED | OUTPATIENT
Start: 2025-01-28 | End: 2025-01-28 | Stop reason: HOSPADM

## 2025-01-28 RX ORDER — BUPIVACAINE HYDROCHLORIDE 5 MG/ML
INJECTION, SOLUTION EPIDURAL; INTRACAUDAL AS NEEDED
Status: DISCONTINUED | OUTPATIENT
Start: 2025-01-28 | End: 2025-01-28 | Stop reason: HOSPADM

## 2025-01-28 RX ORDER — ONDANSETRON 2 MG/ML
4 INJECTION INTRAMUSCULAR; INTRAVENOUS ONCE AS NEEDED
Status: DISCONTINUED | OUTPATIENT
Start: 2025-01-28 | End: 2025-01-28 | Stop reason: HOSPADM

## 2025-01-28 RX ORDER — MEPERIDINE HYDROCHLORIDE 25 MG/ML
12.5 INJECTION INTRAMUSCULAR; INTRAVENOUS; SUBCUTANEOUS
Status: DISCONTINUED | OUTPATIENT
Start: 2025-01-28 | End: 2025-01-28 | Stop reason: HOSPADM

## 2025-01-28 RX ORDER — ONDANSETRON 2 MG/ML
INJECTION INTRAMUSCULAR; INTRAVENOUS AS NEEDED
Status: DISCONTINUED | OUTPATIENT
Start: 2025-01-28 | End: 2025-01-28 | Stop reason: SURG

## 2025-01-28 RX ORDER — SODIUM CHLORIDE, SODIUM LACTATE, POTASSIUM CHLORIDE, CALCIUM CHLORIDE 600; 310; 30; 20 MG/100ML; MG/100ML; MG/100ML; MG/100ML
9 INJECTION, SOLUTION INTRAVENOUS CONTINUOUS PRN
Status: DISCONTINUED | OUTPATIENT
Start: 2025-01-28 | End: 2025-01-28 | Stop reason: HOSPADM

## 2025-01-28 RX ORDER — LIDOCAINE HYDROCHLORIDE 20 MG/ML
INJECTION, SOLUTION EPIDURAL; INFILTRATION; INTRACAUDAL; PERINEURAL AS NEEDED
Status: DISCONTINUED | OUTPATIENT
Start: 2025-01-28 | End: 2025-01-28 | Stop reason: SURG

## 2025-01-28 RX ORDER — FENTANYL CITRATE 50 UG/ML
INJECTION, SOLUTION INTRAMUSCULAR; INTRAVENOUS AS NEEDED
Status: DISCONTINUED | OUTPATIENT
Start: 2025-01-28 | End: 2025-01-28 | Stop reason: SURG

## 2025-01-28 RX ORDER — SCOPOLAMINE 1 MG/3D
1 PATCH, EXTENDED RELEASE TRANSDERMAL CONTINUOUS
Status: DISCONTINUED | OUTPATIENT
Start: 2025-01-28 | End: 2025-01-28 | Stop reason: HOSPADM

## 2025-01-28 RX ORDER — PROPOFOL 10 MG/ML
VIAL (ML) INTRAVENOUS AS NEEDED
Status: DISCONTINUED | OUTPATIENT
Start: 2025-01-28 | End: 2025-01-28 | Stop reason: SURG

## 2025-01-28 RX ORDER — OXYCODONE HYDROCHLORIDE 5 MG/1
5 TABLET ORAL
Status: COMPLETED | OUTPATIENT
Start: 2025-01-28 | End: 2025-01-28

## 2025-01-28 RX ORDER — OXYCODONE AND ACETAMINOPHEN 5; 325 MG/1; MG/1
1 TABLET ORAL EVERY 6 HOURS PRN
Qty: 30 TABLET | Refills: 0 | Status: SHIPPED | OUTPATIENT
Start: 2025-01-28

## 2025-01-28 RX ORDER — EPHEDRINE SULFATE 50 MG/ML
INJECTION, SOLUTION INTRAVENOUS AS NEEDED
Status: DISCONTINUED | OUTPATIENT
Start: 2025-01-28 | End: 2025-01-28 | Stop reason: SURG

## 2025-01-28 RX ORDER — MIDAZOLAM HYDROCHLORIDE 2 MG/2ML
2 INJECTION, SOLUTION INTRAMUSCULAR; INTRAVENOUS ONCE
Status: COMPLETED | OUTPATIENT
Start: 2025-01-28 | End: 2025-01-28

## 2025-01-28 RX ORDER — SODIUM CHLORIDE, SODIUM LACTATE, POTASSIUM CHLORIDE, AND CALCIUM CHLORIDE .6; .31; .03; .02 G/100ML; G/100ML; G/100ML; G/100ML
INJECTION, SOLUTION INTRAVENOUS AS NEEDED
Status: DISCONTINUED | OUTPATIENT
Start: 2025-01-28 | End: 2025-01-28 | Stop reason: HOSPADM

## 2025-01-28 RX ORDER — DEXAMETHASONE SODIUM PHOSPHATE 4 MG/ML
INJECTION, SOLUTION INTRA-ARTICULAR; INTRALESIONAL; INTRAMUSCULAR; INTRAVENOUS; SOFT TISSUE AS NEEDED
Status: DISCONTINUED | OUTPATIENT
Start: 2025-01-28 | End: 2025-01-28 | Stop reason: HOSPADM

## 2025-01-28 RX ORDER — TRANEXAMIC ACID 10 MG/ML
1000 INJECTION, SOLUTION INTRAVENOUS
Status: COMPLETED | OUTPATIENT
Start: 2025-01-28 | End: 2025-01-28

## 2025-01-28 RX ORDER — MAGNESIUM HYDROXIDE 1200 MG/15ML
LIQUID ORAL AS NEEDED
Status: DISCONTINUED | OUTPATIENT
Start: 2025-01-28 | End: 2025-01-28 | Stop reason: HOSPADM

## 2025-01-28 RX ORDER — PROMETHAZINE HYDROCHLORIDE 25 MG/1
25 SUPPOSITORY RECTAL ONCE AS NEEDED
Status: DISCONTINUED | OUTPATIENT
Start: 2025-01-28 | End: 2025-01-28 | Stop reason: HOSPADM

## 2025-01-28 RX ORDER — ROCURONIUM BROMIDE 10 MG/ML
INJECTION, SOLUTION INTRAVENOUS AS NEEDED
Status: DISCONTINUED | OUTPATIENT
Start: 2025-01-28 | End: 2025-01-28 | Stop reason: SURG

## 2025-01-28 RX ORDER — DIPHENHYDRAMINE HYDROCHLORIDE 50 MG/ML
25 INJECTION INTRAMUSCULAR; INTRAVENOUS ONCE
Status: COMPLETED | OUTPATIENT
Start: 2025-01-28 | End: 2025-01-28

## 2025-01-28 RX ADMIN — MIDAZOLAM HYDROCHLORIDE 2 MG: 1 INJECTION, SOLUTION INTRAMUSCULAR; INTRAVENOUS at 07:16

## 2025-01-28 RX ADMIN — ROCURONIUM BROMIDE 50 MG: 50 INJECTION INTRAVENOUS at 07:30

## 2025-01-28 RX ADMIN — SODIUM CHLORIDE 2000 MG: 9 INJECTION, SOLUTION INTRAVENOUS at 07:38

## 2025-01-28 RX ADMIN — DIPHENHYDRAMINE HYDROCHLORIDE 25 MG: 50 INJECTION, SOLUTION INTRAMUSCULAR; INTRAVENOUS at 10:43

## 2025-01-28 RX ADMIN — SODIUM CHLORIDE, POTASSIUM CHLORIDE, SODIUM LACTATE AND CALCIUM CHLORIDE 9 ML/HR: 600; 310; 30; 20 INJECTION, SOLUTION INTRAVENOUS at 07:14

## 2025-01-28 RX ADMIN — HYDROMORPHONE HYDROCHLORIDE 0.25 MG: 1 INJECTION, SOLUTION INTRAMUSCULAR; INTRAVENOUS; SUBCUTANEOUS at 10:10

## 2025-01-28 RX ADMIN — TRANEXAMIC ACID 1000 MG: 10 INJECTION, SOLUTION INTRAVENOUS at 07:15

## 2025-01-28 RX ADMIN — LIDOCAINE HYDROCHLORIDE 60 MG: 20 INJECTION, SOLUTION INTRAVENOUS at 07:30

## 2025-01-28 RX ADMIN — ONDANSETRON 4 MG: 2 INJECTION INTRAMUSCULAR; INTRAVENOUS at 09:17

## 2025-01-28 RX ADMIN — ROCURONIUM BROMIDE 20 MG: 50 INJECTION INTRAVENOUS at 07:55

## 2025-01-28 RX ADMIN — DIPHENHYDRAMINE HYDROCHLORIDE 25 MG: 50 INJECTION INTRAMUSCULAR; INTRAVENOUS at 10:43

## 2025-01-28 RX ADMIN — HYDROMORPHONE HYDROCHLORIDE 0.5 MG: 1 INJECTION, SOLUTION INTRAMUSCULAR; INTRAVENOUS; SUBCUTANEOUS at 09:33

## 2025-01-28 RX ADMIN — FENTANYL CITRATE 25 MCG: 50 INJECTION, SOLUTION INTRAMUSCULAR; INTRAVENOUS at 07:52

## 2025-01-28 RX ADMIN — SODIUM CHLORIDE, POTASSIUM CHLORIDE, SODIUM LACTATE AND CALCIUM CHLORIDE: 600; 310; 30; 20 INJECTION, SOLUTION INTRAVENOUS at 09:24

## 2025-01-28 RX ADMIN — PROPOFOL 130 MG: 10 INJECTION, EMULSION INTRAVENOUS at 07:30

## 2025-01-28 RX ADMIN — FENTANYL CITRATE 25 MCG: 50 INJECTION, SOLUTION INTRAMUSCULAR; INTRAVENOUS at 07:30

## 2025-01-28 RX ADMIN — ROCURONIUM BROMIDE 20 MG: 50 INJECTION INTRAVENOUS at 08:45

## 2025-01-28 RX ADMIN — EPHEDRINE SULFATE 5 MG: 50 INJECTION INTRAVENOUS at 09:22

## 2025-01-28 RX ADMIN — SCOLOPAMINE TRANSDERMAL SYSTEM 1 PATCH: 1 PATCH, EXTENDED RELEASE TRANSDERMAL at 07:14

## 2025-01-28 RX ADMIN — SUGAMMADEX 200 MG: 100 INJECTION, SOLUTION INTRAVENOUS at 09:30

## 2025-01-28 RX ADMIN — DEXAMETHASONE SODIUM PHOSPHATE 8 MG: 4 INJECTION, SOLUTION INTRAMUSCULAR; INTRAVENOUS at 07:42

## 2025-01-28 RX ADMIN — FENTANYL CITRATE 50 MCG: 50 INJECTION, SOLUTION INTRAMUSCULAR; INTRAVENOUS at 07:58

## 2025-01-28 RX ADMIN — OXYCODONE 5 MG: 5 TABLET ORAL at 10:20

## 2025-01-28 RX ADMIN — OXYCODONE 5 MG: 5 TABLET ORAL at 12:37

## 2025-01-28 NOTE — TELEPHONE ENCOUNTER
Great thanks. Notified pt and she is asking if it is ok if she wears a binder to help with the pain.    Thanks

## 2025-01-28 NOTE — DISCHARGE INSTRUCTIONS
DISCHARGE INSTRUCTIONS  BREAST SURGERY           For your surgery you had:  General anesthesia (you may have a sore throat for the first 24 hours)  Local anesthesia  You received a medicated patch for nausea prevention today (behind your ear). It is recommended that you remove it 24-48 hours post-operatively. It must be removed within 72 hours.   You have received an anesthesia medication today that can cause hormonal forms of birth control to be ineffective. You should use a different form of birth control (to prevent pregnancy) for 7 days.   You may experience dizziness, drowsiness, or light-headedness for several hours following surgery/procedure.  Do not stay alone today or tonight.  Limit your activity for 24 hours.  You should not drive, operate machinery, drink alcohol, or sign legally binding documents for 24 hours or while you are taking pain medication.  Resume your diet slowly.  Follow whatever special dietary instructions you may have been given by your doctor.    NOTIFY YOUR DOCTOR IF YOU EXPERIENCE ANY OF THE FOLLOWING:  Temperature greater than 101 degrees Fahrenheit  Shaking Chills  Redness or excessive drainage from incision  Nausea, vomiting and/or pain that is not controlled by prescribed medications  Increase in bleeding or bleeding that is excessive  Unable to urinate in 6 hours after surgery  If unable to reach your doctor, please go to the closest Emergency Room    Do not do any heavy lifting, strenuous activity until released by MD.  After your surgery you may notice some bruising.  This is normal.  Medications per physician instructions as indicated on After Visit Summary.    Last dose of pain medication was given at:      OXYCODONE 5 MG AT 10:20 AM    BENADRYL 25 MG IV AT 10:43    SPECIAL INSTRUCTIONS:  Ok for regular diet  Do not drive for next 2 weeks  Ok to shower in 3 days but be aware you are a fall risk so have someone with you or place a chair in the shower. It is ok to wet the  breast.   Keep dressings over the drains intact.  No sponge or cloths. Wash the drain site before other parts of the body  Strip drains q 8 hours and record drain output daily. Wash hands or wear gloves when manipulating drains.  Do not use your under garments to secure your drains.   Do not exercise for the next 6 weeks.  Sleep in an upright position  No bra for 1 week. After that, wear a comfortable soft bra  Ok to move arms slowly to the shoulder level (brushing hair movement)  Do not fly for 2 months    Take post-operative medications as directed on the bottle.     If you experience any issues or concerns, please         contact the office at [phone]. If after hours          a call service will notify the on-call provider.

## 2025-01-28 NOTE — ANESTHESIA POSTPROCEDURE EVALUATION
Patient: Sravani Sky    Procedure Summary       Date: 01/28/25 Room / Location: [facility] OR  / [facility] MAIN OR    Anesthesia Start: 0725 Anesthesia Stop: 0949    Procedures:       BREAST RECONSTRUCTION REVISION, Bilateral breast reconstruction revision with fat graft, implant exchange and axillary scar release (Bilateral: Breast)      BREAST RECONSTRUCTION REVISION, Bilateral breast reconstruction revision with fat graft, implant exchange and axillary scar release (Bilateral)      BREAST RECONSTRUCTION REVISION, Bilateral breast reconstruction revision with fat graft, implant exchange and axillary scar release (Bilateral: Breast) Diagnosis:       Deformity and disproportion of reconstructed breast      (Deformity and disproportion of reconstructed breast [N65.0, N65.1])    Surgeons: German Garduno MD Provider: Cruz Mills MD    Anesthesia Type: general ASA Status: 2            Anesthesia Type: general    Vitals  Vitals Value Taken Time   /85 01/28/25 1036   Temp 36.3 °C (97.3 °F) 01/28/25 1030   Pulse 59 01/28/25 1040   Resp 15 01/28/25 1030   SpO2 99 % 01/28/25 1040   Vitals shown include unfiled device data.        Post Anesthesia Care and Evaluation    Patient location during evaluation: bedside  Patient participation: complete - patient participated  Level of consciousness: awake    Airway patency: patent  PONV Status: none  Cardiovascular status: acceptable  Respiratory status: acceptable  Hydration status: acceptable    Comments: Patient with pruritus treated with benadryl.

## 2025-01-28 NOTE — NURSING NOTE
Pt states she is itching after the pain medications. Pt states this is a normal reaction for her and she gets itchy after having pain medications. Pt states she normally gets benadryl for it. Dr. Mills informed. Order for IV bendadryl given.

## 2025-01-28 NOTE — ANESTHESIA PREPROCEDURE EVALUATION
Anesthesia Evaluation     Patient summary reviewed and Nursing notes reviewed   no history of anesthetic complications:   NPO Solid Status: > 8 hours  NPO Liquid Status: > 2 hours           Airway   Mallampati: I  TM distance: >3 FB  Neck ROM: full  No difficulty expected  Dental - normal exam     Pulmonary - negative pulmonary ROS and normal exam    breath sounds clear to auscultation  Cardiovascular - negative cardio ROS and normal exam  Exercise tolerance: excellent (>7 METS)    Rhythm: regular  Rate: normal        Neuro/Psych- negative ROS  GI/Hepatic/Renal/Endo    (+) liver disease fatty liver disease    Musculoskeletal     Abdominal    Substance History      OB/GYN          Other      history of cancer remission    ROS/Med Hx Other: PAT Nursing Notes unavailable.               Anesthesia Plan    ASA 2     general     (Patient understands anesthesia not responsible for dental damage.    Took tylenol this am  Scop ordered by surgeon )  intravenous induction     Anesthetic plan, risks, benefits, and alternatives have been provided, discussed and informed consent has been obtained with: patient.    Plan discussed with CRNA.    CODE STATUS:

## 2025-01-28 NOTE — OP NOTE
Plastic Surgery Op Note  BILATERAL BREAST RECONSTRUCTION REVISION WITH FAT GRAFT, BILATERAL CAPSULOTOMY, IMPLANT REPLACEMENT    Sravani Sky  1/28/2025    Pre-op Diagnosis:   Deformity and disproportion of reconstructed breast [N65.0, N65.1]    Post-Op Diagnosis Codes:     * Deformity and disproportion of reconstructed breast [N65.0, N65.1]      Anesthesia: General    Staff:   Circulator: Joseph Torres RN; Juliet Lino RN  Scrub Person: Sandra Mcnair  Assistant: Jaky Sierra RN CSA    Surgeon: Dr Garduno  First Assistant: CECILIA Nix who was instrumental in helping with hemostasis, visualization and retraction structures as well as surgical closure.  Her skilled assistance was necessary for the success of this case.      Estimated Blood Loss: 50 mL    Specimens:   Order Name Source Comment Collection Info Order Time   NICOTINE SCREEN, URINE Urine, Clean Catch  Collected By: Simone April 1/28/2025  6:15 AM     Release to patient   Routine Release        TISSUE PATHOLOGY EXAM Breast, Left Left breast implant Collected By: German Garduno MD 1/28/2025  9:33 AM     Release to patient   Routine Release              Drains:   Closed/Suction Drain 1 Right Breast Bulb 15 Fr. (Active)   Dressing Status Clean;Dry;Intact 01/28/25 0951   Drainage Appearance Bloody 01/28/25 0951   Status To bulb suction 01/28/25 0951       [REMOVED] Closed/Suction Drain 1 Inferior;Lateral;Right Breast Bulb 15 Fr. (Removed)       [REMOVED] Closed/Suction Drain 2 Inferior;Lateral;Left Breast Bulb 15 Fr. (Removed)       Findings:   No unexpected findings  Fat graft: 250 cc     Implants:   Explanted:  Right:   Sientra smooth round gel implant [id number]        Left:  Sientra smooth round gel implant [id number]      Implanted:  Right:   Sientra Smooth round gel implant [id number]        Left:  Sientra Smooth round gel implant [id number]        Complications: none     After risks and benefits were  discussed with patient and informed consent was signed, patient was taken to the OR and positioned supine. The surgical site was then prepped in a sterile fashion way and a time out was performed confirming patient's name and procedure to be performed. All surgical team was introduced by name.   I started with tumescent injection over the abdomen and with a cannula #4, lipoaspirate was obtained and fat was trapped on a fat graft system. While the fat was being processed, I proceeded with the remaining of the surgery.  I started incising both breasts at the same previous incisions. Then, I removed the old implants and inspected the cavities. No abnormalities were observed. Then, I performed bilateral capsulotomies and fat graft. A sizer was placed to check for the pocket. More fat graft was performed. Then, I performed bilateral nerve blocks, a drain was placed on the right, the pockets were irrrigated with irrisept and the implants placed with a arango funnel. Then,the pocket was closed with 2-0 PDS followed by 3-0 vicryl. A surgical glue system was applied.     Patient tolerated procedure well, there were no complications, all instruments were checked and patient was awakened and taken to PACU in stable condition.  I was present for the entire procedure.     Date: 1/28/2025  Time: 10:08 HUONG Garduno MD  01/28/25  10:08 HUONG

## 2025-01-30 DIAGNOSIS — Z01.818 PRE-OPERATIVE EXAMINATION: ICD-10-CM

## 2025-01-30 RX ORDER — NAPROXEN 250 MG/1
250 TABLET ORAL 2 TIMES DAILY
Qty: 12 TABLET | Refills: 0 | Status: SHIPPED | OUTPATIENT
Start: 2025-01-30

## 2025-01-30 RX ORDER — ACETAMINOPHEN 500 MG
TABLET ORAL
Qty: 18 TABLET | Refills: 0 | Status: SHIPPED | OUTPATIENT
Start: 2025-01-30

## 2025-01-30 RX ORDER — GABAPENTIN 300 MG/1
CAPSULE ORAL
Qty: 18 CAPSULE | Refills: 0 | Status: SHIPPED | OUTPATIENT
Start: 2025-01-30

## 2025-02-04 ENCOUNTER — OFFICE VISIT (OUTPATIENT)
Dept: PLASTIC SURGERY | Facility: CLINIC | Age: 45
End: 2025-02-04
Payer: COMMERCIAL

## 2025-02-04 VITALS
BODY MASS INDEX: 28.66 KG/M2 | HEIGHT: 60 IN | SYSTOLIC BLOOD PRESSURE: 106 MMHG | OXYGEN SATURATION: 98 % | WEIGHT: 146 LBS | HEART RATE: 70 BPM | DIASTOLIC BLOOD PRESSURE: 69 MMHG

## 2025-02-04 DIAGNOSIS — Z09 POSTOPERATIVE FOLLOW-UP: Primary | ICD-10-CM

## 2025-02-05 ENCOUNTER — TELEPHONE (OUTPATIENT)
Dept: PLASTIC SURGERY | Facility: CLINIC | Age: 45
End: 2025-02-05
Payer: COMMERCIAL

## 2025-02-05 DIAGNOSIS — N65.1 DEFORMITY AND DISPROPORTION OF RECONSTRUCTED BREAST: ICD-10-CM

## 2025-02-05 DIAGNOSIS — Z01.818 PRE-OPERATIVE EXAMINATION: ICD-10-CM

## 2025-02-05 DIAGNOSIS — N65.0 DEFORMITY AND DISPROPORTION OF RECONSTRUCTED BREAST: ICD-10-CM

## 2025-02-05 RX ORDER — GABAPENTIN 300 MG/1
300 CAPSULE ORAL 3 TIMES DAILY
Qty: 18 CAPSULE | Refills: 0 | Status: SHIPPED | OUTPATIENT
Start: 2025-02-05

## 2025-02-05 RX ORDER — NAPROXEN 250 MG/1
250 TABLET ORAL 2 TIMES DAILY
Qty: 12 TABLET | Refills: 0 | Status: SHIPPED | OUTPATIENT
Start: 2025-02-05

## 2025-02-05 RX ORDER — OXYCODONE AND ACETAMINOPHEN 5; 325 MG/1; MG/1
1 TABLET ORAL EVERY 6 HOURS PRN
Qty: 30 TABLET | Refills: 0 | Status: SHIPPED | OUTPATIENT
Start: 2025-02-05

## 2025-02-05 NOTE — TELEPHONE ENCOUNTER
Patient calls and is wanting a refill on all of her pre op medications. She's wanting oxycodone called in, gabapentin, naproxen and tylenol. She states she is still having quite a bit of pain. She is wanting these called into [facility] pharmacy on [address] in [place]. She is wanting a call once this is done.    Thank you.

## 2025-02-07 ENCOUNTER — TELEPHONE (OUTPATIENT)
Dept: PLASTIC SURGERY | Facility: CLINIC | Age: 45
End: 2025-02-07

## 2025-02-07 ENCOUNTER — OFFICE VISIT (OUTPATIENT)
Dept: PLASTIC SURGERY | Facility: CLINIC | Age: 45
End: 2025-02-07
Payer: COMMERCIAL

## 2025-02-07 VITALS
SYSTOLIC BLOOD PRESSURE: 118 MMHG | OXYGEN SATURATION: 98 % | DIASTOLIC BLOOD PRESSURE: 79 MMHG | HEIGHT: 60 IN | WEIGHT: 143 LBS | BODY MASS INDEX: 28.07 KG/M2 | HEART RATE: 73 BPM | TEMPERATURE: 98.6 F

## 2025-02-07 DIAGNOSIS — Z09 POSTOPERATIVE FOLLOW-UP: Primary | ICD-10-CM

## 2025-02-07 NOTE — PROGRESS NOTES
"Chief Complaint  Post-op Follow-up (Possible drain removal)    Subjective          History of Present Illness  Sravani Sky is a 44 y.o. female who presents to [facility] for Postoperative Follow-Up of BREAST RECONSTRUCTION REVISION, Bilateral breast reconstruction revision with fat graft, implant exchange and axillary scar release 1/28/25.    Pt presents today for possible drain removal.    Drain out put is 20/20/10 and barely anything today. Pt states the drain site is also getting irritated. Does have some bruising on right hip and possible hematoma.    Allergies: Gluten meal  Allergies Reconciled.    Review of Systems   All review of system has been reviewed and it  is negative except the ones note above.     Objective     /79 (BP Location: Left arm, Patient Position: Sitting, Cuff Size: Adult)   Pulse 73   Temp 98.6 °F (37 °C) (Temporal)   Ht 152.4 cm (60\")   Wt 64.9 kg (143 lb)   SpO2 98%   BMI 27.93 kg/m²     Body mass index is 27.93 kg/m².   BMI is >= 25 and <30. (Overweight) The following options were offered after discussion;: none (medical contraindication)        General Inspection: No acute distress, comfortable.     Right breast - Incision healing well, no areas of dehiscence, resolving ecchymosis and swelling. Drain is intact.  Left Breast - resolving ecchymosis and swelling. Incision is intact, no areas of dehiscence.     Abdomen - resolving ecchymosis and swelling. Likely hematoma on Right flank.    Result Review :                Assessment and Plan      Diagnoses and all orders for this visit:    1. Postoperative follow-up (Primary)        Plan:   - Right drain removed due to lowered output and discomfort. Patient also has h/o multiple surgeries in the past with low fluid output and does not recall repeated post-surgical aspirations or seromas.  - She will f/u in 1 week for recheck.           Follow Up     Return in about 1 week " (around 2/14/2025) for Fluid check, postop.    Patient was given instructions and counseling regarding her condition. Please see specific information pulled into the AVS if appropriate.     Kat See PA-C  02/07/2025

## 2025-02-14 ENCOUNTER — OFFICE VISIT (OUTPATIENT)
Dept: PLASTIC SURGERY | Facility: CLINIC | Age: 45
End: 2025-02-14
Payer: COMMERCIAL

## 2025-02-14 VITALS
HEART RATE: 76 BPM | SYSTOLIC BLOOD PRESSURE: 107 MMHG | BODY MASS INDEX: 27.92 KG/M2 | HEIGHT: 60 IN | WEIGHT: 142.2 LBS | DIASTOLIC BLOOD PRESSURE: 75 MMHG | OXYGEN SATURATION: 98 %

## 2025-02-14 DIAGNOSIS — Z09 POSTOPERATIVE FOLLOW-UP: Primary | ICD-10-CM

## 2025-02-14 NOTE — PROGRESS NOTES
"Chief Complaint  Post-op Follow-up (2 week post op)    Subjective          Initial evaluation  Sravani Sky is a 45 y.o. female who presents to [facility] for Postoperative Follow-Up of BREAST RECONSTRUCTION REVISION, Bilateral breast reconstruction revision with fat graft, implant exchange and axillary scar release 1/28/25.    She is 2 week post op. Is doing better with pain. Has only been taking pain medication at night to help her sleep. Feels their is fluid on left breast.      History of Present Illness  The patient presents for post-op evaluation and fluid check.     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         Allergies: Gluten meal  Allergies Reconciled.    Review of Systems   All review of system has been reviewed and it  is negative except the ones note above.     Objective     /75 (BP Location: Left arm, Patient Position: Sitting, Cuff Size: Adult)   Pulse 76   Ht 152.4 cm (60\")   Wt 64.5 kg (142 lb 3.2 oz)   SpO2 98%   BMI 27.77 kg/m²     Body mass index is 27.77 kg/m².   BMI is >= 25 and <30. (Overweight) The following options were offered after discussion;: none (medical contraindication)        General Inspection: No acute distress, comfortable.     Breasts - Incisions healing well, resolving ecchymosis and swelling. No areas of dehiscence. No clinical evidence of fluid.     Abdomen - resolving ecchymosis and swelling. Hematoma Right posterior hip.          Result Review :                Assessment and Plan      Diagnoses and all orders for this visit:    1. Postoperative follow-up (Primary)        Plan:        Post-op follow up  - Bedside U/S used to evaluate for fluid bilaterally. Small amount of fluid accumulation noted near the lower outer quadrant of her implants bilaterally. Aspiration attempted on Left side, but no fluid aspirated. Attempt was painful for patient.   - Body " expected to absorb some fluid naturally  - Follow-up appointment with Dr. Garduno in 2 weeks or sooner if condition changes (redness, increased tightness or discomfort)  - Suggested using a heating pad for 20 minutes twice daily to promote blood flow and expedite hematoma resolution on her Right posterior hip.   - Can resume driving if not taking pain medication  - Restart multivitamin regimen to help restore iron levels (hemoglobin and hematocrit levels typically decrease post-surgery)  - Advised patient she may return to work on her anticipated date as long as she feels able.     Follow-up  - Patient to follow up in 2 weeks with Dr. Garduno.           Follow Up     Return in about 2 weeks (around 2/28/2025) for postop.    Patient was given instructions and counseling regarding her condition. Please see specific information pulled into the AVS if appropriate.     Kat See PA-C  02/14/2025      Patient or patient representative verbalized consent for the use of Ambient Listening during the visit with  Kat See PA-C for chart documentation. 2/15/2025  18:37 EST

## 2025-02-20 NOTE — PROGRESS NOTES
"Chief Complaint  Post-op Follow-up (1m post op)    Subjective          Initial evaluation  Sravani Sky is a 45 y.o. female who presents to [facility] for Postoperative Follow-Up of BREAST RECONSTRUCTION REVISION, Bilateral breast reconstruction revision with fat graft, implant exchange and axillary scar release 1/28/25.    She is 1m post op. She states constant pain on her abdomen and sides that she rates at a 4/10. Has been taking tylenol and naproxen. Right breast has sharp pains on two areas with touch. No open areas.          History of Present Illness      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         Allergies: Gluten meal  Allergies Reconciled.    Review of Systems   All review of system has been reviewed and it  is negative except the ones note above.     Objective     /84 (BP Location: Left arm, Patient Position: Sitting, Cuff Size: Adult)   Pulse 71   Temp 98.6 °F (37 °C) (Temporal)   Ht 152.4 cm (60\")   Wt 64.7 kg (142 lb 9.6 oz)   SpO2 98%   BMI 27.85 kg/m²     Body mass index is 27.85 kg/m².   BMI is >= 25 and <30. (Overweight) The following options were offered after discussion;: none (medical contraindication)        General Inspection: No acute distress, comfortable.     Breasts much better still with some indentation at the right  axillary tail and axilla          Result Review :                Assessment and Plan      Diagnoses and all orders for this visit:    1. Deformity and disproportion of reconstructed breast (Primary)          Plan:        I will plan to do a subcision on the right axillary tail and axilla - iop - 15 min   89892 scar revision trunk             Follow Up     No follow-ups on file.    Patient was given instructions and counseling regarding her condition. Please see specific information pulled into the AVS if appropriate.     German Garduno, " MD  02/27/2025      Patient or patient representative verbalized consent for the use of Ambient Listening during the visit with  German Garduno MD for chart documentation. 2/28/2025  18:37 EST

## 2025-02-27 ENCOUNTER — OFFICE VISIT (OUTPATIENT)
Dept: PLASTIC SURGERY | Facility: CLINIC | Age: 45
End: 2025-02-27
Payer: COMMERCIAL

## 2025-02-27 VITALS
WEIGHT: 142.6 LBS | HEART RATE: 71 BPM | DIASTOLIC BLOOD PRESSURE: 84 MMHG | TEMPERATURE: 98.6 F | BODY MASS INDEX: 28 KG/M2 | HEIGHT: 60 IN | OXYGEN SATURATION: 98 % | SYSTOLIC BLOOD PRESSURE: 121 MMHG

## 2025-02-27 DIAGNOSIS — N65.0 DEFORMITY AND DISPROPORTION OF RECONSTRUCTED BREAST: Primary | ICD-10-CM

## 2025-02-27 DIAGNOSIS — N65.1 DEFORMITY AND DISPROPORTION OF RECONSTRUCTED BREAST: Primary | ICD-10-CM

## 2025-02-27 PROCEDURE — 99024 POSTOP FOLLOW-UP VISIT: CPT | Performed by: SURGERY

## 2025-08-19 ENCOUNTER — TELEPHONE (OUTPATIENT)
Dept: ONCOLOGY | Facility: CLINIC | Age: 45
End: 2025-08-19
Payer: COMMERCIAL

## (undated) DEVICE — SYR LL TP 10ML STRL

## (undated) DEVICE — 450 ML BOTTLE OF 0.05% CHLORHEXIDINE GLUCONATE IN 99.95% STERILE WATER FOR IRRIGATION, USP AND APPLICATOR.: Brand: IRRISEPT ANTIMICROBIAL WOUND LAVAGE

## (undated) DEVICE — DRSNG SURESITE WNDW 4X4.5

## (undated) DEVICE — SYS FAT GRAFTING REVOLVE SGL

## (undated) DEVICE — SLV SCD KN/LEN ADJ EXPRSS BLENDED MD 1P/U

## (undated) DEVICE — TUBING, SUCTION, 1/4" X 10', STRAIGHT: Brand: MEDLINE

## (undated) DEVICE — SUT MNCRYL PLS ANTIB UD 4/0 PS2 18IN

## (undated) DEVICE — PLASTIC MAJOR-LF: Brand: MEDLINE INDUSTRIES, INC.

## (undated) DEVICE — INTENDED FOR TISSUE SEPARATION, AND OTHER PROCEDURES THAT REQUIRE A SHARP SURGICAL BLADE TO PUNCTURE OR CUT.: Brand: BARD-PARKER ® CARBON RIB-BACK BLADES

## (undated) DEVICE — GLOVE,SURG,SENSICARE SLT,LF,PF,5.5: Brand: MEDLINE

## (undated) DEVICE — MARKER,SKIN,WI/RULER AND LABELS: Brand: MEDLINE

## (undated) DEVICE — SYS CLS SKIN PREMIERPRO EXOFINFUSION 22CM

## (undated) DEVICE — Device

## (undated) DEVICE — LINER CANSTR SXN QUICKFIT 3000CC

## (undated) DEVICE — STPLR SKIN VISISTAT WD 35CT

## (undated) DEVICE — ADHS SKIN PREMIERPRO EXOFIN TOPICAL HI/VISC .5ML

## (undated) DEVICE — BNDR ABD 4PANEL 12IN 46 TO 62IN

## (undated) DEVICE — CAP CONN RED

## (undated) DEVICE — THE STERILE LIGHT HANDLE COVER IS USED WITH STERIS SURGICAL LIGHTING AND VISUALIZATION SYSTEMS.

## (undated) DEVICE — PENCL E/S SMOKEEVAC W/TELESCP CANN

## (undated) DEVICE — SUT VIC 3/0 SH 27IN J416H

## (undated) DEVICE — DRSNG PAD ABD 8X10IN STRL

## (undated) DEVICE — DRSNG WND BORDR/ADHS NONADHR/GZ LF 2X2IN STRL

## (undated) DEVICE — STERILE POLYISOPRENE POWDER-FREE SURGICAL GLOVES WITH EMOLLIENT COATING: Brand: PROTEXIS

## (undated) DEVICE — MARKR SKIN W/RULR AND LBL

## (undated) DEVICE — NON-WOVEN ADHESIVE WOUND DRESSING: Brand: PRIMAPORE ADHESIVE WOUND DRSG 7.2*5CM

## (undated) DEVICE — ANTIBACTERIAL UNDYED BRAIDED (POLYGLACTIN 910), SYNTHETIC ABSORBABLE SUTURE: Brand: COATED VICRYL

## (undated) DEVICE — ADHS SKIN DERMABOND TOP ADVANCED

## (undated) DEVICE — PROXIMATE RH ROTATING HEAD SKIN STAPLERS (35 WIDE) CONTAINS 35 STAINLESS STEEL STAPLES: Brand: PROXIMATE

## (undated) DEVICE — SYR CATH/TIP 50ML 2OZ STRL 1P/U

## (undated) DEVICE — RETRACTOR 175X30MM WITH BUILT-IN SINGLE-USE MULTI-LED LIGHT SOURCE - STERILE: Brand: ONETRAC LX

## (undated) DEVICE — BITEBLOCK OMNI BLOC

## (undated) DEVICE — SUT MNCRYL 5/0 P3 18 IN Y493G

## (undated) DEVICE — DRSNG SURG AQUACEL AG/ADVNTGE 9X15CM 3.5X6IN

## (undated) DEVICE — GLV SURG SENSICARE SLT PF LF 5.5 STRL

## (undated) DEVICE — ELECTRD BLD EDGE/INSUL1P 2.4X5.1MM STRL

## (undated) DEVICE — Device: Brand: MICROAIRE®

## (undated) DEVICE — THE CARR-LOCKE INJECTION NEEDLE IS A SINGLE USE, DISPOSABLE, FLEXIBLE SHEATH INJECTION NEEDLE USED FOR THE INJECTION OF VARIOUS TYPES OF MEDIA THROUGH FLEXIBLE ENDOSCOPES.

## (undated) DEVICE — PICO 15CM X 15CM US CTN1: Brand: PICO 14

## (undated) DEVICE — SOL LR 1000ML

## (undated) DEVICE — DECANTER: Brand: UNBRANDED

## (undated) DEVICE — NDL SPINE 20G 3 1/2 YEL STRL 1P/U

## (undated) DEVICE — SYR LUERLOK 20CC BX/50

## (undated) DEVICE — TBG SXN LIPO 3/8IDX5/8IN ODX10FT DISP

## (undated) DEVICE — GOWN,NON-REINFORCED,SIRUS,SET IN SLV,XL: Brand: MEDLINE

## (undated) DEVICE — BLAKE SILICONE DRAIN, 15 FR ROUND, HUBLESS WITH 3/16" TROCAR: Brand: BLAKE

## (undated) DEVICE — DEV DEL BRST/IMP GEL KELLER2 FUNNEL EA/5

## (undated) DEVICE — GOWN,REINFORCE,POLY,SIRUS,BREATH SLV,XLG: Brand: MEDLINE

## (undated) DEVICE — Device: Brand: DEFENDO AIR/WATER/SUCTION AND BIOPSY VALVE

## (undated) DEVICE — SNAR POLYP SENSATION STDOVL 27 240 BX40

## (undated) DEVICE — BANDAGE,GAUZE,BULKEE II,4.5"X4.1YD,STRL: Brand: MEDLINE

## (undated) DEVICE — JACKSON-PRATT 100CC BULB RESERVOIR: Brand: CARDINAL HEALTH

## (undated) DEVICE — SYR LUERLOK 50ML

## (undated) DEVICE — CANN O2 ETCO2 FITS ALL CONN CO2 SMPL A/ 7IN DISP LF

## (undated) DEVICE — SUT PROLN 4/0 P3 18IN 8699G

## (undated) DEVICE — SYR LUERLOK 30CC

## (undated) DEVICE — FRCP BX RADJAW4 NDL 2.8 240CM LG OG BX40

## (undated) DEVICE — ABDOMINAL BINDER: Brand: DEROYAL

## (undated) DEVICE — IRRIGATOR BULB ASEPTO 60CC STRL

## (undated) DEVICE — SUT ETHLN 3-0 FS118IN 663H

## (undated) DEVICE — CANNULA,OXY,ADULT,SOFT-TOUCH,25 TUBE,UC: Brand: MEDLINE

## (undated) DEVICE — ASPIRATION TUBING SET, DISPOSABLE: Brand: MICROAIRE®

## (undated) DEVICE — BNDG ELAS ELITE V/CLOSE 6IN 5YD LF STRL

## (undated) DEVICE — 3M™ STERI-STRIP™ REINFORCED ADHESIVE SKIN CLOSURES, R1547, 1/2 IN X 4 IN (12 MM X 100 MM), 6 STRIPS/ENVELOPE: Brand: 3M™ STERI-STRIP™

## (undated) DEVICE — LN SMPL CO2 SHTRM SD STREAM W/M LUER

## (undated) DEVICE — APPL CHLORAPREP W/TINT 26ML ORNG

## (undated) DEVICE — ELECTRD BLD EXT EDGE/INSUL 6IN

## (undated) DEVICE — PK UNIV COMPL 40

## (undated) DEVICE — GLV SURG SENSICARE SLT PF LF 6.5 STRL

## (undated) DEVICE — BIOPATCH™ ANTIMICROBIAL DRESSING WITH CHLORHEXIDINE GLUCONATE IS A HYDROPHILLIC POLYURETHANE ABSORPTIVE FOAM WITH CHLORHEXIDINE GLUCONATE (CHG) WHICH INHIBITS BACTERIAL GROWTH UNDER THE DRESSING. THE DRESSING IS INTENDED TO BE USED TO ABSORB EXUDATE, COVER A WOUND CAUSED BY VASCULAR AND NONVASCULAR PERCUTANEOUS MEDICAL DEVICES DURING SURGERY, AS WELL AS REDUCE LOCAL INFECTION AND COLONIZATION OF MICROORGANISMS.: Brand: BIOPATCH

## (undated) DEVICE — STANDARD HYPODERMIC NEEDLE,POLYPROPYLENE HUB: Brand: MONOJECT

## (undated) DEVICE — THE SINGLE USE ETRAP – POLYP TRAP IS USED FOR SUCTION RETRIEVAL OF ENDOSCOPICALLY REMOVED POLYPS.: Brand: ETRAP

## (undated) DEVICE — TUBING, SUCTION, 1/4" X 20', STRAIGHT: Brand: MEDLINE INDUSTRIES, INC.

## (undated) DEVICE — ADAPT CLN BIOGUARD AIR/H2O DISP

## (undated) DEVICE — DRSNG SURESITE123 8X12IN

## (undated) DEVICE — SINGLE-USE BIOPSY FORCEPS: Brand: RADIAL JAW 4

## (undated) DEVICE — 1010 S-DRAPE TOWEL DRAPE 10/BX: Brand: STERI-DRAPE™

## (undated) DEVICE — DV FILL TUBE: Brand: MENTOR DIAPHRAGM VALVE FILL TUBE

## (undated) DEVICE — THE TORRENT IRRIGATION SCOPE CONNECTOR IS USED WITH THE TORRENT IRRIGATION TUBING TO PROVIDE IRRIGATION FLUIDS SUCH AS STERILE WATER DURING GASTROINTESTINAL ENDOSCOPIC PROCEDURES WHEN USED IN CONJUNCTION WITH AN IRRIGATION PUMP (OR ELECTROSURGICAL UNIT).: Brand: TORRENT

## (undated) DEVICE — PENCL E/S HNDSWTCH SMOKEEVAC HOLSTR 10FT

## (undated) DEVICE — KT ORCA ORCAPOD DISP STRL

## (undated) DEVICE — PAD,ABDOMINAL,8"X10",ST,LF: Brand: MEDLINE

## (undated) DEVICE — GLV SURG PREMIERPRO ORTHO LTX PF SZ8.5 BRN

## (undated) DEVICE — SOL NACL 0.9PCT 1000ML

## (undated) DEVICE — SPNG GZ STRL 2S 4X4 12PLY

## (undated) DEVICE — NDL HYPO PRECISIONGLIDE REG 22G 1 1/2

## (undated) DEVICE — SYS ENSING FLUID M/ ADD MAC1001

## (undated) DEVICE — MSK PROC CURAPLEX O2 2/ADAPT 7FT

## (undated) DEVICE — SENSR O2 OXIMAX FNGR A/ 18IN NONSTR

## (undated) DEVICE — RESERVOIR,SUCTION,100CC,SILICONE: Brand: MEDLINE

## (undated) DEVICE — ASEPTIC FLUID TRANSFER SET: Brand: ASEPTIC FLUID TRANSFER SET

## (undated) DEVICE — SOL NACL 0.9PCT 100ML SGL

## (undated) DEVICE — Device: Brand: FABCO

## (undated) DEVICE — PENCL SMOKE/EVAC MEGADYNE TELESCP 10FT